# Patient Record
Sex: FEMALE | Race: WHITE | Employment: OTHER | URBAN - METROPOLITAN AREA
[De-identification: names, ages, dates, MRNs, and addresses within clinical notes are randomized per-mention and may not be internally consistent; named-entity substitution may affect disease eponyms.]

---

## 2017-01-11 DIAGNOSIS — M35.3 POLYMYALGIA RHEUMATICA (H): Primary | ICD-10-CM

## 2017-01-12 NOTE — TELEPHONE ENCOUNTER
Norco 5-325mg      Last Written Prescription Date: 12/21/2016  Last Fill Quantity: 60,  # refills: 0   Last Office Visit with [facility], P or [facility] prescribing provider: 10/31/2016    Michelle Espitia, Pharmacy Technician  [facility]  [phone]

## 2017-01-13 RX ORDER — HYDROCODONE BITARTRATE AND ACETAMINOPHEN 5; 325 MG/1; MG/1
TABLET ORAL
Qty: 60 TABLET | Refills: 0 | Status: SHIPPED | OUTPATIENT
Start: 2017-01-13 | End: 2017-02-08

## 2017-01-18 DIAGNOSIS — I10 ESSENTIAL HYPERTENSION WITH GOAL BLOOD PRESSURE LESS THAN 140/90: ICD-10-CM

## 2017-01-18 DIAGNOSIS — N18.30 CKD (CHRONIC KIDNEY DISEASE) STAGE 3, GFR 30-59 ML/MIN (H): ICD-10-CM

## 2017-01-18 LAB
ALT SERPL W P-5'-P-CCNC: 21 U/L (ref 0–50)
AST SERPL W P-5'-P-CCNC: 19 U/L (ref 0–45)

## 2017-01-18 PROCEDURE — 84460 ALANINE AMINO (ALT) (SGPT): CPT | Performed by: FAMILY MEDICINE

## 2017-01-18 PROCEDURE — 36415 COLL VENOUS BLD VENIPUNCTURE: CPT | Performed by: FAMILY MEDICINE

## 2017-01-18 PROCEDURE — 84450 TRANSFERASE (AST) (SGOT): CPT | Performed by: FAMILY MEDICINE

## 2017-02-08 DIAGNOSIS — M35.3 POLYMYALGIA RHEUMATICA (H): Primary | ICD-10-CM

## 2017-02-08 NOTE — TELEPHONE ENCOUNTER
Hydrocodone/APAP 5/325mg      Last Written Prescription Date: 1/13/2017  Last Fill Quantity: 60,  # refills: 0   Last Office Visit with [facility], P or [facility] prescribing provider: 10/31/2016                                         Next 5 appointments (look out 90 days)     Mar 01, 2017  2:15 PM   Office Visit with Tai Kim MD   [facility] ([facility])    [address] [place] [id number] [phone]                      Please bring signed prescription to [facility] if approved.    Thank you,  Jessica Lowery, [facility]

## 2017-02-09 RX ORDER — HYDROCODONE BITARTRATE AND ACETAMINOPHEN 5; 325 MG/1; MG/1
TABLET ORAL
Qty: 60 TABLET | Refills: 0 | Status: SHIPPED | OUTPATIENT
Start: 2017-02-09 | End: 2017-03-08

## 2017-02-11 ENCOUNTER — APPOINTMENT (OUTPATIENT)
Dept: GENERAL RADIOLOGY | Facility: CLINIC | Age: 82
DRG: 391 | End: 2017-02-11
Attending: FAMILY MEDICINE
Payer: MEDICARE

## 2017-02-11 ENCOUNTER — APPOINTMENT (OUTPATIENT)
Dept: CT IMAGING | Facility: CLINIC | Age: 82
DRG: 391 | End: 2017-02-11
Attending: FAMILY MEDICINE
Payer: MEDICARE

## 2017-02-11 ENCOUNTER — HOSPITAL ENCOUNTER (INPATIENT)
Facility: CLINIC | Age: 82
LOS: 2 days | Discharge: HOME | DRG: 391 | End: 2017-02-13
Attending: FAMILY MEDICINE | Admitting: INTERNAL MEDICINE
Payer: MEDICARE

## 2017-02-11 DIAGNOSIS — I95.9 HYPOTENSION DETERMINED BY EXAMINATION: ICD-10-CM

## 2017-02-11 DIAGNOSIS — A41.9 SEVERE SEPSIS WITH SEPTIC SHOCK (H): ICD-10-CM

## 2017-02-11 DIAGNOSIS — E83.39 HYPOPHOSPHATEMIA: Primary | ICD-10-CM

## 2017-02-11 DIAGNOSIS — R68.83 CHILLS: ICD-10-CM

## 2017-02-11 DIAGNOSIS — F03.90 DEMENTIA WITHOUT BEHAVIORAL DISTURBANCE, UNSPECIFIED DEMENTIA TYPE: ICD-10-CM

## 2017-02-11 DIAGNOSIS — R65.21 SEVERE SEPSIS WITH SEPTIC SHOCK (H): ICD-10-CM

## 2017-02-11 DIAGNOSIS — I10 HYPERTENSION GOAL BP (BLOOD PRESSURE) < 140/90: ICD-10-CM

## 2017-02-11 DIAGNOSIS — Z91.81 RISK FOR FALLS: ICD-10-CM

## 2017-02-11 DIAGNOSIS — E86.0 DEHYDRATION: ICD-10-CM

## 2017-02-11 DIAGNOSIS — N18.30 CKD (CHRONIC KIDNEY DISEASE) STAGE 3, GFR 30-59 ML/MIN (H): ICD-10-CM

## 2017-02-11 DIAGNOSIS — Z71.89 ADVANCED DIRECTIVES, COUNSELING/DISCUSSION: Chronic | ICD-10-CM

## 2017-02-11 DIAGNOSIS — R19.7 DIARRHEA OF PRESUMED INFECTIOUS ORIGIN: ICD-10-CM

## 2017-02-11 DIAGNOSIS — D72.823 LEUKEMOID REACTION: ICD-10-CM

## 2017-02-11 DIAGNOSIS — R82.81 PYURIA: ICD-10-CM

## 2017-02-11 DIAGNOSIS — R19.7 DIARRHEA: ICD-10-CM

## 2017-02-11 DIAGNOSIS — E87.20 LACTIC ACID ACIDOSIS: ICD-10-CM

## 2017-02-11 PROBLEM — G93.40 ACUTE ENCEPHALOPATHY: Status: ACTIVE | Noted: 2017-02-11

## 2017-02-11 PROBLEM — A08.11 ENTERITIS DUE TO NOROVIRUS: Status: ACTIVE | Noted: 2017-02-11

## 2017-02-11 PROBLEM — M62.81 GENERALIZED MUSCLE WEAKNESS: Status: ACTIVE | Noted: 2017-02-11

## 2017-02-11 LAB
ALBUMIN SERPL-MCNC: 3.5 G/DL (ref 3.4–5)
ALBUMIN UR-MCNC: 100 MG/DL
ALP SERPL-CCNC: 56 U/L (ref 40–150)
ALT SERPL W P-5'-P-CCNC: 20 U/L (ref 0–50)
ANION GAP SERPL CALCULATED.3IONS-SCNC: 8 MMOL/L (ref 3–14)
APPEARANCE UR: CLEAR
AST SERPL W P-5'-P-CCNC: 20 U/L (ref 0–45)
BASE DEFICIT BLDV-SCNC: 1.9 MMOL/L
BASOPHILS # BLD AUTO: 0 10E9/L (ref 0–0.2)
BASOPHILS NFR BLD AUTO: 0.1 %
BILIRUB SERPL-MCNC: 0.2 MG/DL (ref 0.2–1.3)
BILIRUB UR QL STRIP: NEGATIVE
BUN SERPL-MCNC: 22 MG/DL (ref 7–30)
C DIFF TOX B STL QL: NORMAL
CALCIUM SERPL-MCNC: 8.7 MG/DL (ref 8.5–10.1)
CAMPYLOBACTER GROUP BY NAT: NOT DETECTED
CHLORIDE SERPL-SCNC: 108 MMOL/L (ref 94–109)
CO2 SERPL-SCNC: 27 MMOL/L (ref 20–32)
COLOR UR AUTO: YELLOW
CREAT SERPL-MCNC: 0.96 MG/DL (ref 0.52–1.04)
DIFFERENTIAL METHOD BLD: ABNORMAL
ENTERIC PATHOGEN COMMENT: ABNORMAL
EOSINOPHIL # BLD AUTO: 0.5 10E9/L (ref 0–0.7)
EOSINOPHIL NFR BLD AUTO: 2.4 %
ERYTHROCYTE [DISTWIDTH] IN BLOOD BY AUTOMATED COUNT: 13 % (ref 10–15)
GFR SERPL CREATININE-BSD FRML MDRD: 55 ML/MIN/1.7M2
GLUCOSE SERPL-MCNC: 125 MG/DL (ref 70–99)
GLUCOSE UR STRIP-MCNC: NEGATIVE MG/DL
GRAN CASTS #/AREA URNS LPF: ABNORMAL /LPF
HCO3 BLDV-SCNC: 25 MMOL/L (ref 21–28)
HCT VFR BLD AUTO: 39 % (ref 35–47)
HGB BLD-MCNC: 12.4 G/DL (ref 11.7–15.7)
HGB UR QL STRIP: ABNORMAL
IMM GRANULOCYTES # BLD: 0.1 10E9/L (ref 0–0.4)
IMM GRANULOCYTES NFR BLD: 0.3 %
KETONES UR STRIP-MCNC: NEGATIVE MG/DL
LACTATE BLD-SCNC: 2 MMOL/L (ref 0.7–2.1)
LACTATE BLD-SCNC: 2.3 MMOL/L (ref 0.7–2.1)
LACTATE BLD-SCNC: 2.3 MMOL/L (ref 0.7–2.1)
LACTATE BLD-SCNC: 2.6 MMOL/L (ref 0.7–2.1)
LEUKOCYTE ESTERASE UR QL STRIP: NEGATIVE
LYMPHOCYTES # BLD AUTO: 3 10E9/L (ref 0.8–5.3)
LYMPHOCYTES NFR BLD AUTO: 15.5 %
MAGNESIUM SERPL-MCNC: 1.9 MG/DL (ref 1.6–2.3)
MCH RBC QN AUTO: 29.5 PG (ref 26.5–33)
MCHC RBC AUTO-ENTMCNC: 31.8 G/DL (ref 31.5–36.5)
MCV RBC AUTO: 93 FL (ref 78–100)
MONOCYTES # BLD AUTO: 1.3 10E9/L (ref 0–1.3)
MONOCYTES NFR BLD AUTO: 6.7 %
MUCOUS THREADS #/AREA URNS LPF: PRESENT /LPF
NEUTROPHILS # BLD AUTO: 14.4 10E9/L (ref 1.6–8.3)
NEUTROPHILS NFR BLD AUTO: 75 %
NITRATE UR QL: NEGATIVE
NOROVIRUS I AND II BY NAT: ABNORMAL
O2/TOTAL GAS SETTING VFR VENT: ABNORMAL %
PCO2 BLDV: 53 MM HG (ref 40–50)
PH BLDV: 7.28 PH (ref 7.32–7.43)
PH UR STRIP: 7 PH (ref 5–7)
PHOSPHATE SERPL-MCNC: 2.8 MG/DL (ref 2.5–4.5)
PLATELET # BLD AUTO: 284 10E9/L (ref 150–450)
PO2 BLDV: 19 MM HG (ref 25–47)
POTASSIUM SERPL-SCNC: 4 MMOL/L (ref 3.4–5.3)
PROT SERPL-MCNC: 7 G/DL (ref 6.8–8.8)
RBC # BLD AUTO: 4.21 10E12/L (ref 3.8–5.2)
RBC #/AREA URNS AUTO: ABNORMAL /HPF (ref 0–2)
RENAL EPI CELLS #/AREA URNS HPF: ABNORMAL /HPF
ROTAVIRUS A BY NAT: NOT DETECTED
SALMONELLA SPECIES BY NAT: NOT DETECTED
SHIGA TOXIN 1 GENE BY NAT: NOT DETECTED
SHIGA TOXIN 2 GENE BY NAT: NOT DETECTED
SHIGELLA SP+EIEC IPAH STL QL NAA+PROBE: NOT DETECTED
SODIUM SERPL-SCNC: 143 MMOL/L (ref 133–144)
SP GR UR STRIP: 1.02 (ref 1–1.03)
SPECIMEN SOURCE: NORMAL
URN SPEC COLLECT METH UR: ABNORMAL
UROBILINOGEN UR STRIP-ACNC: 0.2 EU/DL (ref 0.2–1)
VIBRIO GROUP BY NAT: NOT DETECTED
WBC # BLD AUTO: 19.3 10E9/L (ref 4–11)
WBC #/AREA URNS AUTO: ABNORMAL /HPF (ref 0–2)
YERSINIA ENTEROCOLITICA BY NAT: NOT DETECTED

## 2017-02-11 PROCEDURE — A9270 NON-COVERED ITEM OR SERVICE: HCPCS | Mod: GY | Performed by: INTERNAL MEDICINE

## 2017-02-11 PROCEDURE — 85025 COMPLETE CBC W/AUTO DIFF WBC: CPT | Performed by: FAMILY MEDICINE

## 2017-02-11 PROCEDURE — 99285 EMERGENCY DEPT VISIT HI MDM: CPT | Mod: 25

## 2017-02-11 PROCEDURE — 81001 URINALYSIS AUTO W/SCOPE: CPT | Performed by: FAMILY MEDICINE

## 2017-02-11 PROCEDURE — 36415 COLL VENOUS BLD VENIPUNCTURE: CPT | Performed by: INTERNAL MEDICINE

## 2017-02-11 PROCEDURE — 82803 BLOOD GASES ANY COMBINATION: CPT | Performed by: FAMILY MEDICINE

## 2017-02-11 PROCEDURE — 93005 ELECTROCARDIOGRAM TRACING: CPT

## 2017-02-11 PROCEDURE — 87493 C DIFF AMPLIFIED PROBE: CPT | Performed by: FAMILY MEDICINE

## 2017-02-11 PROCEDURE — 83605 ASSAY OF LACTIC ACID: CPT | Performed by: FAMILY MEDICINE

## 2017-02-11 PROCEDURE — 99285 EMERGENCY DEPT VISIT HI MDM: CPT | Mod: 25 | Performed by: FAMILY MEDICINE

## 2017-02-11 PROCEDURE — 96361 HYDRATE IV INFUSION ADD-ON: CPT

## 2017-02-11 PROCEDURE — 25000125 ZZHC RX 250: Performed by: FAMILY MEDICINE

## 2017-02-11 PROCEDURE — 99223 1ST HOSP IP/OBS HIGH 75: CPT | Mod: AI | Performed by: INTERNAL MEDICINE

## 2017-02-11 PROCEDURE — 83605 ASSAY OF LACTIC ACID: CPT | Performed by: INTERNAL MEDICINE

## 2017-02-11 PROCEDURE — 87506 IADNA-DNA/RNA PROBE TQ 6-11: CPT | Performed by: FAMILY MEDICINE

## 2017-02-11 PROCEDURE — 25800025 ZZH RX 258: Performed by: PEDIATRICS

## 2017-02-11 PROCEDURE — 51702 INSERT TEMP BLADDER CATH: CPT

## 2017-02-11 PROCEDURE — 74176 CT ABD & PELVIS W/O CONTRAST: CPT

## 2017-02-11 PROCEDURE — 25000128 H RX IP 250 OP 636: Performed by: INTERNAL MEDICINE

## 2017-02-11 PROCEDURE — 25000128 H RX IP 250 OP 636: Performed by: FAMILY MEDICINE

## 2017-02-11 PROCEDURE — 36415 COLL VENOUS BLD VENIPUNCTURE: CPT | Performed by: PEDIATRICS

## 2017-02-11 PROCEDURE — 25000132 ZZH RX MED GY IP 250 OP 250 PS 637: Mod: GY | Performed by: INTERNAL MEDICINE

## 2017-02-11 PROCEDURE — 84100 ASSAY OF PHOSPHORUS: CPT | Performed by: INTERNAL MEDICINE

## 2017-02-11 PROCEDURE — A9270 NON-COVERED ITEM OR SERVICE: HCPCS | Mod: GY | Performed by: PEDIATRICS

## 2017-02-11 PROCEDURE — 93010 ELECTROCARDIOGRAM REPORT: CPT | Performed by: FAMILY MEDICINE

## 2017-02-11 PROCEDURE — 96365 THER/PROPH/DIAG IV INF INIT: CPT

## 2017-02-11 PROCEDURE — 96367 TX/PROPH/DG ADDL SEQ IV INF: CPT

## 2017-02-11 PROCEDURE — 80053 COMPREHEN METABOLIC PANEL: CPT | Performed by: FAMILY MEDICINE

## 2017-02-11 PROCEDURE — 87081 CULTURE SCREEN ONLY: CPT | Performed by: PEDIATRICS

## 2017-02-11 PROCEDURE — 25000132 ZZH RX MED GY IP 250 OP 250 PS 637: Mod: GY | Performed by: PEDIATRICS

## 2017-02-11 PROCEDURE — 87040 BLOOD CULTURE FOR BACTERIA: CPT | Performed by: FAMILY MEDICINE

## 2017-02-11 PROCEDURE — 12000007 ZZH R&B INTERMEDIATE

## 2017-02-11 PROCEDURE — 83605 ASSAY OF LACTIC ACID: CPT | Performed by: PEDIATRICS

## 2017-02-11 PROCEDURE — 71010 XR CHEST PORT 1 VW: CPT | Mod: TC

## 2017-02-11 PROCEDURE — 87086 URINE CULTURE/COLONY COUNT: CPT | Performed by: FAMILY MEDICINE

## 2017-02-11 PROCEDURE — 83735 ASSAY OF MAGNESIUM: CPT | Performed by: INTERNAL MEDICINE

## 2017-02-11 RX ORDER — ONDANSETRON 4 MG/1
4 TABLET, ORALLY DISINTEGRATING ORAL EVERY 6 HOURS PRN
Status: DISCONTINUED | OUTPATIENT
Start: 2017-02-11 | End: 2017-02-13 | Stop reason: HOSPADM

## 2017-02-11 RX ORDER — ACETAMINOPHEN 325 MG/1
650 TABLET ORAL EVERY 4 HOURS PRN
Status: DISCONTINUED | OUTPATIENT
Start: 2017-02-11 | End: 2017-02-11

## 2017-02-11 RX ORDER — QUETIAPINE FUMARATE 25 MG/1
25 TABLET, FILM COATED ORAL AT BEDTIME
Status: DISCONTINUED | OUTPATIENT
Start: 2017-02-11 | End: 2017-02-13 | Stop reason: HOSPADM

## 2017-02-11 RX ORDER — ACETAMINOPHEN 325 MG/1
975 TABLET ORAL EVERY 6 HOURS PRN
Status: DISCONTINUED | OUTPATIENT
Start: 2017-02-11 | End: 2017-02-13 | Stop reason: HOSPADM

## 2017-02-11 RX ORDER — LEVOCETIRIZINE DIHYDROCHLORIDE 5 MG/1
5 TABLET, FILM COATED ORAL EVERY EVENING
Status: DISCONTINUED | OUTPATIENT
Start: 2017-02-11 | End: 2017-02-11 | Stop reason: CLARIF

## 2017-02-11 RX ORDER — ONDANSETRON 2 MG/ML
4 INJECTION INTRAMUSCULAR; INTRAVENOUS EVERY 6 HOURS PRN
Status: DISCONTINUED | OUTPATIENT
Start: 2017-02-11 | End: 2017-02-13 | Stop reason: HOSPADM

## 2017-02-11 RX ORDER — TRAMADOL HYDROCHLORIDE 50 MG/1
50 TABLET ORAL EVERY 6 HOURS PRN
Status: DISCONTINUED | OUTPATIENT
Start: 2017-02-11 | End: 2017-02-13 | Stop reason: HOSPADM

## 2017-02-11 RX ORDER — METRONIDAZOLE 250 MG/1
250 TABLET ORAL EVERY 8 HOURS SCHEDULED
Status: DISCONTINUED | OUTPATIENT
Start: 2017-02-11 | End: 2017-02-13

## 2017-02-11 RX ORDER — LORATADINE 10 MG/1
10 TABLET ORAL EVERY EVENING
Status: DISCONTINUED | OUTPATIENT
Start: 2017-02-11 | End: 2017-02-13 | Stop reason: HOSPADM

## 2017-02-11 RX ORDER — ONDANSETRON 4 MG/1
4 TABLET, ORALLY DISINTEGRATING ORAL
Status: COMPLETED | OUTPATIENT
Start: 2017-02-11 | End: 2017-02-11

## 2017-02-11 RX ORDER — MAGNESIUM SULFATE HEPTAHYDRATE 40 MG/ML
4 INJECTION, SOLUTION INTRAVENOUS EVERY 4 HOURS PRN
Status: DISCONTINUED | OUTPATIENT
Start: 2017-02-11 | End: 2017-02-13 | Stop reason: HOSPADM

## 2017-02-11 RX ORDER — PROCHLORPERAZINE 25 MG
12.5 SUPPOSITORY, RECTAL RECTAL EVERY 12 HOURS PRN
Status: DISCONTINUED | OUTPATIENT
Start: 2017-02-11 | End: 2017-02-13 | Stop reason: HOSPADM

## 2017-02-11 RX ORDER — LIDOCAINE 40 MG/G
CREAM TOPICAL
Status: DISCONTINUED | OUTPATIENT
Start: 2017-02-11 | End: 2017-02-11

## 2017-02-11 RX ORDER — PROCHLORPERAZINE MALEATE 5 MG
5 TABLET ORAL EVERY 6 HOURS PRN
Status: DISCONTINUED | OUTPATIENT
Start: 2017-02-11 | End: 2017-02-13 | Stop reason: HOSPADM

## 2017-02-11 RX ORDER — SODIUM CHLORIDE 9 MG/ML
INJECTION, SOLUTION INTRAVENOUS CONTINUOUS
Status: DISCONTINUED | OUTPATIENT
Start: 2017-02-11 | End: 2017-02-11

## 2017-02-11 RX ORDER — DONEPEZIL HYDROCHLORIDE 5 MG/1
10 TABLET, FILM COATED ORAL AT BEDTIME
Status: DISCONTINUED | OUTPATIENT
Start: 2017-02-11 | End: 2017-02-13 | Stop reason: HOSPADM

## 2017-02-11 RX ORDER — SODIUM CHLORIDE 9 MG/ML
INJECTION, SOLUTION INTRAVENOUS CONTINUOUS
Status: DISCONTINUED | OUTPATIENT
Start: 2017-02-11 | End: 2017-02-12

## 2017-02-11 RX ORDER — ASPIRIN 81 MG/1
81 TABLET ORAL DAILY
Status: DISCONTINUED | OUTPATIENT
Start: 2017-02-12 | End: 2017-02-13 | Stop reason: HOSPADM

## 2017-02-11 RX ORDER — NALOXONE HYDROCHLORIDE 0.4 MG/ML
.1-.4 INJECTION, SOLUTION INTRAMUSCULAR; INTRAVENOUS; SUBCUTANEOUS
Status: DISCONTINUED | OUTPATIENT
Start: 2017-02-11 | End: 2017-02-12

## 2017-02-11 RX ORDER — HYDROCODONE BITARTRATE AND ACETAMINOPHEN 5; 325 MG/1; MG/1
1 TABLET ORAL EVERY 6 HOURS PRN
Status: DISCONTINUED | OUTPATIENT
Start: 2017-02-11 | End: 2017-02-11

## 2017-02-11 RX ADMIN — VANCOMYCIN HYDROCHLORIDE 1500 MG: 10 INJECTION, POWDER, LYOPHILIZED, FOR SOLUTION INTRAVENOUS at 04:45

## 2017-02-11 RX ADMIN — DICLOFENAC SODIUM 50 MG: 50 TABLET, DELAYED RELEASE ORAL at 20:44

## 2017-02-11 RX ADMIN — TAZOBACTAM SODIUM AND PIPERACILLIN SODIUM 4.5 G: 500; 4 INJECTION, SOLUTION INTRAVENOUS at 04:13

## 2017-02-11 RX ADMIN — LORATADINE 10 MG: 10 TABLET ORAL at 20:44

## 2017-02-11 RX ADMIN — METRONIDAZOLE 250 MG: 250 TABLET ORAL at 21:07

## 2017-02-11 RX ADMIN — ONDANSETRON 4 MG: 4 TABLET, ORALLY DISINTEGRATING ORAL at 03:21

## 2017-02-11 RX ADMIN — RANITIDINE HYDROCHLORIDE 300 MG: 150 TABLET, FILM COATED ORAL at 10:27

## 2017-02-11 RX ADMIN — TAZOBACTAM SODIUM AND PIPERACILLIN SODIUM 3.38 G: 375; 3 INJECTION, SOLUTION INTRAVENOUS at 21:07

## 2017-02-11 RX ADMIN — QUETIAPINE FUMARATE 25 MG: 25 TABLET, FILM COATED ORAL at 20:44

## 2017-02-11 RX ADMIN — METRONIDAZOLE 250 MG: 250 TABLET ORAL at 15:40

## 2017-02-11 RX ADMIN — SODIUM CHLORIDE, POTASSIUM CHLORIDE, SODIUM LACTATE AND CALCIUM CHLORIDE 1000 ML: 600; 310; 30; 20 INJECTION, SOLUTION INTRAVENOUS at 09:28

## 2017-02-11 RX ADMIN — SODIUM CHLORIDE: 9 INJECTION, SOLUTION INTRAVENOUS at 05:32

## 2017-02-11 RX ADMIN — SODIUM CHLORIDE: 9 INJECTION, SOLUTION INTRAVENOUS at 18:31

## 2017-02-11 RX ADMIN — SODIUM CHLORIDE 1000 ML: 9 INJECTION, SOLUTION INTRAVENOUS at 02:38

## 2017-02-11 RX ADMIN — SODIUM CHLORIDE: 9 INJECTION, SOLUTION INTRAVENOUS at 10:59

## 2017-02-11 RX ADMIN — DONEPEZIL HYDROCHLORIDE 10 MG: 5 TABLET, FILM COATED ORAL at 20:44

## 2017-02-11 RX ADMIN — LEVOTHYROXINE SODIUM 125 MCG: 25 TABLET ORAL at 10:26

## 2017-02-11 RX ADMIN — HYDROCODONE BITARTRATE AND ACETAMINOPHEN 1 TABLET: 5; 325 TABLET ORAL at 10:59

## 2017-02-11 RX ADMIN — TAZOBACTAM SODIUM AND PIPERACILLIN SODIUM 3.38 G: 375; 3 INJECTION, SOLUTION INTRAVENOUS at 15:39

## 2017-02-11 RX ADMIN — SODIUM CHLORIDE: 9 INJECTION, SOLUTION INTRAVENOUS at 03:48

## 2017-02-11 RX ADMIN — TRAMADOL HYDROCHLORIDE 50 MG: 50 TABLET, COATED ORAL at 23:29

## 2017-02-11 ASSESSMENT — ENCOUNTER SYMPTOMS
CHILLS: 1
WEAKNESS: 1
CHEST TIGHTNESS: 0
CONFUSION: 1
ABDOMINAL PAIN: 1
NAUSEA: 1
DIARRHEA: 1
FREQUENCY: 1
MUSCULOSKELETAL NEGATIVE: 1
SHORTNESS OF BREATH: 0

## 2017-02-11 NOTE — ED NOTES
Pt lives at an assisted living center, EMS reports they were told pt has been having increased urination and diarrhea for the past couple hours with c/o chills and increased weakness and decreased LOC.

## 2017-02-11 NOTE — PROGRESS NOTES
[facility]    Sepsis Evaluation Progress Note    Date of Service: 02/11/2017    I was called to see Anaid Dockery due to persistent elevated lactic acid level. She is not known to have an infection.     Physical Exam    Vital Signs:  Temp: 97  F (36.1  C) Temp src: Oral BP: (!) 135/97 mmHg   Heart Rate: 76 Resp: 20 SpO2: 96 % O2 Device: None (Room air)      Lab:  LACTIC ACID   Date Value Ref Range Status   02/11/2017 2.6* 0.7 - 2.1 mmol/L Final     Comment:     Significant value called to and read back by  TEE MANN RN AT 0900 TA         The patient is at baseline mental status.    The rest of their physical exam is significant for pallor with capillary refill 2-3 seconds but normal blood pressure and peripheral pulses.    Assessment and Plan    The SIRS and exam findings are likely due to severe hypovolemia from diarrhea and inadequate oral intake although sepsis cannot be compelted excluded at this time, there is no sign of sepsis at this time.     Give 1L LR bolus now, continue IVF at 150 ml/hr, and recheck lactic acid in 3 hours.  Continue present empiric antibiotics pending culture results.    Disposition: The patient will remain on the current unit. We will continue to monitor this patient closely.    Vinicius Naqvi MD

## 2017-02-11 NOTE — PROGRESS NOTES
S-(situation): Patient arrives to room 249 via cart from ED    B-(background): Sepsis/Diarrhea    A-(assessment): Pt has some confusion.  Having multiple episodes of diarrhea while in ED.  Change in her mental status.  B/P slightly elevated upon admit. Afebrile.  Lethargy noted.  LS-diminished but clear.  No skin issues, will start to put ointment on after each stool to prevent skin breakdown.      R-(recommendations): Orders reviewed with pt and family. Will monitor patient per MD orders.     Inpatient nursing criteria listed below were met:    Health care directives status obtained and documented: Yes  Core Measures assessed (SSI): Yes  SCD's Documented: Yes  Vaccine assessment done and vaccines ordered if appropriate: Yes  Skin issues/needs documented:NA  Isolation needs addressed, if appropriate: Yes  Fall Prevention: Care plan updated, Education given and documented Yes  MRSA swab completed for patient 55 years and older (exclude JUAN CARLOS and TKA): Yes  My Chart patient sign up addressed and documented: No  Care Plan initiated: Yes  Education Assessment documented:Yes  Education Documented (Pre-existing chronic infection such as, MRSA/VRE need education on admission): Yes  New medication patient education completed and documented (Possible Side Effects of Common Medications handout): No  Home medications if not able to send immediately home with family stored here: NA   Reminder note placed in discharge instructions: Yes  Discharge planning review completed (admission navigator) Yes

## 2017-02-11 NOTE — ED NOTES
Pt has been having loose stools, continue to wash jose area. Noted areas on the back of both legs that measure 0.5cm scratches.

## 2017-02-11 NOTE — IP AVS SNAPSHOT
MRN:[id number]                      After Visit Summary   2/11/2017    Anaid Dockery    MRN: [id number]           Thank you!     Thank you for choosing [facility] for your care. Our goal is always to provide you with excellent care. Hearing back from our patients is one way we can continue to improve our services. Please take a few minutes to complete the written survey that you may receive in the mail after you visit with us. Thank you!        Patient Information     Date Of Birth          2/1/1930        About your hospital stay     You were admitted on:  February 11, 2017 You last received care in the:  [address] [facility]    You were discharged on:  February 13, 2017       Who to Call     For medical emergencies, please call 911.  For non-urgent questions about your medical care, please call your primary care provider or clinic, [phone]          Attending Provider     Provider Specialty    Jb Locke DO [facility]    Vinicius Naqvi MD Internal Medicine    Escobar Reece MD [facility]       Primary Care Provider Office Phone # Fax #    Tai Kim [phone]       [address] [address]        After Care Instructions     Activity       Your activity upon discharge: activity as tolerated            Diet       Follow this diet upon discharge: Advance to a regular diet as tolerated                  Follow-up Appointments     Follow-up and recommended labs and tests        Follow up with primary care provider, Tai Kim, within 7 days for hospital follow- up.  The following labs/tests are recommended: bmp.                  Your next 10 appointments already scheduled     Mar 01, 2017  2:15 PM CST   [facility]    [address] [place] [id number] [phone]            Bring a current list of meds and any records pertaining to this visit.  For Physicals, please bring immunization records and any forms needing to be filled out.  Please arrive 10 minutes early to complete paperwork.              Pending Results     Date and Time Order Name Status Description    2/11/2017 0218 Blood culture Preliminary     2/11/2017 0218 Blood culture Preliminary             Statement of Approval     Ordered          02/13/17 1525  I have reviewed and agree with all the recommendations and orders detailed in this document.  EFFECTIVE NOW     Approved and electronically signed by:  Escobar Reece MD             Admission Information     Date & Time Provider Department Dept. Phone    2/11/2017 Escobar Reece MD [address] [facility] [phone]      Your Vitals Were     Blood Pressure Pulse Temperature Respirations Weight Pulse Oximetry    168/82 64 98.5  F (36.9  C) (Oral) 18 83.5 kg (184 lb 1.4 oz) 93%    BMI (Body Mass Index)                   30.63 kg/m2           [employer] [facility]     [employer] gives you secure access to your electronic health record. If you see a primary care provider, you can also send messages to your care team and make appointments. If you have questions, please call your primary care clinic.  If you do not have a primary care provider, please call [phone] and they will assist you.        Care EveryWhere ID     This is your Care EveryWhere ID. This could be used by other organizations to access your [facility] medical records  [id number]           Review of your medicines      START taking        Dose / Directions    phosphorus tablet 250 mg 250 MG per tablet   Commonly known as:  K PHOS NEUTRAL   Used for:  Hypophosphatemia        Dose:  250 mg   Take 1 tablet (250 mg) by mouth 2 times daily for 2 days   Quantity:  4 tablet   Refills:  0         CONTINUE these medicines which have NOT CHANGED        Dose / Directions    ACTAMIN  MAXIMUM STRENGTH OR        Dose:  500 mg   Take 500 mg by mouth every 6 hours. Every 4-6 hours for pain   Refills:  0       aspirin 81 MG tablet   Used for:  Essential hypertension with goal blood pressure less than 140/90        ONE DAILY   Quantity:  100 tablet   Refills:  3       CALCIUM 500 + D 500-125 MG-UNIT Tabs   Used for:  Osteoporosis, unspecified   Generic drug:  Calcium Carbonate-Vitamin D        1 A DAY   Refills:  0       diclofenac 50 MG EC tablet   Commonly known as:  VOLTAREN   Used for:  Essential hypertension with goal blood pressure less than 140/90        TAKE ONE TABLET BY MOUTH TWICE A DAY   Quantity:  180 tablet   Refills:  0       donepezil 10 MG tablet   Commonly known as:  ARICEPT   Used for:  Dementia        TAKE ONE TABLET BY MOUTH EVERY NIGHT AT BEDTIME   Quantity:  90 tablet   Refills:  2       Fish Oil 1200 MG Caps        every day   Refills:  0       FLUoxetine 20 MG capsule   Commonly known as:  PROzac   Used for:  Major depressive disorder, recurrent episode, mild (H)        TAKE ONE CAPSULE BY MOUTH EVERY DAY   Quantity:  90 capsule   Refills:  3       HYDROcodone-acetaminophen 5-325 MG per tablet   Commonly known as:  NORCO   Used for:  Polymyalgia rheumatica (H)        TAKE 1 TABLET BY ORAL ROUTE EVERY 6 HOURS AS NEEDED FOR PAIN   Quantity:  60 tablet   Refills:  0       hydrocortisone-pramoxine rectal foam   Commonly known as:  PROCTOFOAM HC   Used for:  External hemorrhoids        Dose:  1 applicator   Place 1 applicator rectally 2 times daily   Quantity:  10 g   Refills:  2       levocetirizine 5 MG tablet   Commonly known as:  XYZAL   Used for:  Seasonal allergic rhinitis        TAKE ONE TABLET BY MOUTH EVERY EVENING   Quantity:  30 tablet   Refills:  11       levothyroxine 125 MCG tablet   Commonly known as:  SYNTHROID/LEVOTHROID   Used for:  Hypothyroidism, unspecified type        TAKE ONE TABLET BY MOUTH EVERY DAY   Quantity:  90 tablet   Refills:  3       lidocaine 5 %  ointment   Commonly known as:  XYLOCAINE   Used for:  Pain of left upper extremity        Apply topically 2 times daily To affected areas.   Quantity:  50 g   Refills:  1       loperamide 2 MG tablet   Commonly known as:  IMODIUM A-D   Used for:  Diarrhea        Start with 2 tabs (4 mg), then take one tab (2 mg) after each diarrheal stool.  Do not use more than  8 tabs (16 mg) per day.   Quantity:  30 tablet   Refills:  0       metoprolol 25 MG tablet   Commonly known as:  LOPRESSOR   Used for:  Hypertension goal BP (blood pressure) < 140/90        TAKE ONE TABLET BY MOUTH EVERY MORNING AND TAKE TWO TABLETS BY MOUTH EVERY EVENING   Quantity:  270 tablet   Refills:  2       Multi-vitamin Tabs tablet   Generic drug:  multivitamin, therapeutic with minerals        1 tablet daily   Quantity:  120   Refills:  0       order for DME   Used for:  Other osteoarthritis of spine, lumbar region        Equipment being ordered: Wheelchair basic with foot rests.   Quantity:  1 Device   Refills:  0       QUEtiapine 25 MG tablet   Commonly known as:  SEROQUEL        take 1 and 1/2 tab daily if needed for restlessness/agitation and 1 at bedtime routinely   Quantity:  225 tablet   Refills:  1       ranitidine 300 MG tablet   Commonly known as:  ZANTAC   Used for:  Gastroesophageal reflux disease with esophagitis        TAKE ONE TABLET BY MOUTH EVERY DAY   Quantity:  90 tablet   Refills:  3         STOP taking     MIRALAX powder   Generic drug:  polyethylene glycol           sennosides 8.6 MG tablet   Commonly known as:  SENOKOT                Where to get your medicines      These medications were sent to [facility] [place], [place] - [address]  [address], [facility] [address]     Phone:  [phone]     phosphorus tablet 250 mg 250 MG per tablet                Protect others around you: Learn how to safely use, store and throw away your medicines at www.disposemymeds.org.             Medication List: This  is a list of all your medications and when to take them. Check marks below indicate your daily home schedule. Keep this list as a reference.      Medications           Morning Afternoon Evening Bedtime As Needed    ACTAMIN MAXIMUM STRENGTH OR   Take 500 mg by mouth every 6 hours. Every 4-6 hours for pain                                   aspirin 81 MG tablet   ONE DAILY                                   CALCIUM 500 + D 500-125 MG-UNIT Tabs   1 A DAY   Generic drug:  Calcium Carbonate-Vitamin D                                   diclofenac 50 MG EC tablet   Commonly known as:  VOLTAREN   TAKE ONE TABLET BY MOUTH TWICE A DAY   Last time this was given:  50 mg on 2/13/2017  8:14 AM                                      donepezil 10 MG tablet   Commonly known as:  ARICEPT   TAKE ONE TABLET BY MOUTH EVERY NIGHT AT BEDTIME   Last time this was given:  10 mg on 2/12/2017  9:23 PM                                   Fish Oil 1200 MG Caps   every day                                FLUoxetine 20 MG capsule   Commonly known as:  PROzac   TAKE ONE CAPSULE BY MOUTH EVERY DAY   Last time this was given:  20 mg on 2/13/2017  8:12 AM                                   HYDROcodone-acetaminophen 5-325 MG per tablet   Commonly known as:  NORCO   TAKE 1 TABLET BY ORAL ROUTE EVERY 6 HOURS AS NEEDED FOR PAIN   Last time this was given:  1 tablet on 2/11/2017 10:59 AM                                   hydrocortisone-pramoxine rectal foam   Commonly known as:  PROCTOFOAM HC   Place 1 applicator rectally 2 times daily                                      levocetirizine 5 MG tablet   Commonly known as:  XYZAL   TAKE ONE TABLET BY MOUTH EVERY EVENING                                   levothyroxine 125 MCG tablet   Commonly known as:  SYNTHROID/LEVOTHROID   TAKE ONE TABLET BY MOUTH EVERY DAY   Last time this was given:  125 mcg on 2/13/2017  8:12 AM                                   lidocaine 5 % ointment   Commonly known as:  XYLOCAINE   Apply  topically 2 times daily To affected areas.                                loperamide 2 MG tablet   Commonly known as:  IMODIUM A-D   Start with 2 tabs (4 mg), then take one tab (2 mg) after each diarrheal stool.  Do not use more than  8 tabs (16 mg) per day.                                metoprolol 25 MG tablet   Commonly known as:  LOPRESSOR   TAKE ONE TABLET BY MOUTH EVERY MORNING AND TAKE TWO TABLETS BY MOUTH EVERY EVENING   Last time this was given:  50 mg on 2/13/2017  4:14 AM                                      Multi-vitamin Tabs tablet   1 tablet daily   Generic drug:  multivitamin, therapeutic with minerals                                   order for DME   Equipment being ordered: Wheelchair basic with foot rests.                                phosphorus tablet 250 mg 250 MG per tablet   Commonly known as:  K PHOS NEUTRAL   Take 1 tablet (250 mg) by mouth 2 times daily for 2 days                                      QUEtiapine 25 MG tablet   Commonly known as:  SEROQUEL   take 1 and 1/2 tab daily if needed for restlessness/agitation and 1 at bedtime routinely   Last time this was given:  25 mg on 2/12/2017  9:23 PM                                   ranitidine 300 MG tablet   Commonly known as:  ZANTAC   TAKE ONE TABLET BY MOUTH EVERY DAY   Last time this was given:  300 mg on 2/13/2017  8:12 AM
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
## 2017-02-11 NOTE — H&P
[facility]    History and Physical  Hospitalist       Date of Admission:  2/11/2017  Date of Service (when I saw the patient): 02/11/2017    Assessment and Plan      Active Problems:    Sepsis (H)    Assessment: manifested by acute encephalopathy, leucocytosis, hypotension and elevated lactic acid.  She has a few WBC in her urine but her diarrhea is felt to be the most likely source.  Her CXR looks clear.  There has been some improvement in the ED with stabilization of her blood pressure and she is now able to answer a few questions which she was not able to do initially.      Plan: admit to inpatient, send stool for ALYSSA and c diff toxin, start empiric antibiotics with zosyn, vanco and will add oral flagyl, aggressive IV fluids, repeat lactic acid, monitor temps and WBC, BC and UC already sent      Diarrhea    Assessment: etiology unclear but suspected to be infectious.  She is having large volume of watery diarrhea.  She has not tenderness on exam so doubt ischemia.  No recent antibiotics.  No ill contacts with similar symptoms. No recent travel.     Plan: will send stool for ALYSSA as well as c diff toxin      Pyuria    Assessment: asymptomatic    Plan: UC and cover with antibiotics as outlined above for now      Generalized muscle weakness    Assessment: likely due to her acute illness.  No focal weakness and now following commands appropriately but remains somnolent.    Plan: monitor during her hospital stay and PT eval prior to discharge if she remains weak closer to her discharge      Acute encephalopathy    Assessment: chronic dementia but usually awake.  Was initially minimally responsive but now opening her eyes and talking briefly before she closes her eyes again. She is answering questions appropriately    Plan: monitor      CKD (chronic kidney disease) stage 3, GFR 30-59 ml/min    Assessment: chronic and stable thus far    Plan: follow      Dementia without behavioral  disturbance, unspecified dementia type    Assessment: chronic    Plan: no acute intervention but will continue seroquel at bedtime      Hypothyroidism, unspecified type    Assessment: recent free T4 normal    Plan: continue replacement      Essential hypertension with goal blood pressure less than 140/90    Assessment: BP was below 80 when first checked by paramedics but now has improved with fluids    Plan: hold BP meds for now      Chronic pain syndrome    Assessment: appears stable    Plan: continue vicodin prn      DVT Prophylaxis: Pneumatic Compression Devices  Code Status: DNR/DNI    Disposition: Expected discharge in 2-3 days once diarrhea has slowed down, tolerating advancing diet, signs of sepsis have resolved and cultures negative.    Escobar Mckeon MD    Primary Care Physician  Tai Kim    Chief Complaint  Somnolent    History is obtained from the patient, emergency department physician and patient's family    History of Present Illness  Anaid Dockery is a 87 year old female who presents with somnolence.  She has been having diarrhea for the last couple of days.  She has not had abdominal pain. She denies any urinary symptoms.  She had one episode of vomiting on her way to the ED in the ambulance but otherwise has not had any vomiting.  There has been no blood noted in the stool.  She admits to both chills and sweats.  Her diarrhea has slowly worsened over the last couple of days.  Tonight she was not responding to caregivers and was incontinent of stool so EMS was called.      Past Medical History   I have reviewed this patient's medical history and updated it with pertinent information if needed.   Past Medical History   Diagnosis Date     Thyrotoxicosis without mention of goiter or other cause, without mention of thyrotoxic crisis or storm      hyperthyroidism     Myalgia and myositis, unspecified      fibromyalgia; Myalgias/arthralgias.     Osteoporosis, unspecified      Other  kyphoscoliosis and scoliosis      NONSPECIFIC MEDICAL HISTORY      Iliotibial band syndrome.     Enthesopathy of hip region      Bilateral trochanteric bursitis     NONSPECIFIC MEDICAL HISTORY      Chronic pain syndrome     Lump or mass in breast 4/12/2006     Sicca syndrome (H)        Past Surgical History  I have reviewed this patient's surgical history and updated it with pertinent information if needed.  Past Surgical History   Procedure Laterality Date     Hc correct bunion,simple       Hc removal of leg veins/ulcer       vein stripping     C repair of rectocele  8/20/99     Hc anter colporrhaphy,blad/vagina  8/20/99     Cystocele Repair     Hc colonoscopy thru stoma, diagnostic  4/04     Hc remv cataract extracap,insert lens  7/17/2003     right     C nonspecific procedure  04/09/2004     Lumbar epidural steroid injection via the posterior interlaminar approach at the follwoing spina level: L4-5.  The procedure was performed with fluoroscopic guidance.  ([facility])     C total abdom hysterectomy  1970's     Hysterectomy, Total Abdominal     C anesth,repair lo hernia ventr/incis  1970's     C total hip arthroplasty  7/04     Hip Replacement, Total, right     Hc ugi endoscopy diag w or w/o brush/wash  03/08/2006     Hc place needle wire preop, breast, initial  04/07/06     Wire locallization of right  breast     Biopsy salivary gland,incisional  03/21/2007     Inferior lip/     Hc inj epidural lumbar/sacral w/wo contrast  2008     lumbar epidural steroid injection via posterior interlaminar approach at following spinal level-L4-5     Hc drain/inj major joint/bursa w/o us  2009     Left shoulder     Hc destruct facet jt nerve, lumb/sacr single level  2009     Colonoscopy  7/16/2013     Procedure: COLONOSCOPY;  colonoscopy;  Surgeon: Tiago Lowery MD;  Location: [facility]       Prior to Admission Medications  Prior to Admission Medications   Prescriptions Last Dose Informant Patient Reported?  Taking?   Acetaminophen (ACTAMIN MAXIMUM STRENGTH OR)   Yes Yes   Sig: Take 500 mg by mouth every 6 hours. Every 4-6 hours for pain   CALCIUM 500 + D 500-125 MG-UNIT OR TABS   Yes Yes   Si A DAY   FISH OIL 1200 MG OR CAPS   Yes Yes   Sig: every day   FLUoxetine (PROZAC) 20 MG capsule   No No   Sig: TAKE ONE CAPSULE BY MOUTH EVERY DAY   HYDROcodone-acetaminophen (NORCO) 5-325 MG per tablet   No No   Sig: TAKE 1 TABLET BY ORAL ROUTE EVERY 6 HOURS AS NEEDED FOR PAIN   MULTI-VITAMIN TABS   OR   No Yes   Si tablet daily   QUEtiapine (SEROQUEL) 25 MG tablet   No Yes   Sig: take 1 and 1/2 tab daily if needed for restlessness/agitation and 1 at bedtime routinely   aspirin 81 MG tablet   Yes Yes   Sig: ONE DAILY   diclofenac (VOLTAREN) 50 MG EC tablet   No Yes   Sig: TAKE ONE TABLET BY MOUTH TWICE A DAY   diclofenac (VOLTAREN) 50 MG EC tablet   No Yes   Sig: TAKE ONE TABLET BY MOUTH TWICE A DAY   donepezil (ARICEPT) 10 MG tablet   No Yes   Sig: TAKE ONE TABLET BY MOUTH EVERY NIGHT AT BEDTIME   hydrocortisone-pramoxine (PROCTOFOAM HC) rectal foam   No No   Sig: Place 1 applicator rectally 2 times daily   levocetirizine (XYZAL) 5 MG tablet   No Yes   Sig: TAKE ONE TABLET BY MOUTH EVERY EVENING   levothyroxine (SYNTHROID, LEVOTHROID) 125 MCG tablet   No Yes   Sig: TAKE ONE TABLET BY MOUTH EVERY DAY   lidocaine (XYLOCAINE) 5 % ointment   No Yes   Sig: Apply topically 2 times daily To affected areas.   loperamide (IMODIUM A-D) 2 MG tablet   No Yes   Sig: Start with 2 tabs (4 mg), then take one tab (2 mg) after each diarrheal stool.  Do not use more than  8 tabs (16 mg) per day.   metoprolol (LOPRESSOR) 25 MG tablet   No Yes   Sig: TAKE ONE TABLET BY MOUTH EVERY MORNING AND TAKE TWO TABLETS BY MOUTH EVERY EVENING   order for DME   No Yes   Sig: Equipment being ordered: Wheelchair basic with foot rests.   polyethylene glycol (MIRALAX) powder   Yes Yes   Si capful every other day   ranitidine (ZANTAC) 300 MG tablet   No  Yes   Sig: TAKE ONE TABLET BY MOUTH EVERY DAY   sennosides (SENOKOT) 8.6 MG tablet   Yes Yes   Sig: Take 2 tablets by mouth 2 times daily      Facility-Administered Medications: None     Allergies  Allergies   Allergen Reactions     Bactrim [Sulfamethoxazole W/Trimethoprim]      Codeine Itching     endocet/percocet caused     Cortisone Acetate      20 years ago doesn't remember exact side effect. Has tolerated steroid injection into joints. 6/2008 noted     Oxycodone Itching       Social History  I have reviewed this patient's social history and updated it with pertinent information if needed. Anaid Dockery  reports that she has never smoked. She has never used smokeless tobacco. She reports that she does not drink alcohol or use illicit drugs.    Family History  I have reviewed this patient's family history and updated it with pertinent information if needed.   Family History   Problem Relation Age of Onset     CANCER Sister      Unknown       Review of Systems  The 10 point Review of Systems is negative other than noted in the HPI or here.     Physical Exam  Temp: 97.8  F (36.6  C) Temp src: Oral BP: 165/87 mmHg   Heart Rate: 65 Resp: 16 SpO2: 97 % O2 Device: None (Room air)    Vital Signs with Ranges  Temp:  [97.8  F (36.6  C)] 97.8  F (36.6  C)  Heart Rate:  [65-67] 65  Resp:  [16] 16  BP: ()/(57-99) 165/87 mmHg  SpO2:  [95 %-100 %] 97 %  0 lbs 0 oz    Constitutional:   Somnolent but will open her eyes briefly and answer questions appropriately, cooperative, no apparent distress, and appears stated age     Eyes:   Lids and lashes normal, pupils equal, round and reactive to light, extra ocular muscles intact, sclera clear, conjunctiva normal     ENT:   Normocephalic, without obvious abnormality, atraumatic, sinuses nontender on palpation, external ears without lesions, oral pharynx with moist mucous membranes, tonsils without erythema or exudates, gums normal and good dentition.     Neck:   Supple,  symmetrical, trachea midline, no adenopathy, thyroid symmetric, not enlarged and no tenderness, skin normal     Hematologic / Lymphatic:   no cervical lymphadenopathy and no supraclavicular lymphadenopathy     Back:   Symmetric, no curvature, spinous processes are non-tender on palpation, paraspinous muscles are non-tender on palpation, no costal vertebral tenderness     Lungs:   No increased work of breathing, good air exchange, clear to auscultation bilaterally, no crackles or wheezing     Cardiovascular:   Normal apical impulse, regular rate and rhythm, normal S1 and S2, no S3 or S4, and no murmur noted     Abdomen:   normal bowel sounds, soft, non-distended, non-tender, no masses palpated, no hepatosplenomegally     Neurologic:   Somnolent but arouses and answers questions appropriately  Cranial nerves II-XII are grossly intact.  Motor is 5 out of 5 bilaterally.    Sensory is intact.         Data  Data reviewed today:  I personally reviewed the EKG tracing showing NSR without ischemic changes.    Recent Labs  Lab 02/11/17  0233   WBC 19.3*   HGB 12.4   MCV 93         POTASSIUM 4.0   CHLORIDE 108   CO2 27   BUN 22   CR 0.96   ANIONGAP 8   DIOGO 8.7   *   ALBUMIN 3.5   PROTTOTAL 7.0   BILITOTAL 0.2   ALKPHOS 56   ALT 20   AST 20       Recent Results (from the past 24 hour(s))   XR Chest Port 1 View    Narrative    XR CHEST PORT 1 VW  2/11/2017 3:37 AM      HISTORY: Fever.    COMPARISON: 2/26/2016.    FINDINGS: Upright portable chest. The heart size is normal. Thoracic  aorta is calcified and tortuous. The lungs are clear. No pneumothorax.      Impression    IMPRESSION: No acute abnormality.   CT Abdomen Pelvis w/o Contrast    Narrative    CT ABDOMEN PELVIS W/O CONTRAST  2/11/2017 4:39 AM      HISTORY: Abdominal pain. Sepsis. Diarrhea. Acidosis.    TECHNIQUE: CT abdomen and pelvis without oral or intravenous contrast.  Radiation dose for this scan was reduced using automated exposure  control,  adjustment of the mA and/or kV according to patient size, or  iterative reconstruction technique.     COMPARISON: None.    FINDINGS:    Abdomen: There is atelectasis and scarring at the lung bases. Small  right pleural effusion or pleural thickening. There are coronary  artery atherosclerotic calcifications. The heart size is normal.  Evaluation of the solid abdominal organs is limited by the lack of  intravenous contrast. The liver, spleen, gallbladder, adrenal glands,  and kidneys are normal in appearance. There is a 1.4 cm cyst in the  pancreas head. The pancreas otherwise appears grossly normal. There is  no abdominal or pelvic lymph node enlargement. There is  atherosclerotic calcification of the aorta and its branches. No  aneurysm.    Pelvis: A right hip arthroplasty causes streak artifact through the  pelvis. There is fluid throughout nondilated small and large bowel to  the rectum. No bowel obstruction. No evidence of bowel wall thickening  or inflammation. There is a catheter in the urinary bladder. Multiple  pelvic phleboliths. No free intraperitoneal gas or fluid. Degenerative  disease and scoliosis in the spine.      Impression    IMPRESSION:  1. Fluid-filled small and large bowel without evidence of obstruction  or inflammation.  2. Coronary artery atherosclerotic calcifications.  3. 1.4 cm pancreas head cyst. Comparison to any old exam or one year  follow-up is recommended.

## 2017-02-11 NOTE — ED NOTES
Pt incontinent of stool but it is decreasing in volume and not as watery. Pt denies any abd pain or nausea at this time.

## 2017-02-11 NOTE — IP AVS SNAPSHOT
[address] [facility]    [address] [place] [address]    Phone:  [phone]                                       After Visit Summary   2/11/2017    Anaid Dockery    MRN: [id number]           After Visit Summary Signature Page     I have received my discharge instructions, and my questions have been answered. I have discussed any challenges I see with this plan with the nurse or doctor.    ..........................................................................................................................................  Patient/Patient Representative Signature      ..........................................................................................................................................  Patient Representative Print Name and Relationship to Patient    ..................................................               ................................................  Date                                            Time    ..........................................................................................................................................  Reviewed by Signature/Title    ...................................................              ..............................................  Date                                                            Time

## 2017-02-11 NOTE — ED NOTES
Writer called Cony that is in charge at the [facility] and left a message to call hospital. The number is [phone]

## 2017-02-11 NOTE — ED NOTES
Pt lives at a group home that is in her daughters home. Daughter is currently in [place] and called to report that pt has had no medication changes from her last visit. Pt has another daughter here but she is unaware of what times pt takes her medication or her last doses.

## 2017-02-11 NOTE — ED NOTES
Pt alert and talkative, reports she was having abd pain this evening but denies any at this time. C/O nausea. When pt arrived she was incontinent of loose stool. Pt washed up, no redness or open areas noted to buttocks or back.

## 2017-02-12 PROBLEM — D64.9 ANEMIA: Status: ACTIVE | Noted: 2017-02-12

## 2017-02-12 PROBLEM — E87.6 HYPOPOTASSEMIA: Status: ACTIVE | Noted: 2017-02-12

## 2017-02-12 PROBLEM — E87.29 HYPERCHLOREMIC METABOLIC ACIDOSIS: Status: ACTIVE | Noted: 2017-02-12

## 2017-02-12 PROBLEM — G93.40 ACUTE ENCEPHALOPATHY: Status: RESOLVED | Noted: 2017-02-11 | Resolved: 2017-02-12

## 2017-02-12 PROBLEM — R57.1 HYPOVOLEMIC SHOCK (H): Status: ACTIVE | Noted: 2017-02-11

## 2017-02-12 LAB
ANION GAP SERPL CALCULATED.3IONS-SCNC: 9 MMOL/L (ref 3–14)
BACTERIA SPEC CULT: NORMAL
BASE DEFICIT BLDV-SCNC: 5.3 MMOL/L
BUN SERPL-MCNC: 12 MG/DL (ref 7–30)
CALCIUM SERPL-MCNC: 7.1 MG/DL (ref 8.5–10.1)
CHLORIDE SERPL-SCNC: 117 MMOL/L (ref 94–109)
CO2 SERPL-SCNC: 18 MMOL/L (ref 20–32)
CREAT SERPL-MCNC: 0.78 MG/DL (ref 0.52–1.04)
ERYTHROCYTE [DISTWIDTH] IN BLOOD BY AUTOMATED COUNT: 13.2 % (ref 10–15)
GFR SERPL CREATININE-BSD FRML MDRD: 70 ML/MIN/1.7M2
GLUCOSE SERPL-MCNC: 87 MG/DL (ref 70–99)
HCO3 BLDV-SCNC: 20 MMOL/L (ref 21–28)
HCT VFR BLD AUTO: 30.9 % (ref 35–47)
HGB BLD-MCNC: 9.8 G/DL (ref 11.7–15.7)
LACTATE BLD-SCNC: 0.6 MMOL/L (ref 0.7–2.1)
MCH RBC QN AUTO: 29.8 PG (ref 26.5–33)
MCHC RBC AUTO-ENTMCNC: 31.7 G/DL (ref 31.5–36.5)
MCV RBC AUTO: 94 FL (ref 78–100)
MICRO REPORT STATUS: NORMAL
O2/TOTAL GAS SETTING VFR VENT: 21 %
PCO2 BLDV: 36 MM HG (ref 40–50)
PH BLDV: 7.35 PH (ref 7.32–7.43)
PLATELET # BLD AUTO: 199 10E9/L (ref 150–450)
PO2 BLDV: 58 MM HG (ref 25–47)
POTASSIUM SERPL-SCNC: 3.1 MMOL/L (ref 3.4–5.3)
POTASSIUM SERPL-SCNC: 3.6 MMOL/L (ref 3.4–5.3)
RBC # BLD AUTO: 3.29 10E12/L (ref 3.8–5.2)
SODIUM SERPL-SCNC: 144 MMOL/L (ref 133–144)
SPECIMEN SOURCE: NORMAL
WBC # BLD AUTO: 6.6 10E9/L (ref 4–11)

## 2017-02-12 PROCEDURE — 25000128 H RX IP 250 OP 636: Performed by: INTERNAL MEDICINE

## 2017-02-12 PROCEDURE — A9270 NON-COVERED ITEM OR SERVICE: HCPCS | Mod: GY | Performed by: INTERNAL MEDICINE

## 2017-02-12 PROCEDURE — 36415 COLL VENOUS BLD VENIPUNCTURE: CPT | Performed by: PEDIATRICS

## 2017-02-12 PROCEDURE — 80048 BASIC METABOLIC PNL TOTAL CA: CPT | Performed by: PEDIATRICS

## 2017-02-12 PROCEDURE — 25000132 ZZH RX MED GY IP 250 OP 250 PS 637: Mod: GY | Performed by: PEDIATRICS

## 2017-02-12 PROCEDURE — 85027 COMPLETE CBC AUTOMATED: CPT | Performed by: PEDIATRICS

## 2017-02-12 PROCEDURE — 25800025 ZZH RX 258: Performed by: PEDIATRICS

## 2017-02-12 PROCEDURE — 25000132 ZZH RX MED GY IP 250 OP 250 PS 637: Mod: GY | Performed by: INTERNAL MEDICINE

## 2017-02-12 PROCEDURE — 25000125 ZZHC RX 250: Performed by: PEDIATRICS

## 2017-02-12 PROCEDURE — 99232 SBSQ HOSP IP/OBS MODERATE 35: CPT | Performed by: PEDIATRICS

## 2017-02-12 PROCEDURE — 25000128 H RX IP 250 OP 636: Performed by: PEDIATRICS

## 2017-02-12 PROCEDURE — 84132 ASSAY OF SERUM POTASSIUM: CPT | Performed by: PEDIATRICS

## 2017-02-12 PROCEDURE — 12000000 ZZH R&B MED SURG/OB

## 2017-02-12 PROCEDURE — 82803 BLOOD GASES ANY COMBINATION: CPT | Performed by: PEDIATRICS

## 2017-02-12 PROCEDURE — A9270 NON-COVERED ITEM OR SERVICE: HCPCS | Mod: GY | Performed by: PEDIATRICS

## 2017-02-12 PROCEDURE — 83605 ASSAY OF LACTIC ACID: CPT | Performed by: PEDIATRICS

## 2017-02-12 RX ORDER — POTASSIUM CHLORIDE 29.8 MG/ML
20 INJECTION INTRAVENOUS
Status: DISCONTINUED | OUTPATIENT
Start: 2017-02-12 | End: 2017-02-13 | Stop reason: HOSPADM

## 2017-02-12 RX ORDER — DEXTROSE MONOHYDRATE, SODIUM CHLORIDE, AND POTASSIUM CHLORIDE 50; 1.49; 4.5 G/1000ML; G/1000ML; G/1000ML
INJECTION, SOLUTION INTRAVENOUS CONTINUOUS
Status: DISCONTINUED | OUTPATIENT
Start: 2017-02-12 | End: 2017-02-13 | Stop reason: HOSPADM

## 2017-02-12 RX ORDER — POTASSIUM CHLORIDE 1.5 G/1.58G
20-40 POWDER, FOR SOLUTION ORAL
Status: DISCONTINUED | OUTPATIENT
Start: 2017-02-12 | End: 2017-02-13 | Stop reason: HOSPADM

## 2017-02-12 RX ORDER — POTASSIUM CHLORIDE 1500 MG/1
20-40 TABLET, EXTENDED RELEASE ORAL
Status: DISCONTINUED | OUTPATIENT
Start: 2017-02-12 | End: 2017-02-13 | Stop reason: HOSPADM

## 2017-02-12 RX ORDER — LOPERAMIDE HCL 2 MG
2 CAPSULE ORAL 4 TIMES DAILY
Status: DISCONTINUED | OUTPATIENT
Start: 2017-02-12 | End: 2017-02-13 | Stop reason: HOSPADM

## 2017-02-12 RX ORDER — POTASSIUM CHLORIDE 7.45 MG/ML
10 INJECTION INTRAVENOUS
Status: DISCONTINUED | OUTPATIENT
Start: 2017-02-12 | End: 2017-02-13 | Stop reason: HOSPADM

## 2017-02-12 RX ADMIN — LEVOTHYROXINE SODIUM 125 MCG: 25 TABLET ORAL at 08:18

## 2017-02-12 RX ADMIN — SODIUM CHLORIDE: 9 INJECTION, SOLUTION INTRAVENOUS at 01:45

## 2017-02-12 RX ADMIN — TAZOBACTAM SODIUM AND PIPERACILLIN SODIUM 3.38 G: 375; 3 INJECTION, SOLUTION INTRAVENOUS at 05:51

## 2017-02-12 RX ADMIN — LOPERAMIDE HYDROCHLORIDE 2 MG: 2 CAPSULE ORAL at 13:09

## 2017-02-12 RX ADMIN — METRONIDAZOLE 250 MG: 250 TABLET ORAL at 21:48

## 2017-02-12 RX ADMIN — ASPIRIN 81 MG: 81 TABLET, COATED ORAL at 08:18

## 2017-02-12 RX ADMIN — POTASSIUM CHLORIDE, DEXTROSE MONOHYDRATE AND SODIUM CHLORIDE: 150; 5; 450 INJECTION, SOLUTION INTRAVENOUS at 13:08

## 2017-02-12 RX ADMIN — LOPERAMIDE HYDROCHLORIDE 2 MG: 2 CAPSULE ORAL at 16:15

## 2017-02-12 RX ADMIN — DICLOFENAC SODIUM 50 MG: 50 TABLET, DELAYED RELEASE ORAL at 21:24

## 2017-02-12 RX ADMIN — VANCOMYCIN HYDROCHLORIDE 1500 MG: 1 INJECTION, POWDER, LYOPHILIZED, FOR SOLUTION INTRAVENOUS at 06:20

## 2017-02-12 RX ADMIN — LOPERAMIDE HYDROCHLORIDE 2 MG: 2 CAPSULE ORAL at 21:23

## 2017-02-12 RX ADMIN — RANITIDINE HYDROCHLORIDE 300 MG: 150 TABLET, FILM COATED ORAL at 08:18

## 2017-02-12 RX ADMIN — POTASSIUM CHLORIDE 40 MEQ: 1500 TABLET, EXTENDED RELEASE ORAL at 13:07

## 2017-02-12 RX ADMIN — METRONIDAZOLE 250 MG: 250 TABLET ORAL at 06:19

## 2017-02-12 RX ADMIN — METRONIDAZOLE 250 MG: 250 TABLET ORAL at 13:09

## 2017-02-12 RX ADMIN — POTASSIUM CHLORIDE, DEXTROSE MONOHYDRATE AND SODIUM CHLORIDE: 150; 5; 450 INJECTION, SOLUTION INTRAVENOUS at 21:49

## 2017-02-12 RX ADMIN — POTASSIUM CHLORIDE 20 MEQ: 1500 TABLET, EXTENDED RELEASE ORAL at 14:56

## 2017-02-12 RX ADMIN — QUETIAPINE FUMARATE 25 MG: 25 TABLET, FILM COATED ORAL at 21:23

## 2017-02-12 RX ADMIN — SODIUM CHLORIDE: 9 INJECTION, SOLUTION INTRAVENOUS at 11:05

## 2017-02-12 RX ADMIN — FLUOXETINE 20 MG: 20 CAPSULE ORAL at 08:18

## 2017-02-12 RX ADMIN — DONEPEZIL HYDROCHLORIDE 10 MG: 5 TABLET, FILM COATED ORAL at 21:23

## 2017-02-12 RX ADMIN — DICLOFENAC SODIUM 50 MG: 50 TABLET, DELAYED RELEASE ORAL at 08:55

## 2017-02-12 RX ADMIN — LORATADINE 10 MG: 10 TABLET ORAL at 21:24

## 2017-02-12 NOTE — PROGRESS NOTES
DATE:  2/11/2017   TIME OF RECEIPT FROM LAB:  1820, called from [facility]  LAB TEST:  norovirus  LAB VALUE:  positive  RESULTS GIVEN WITH READ-BACK TO (PROVIDER):  Driss GOULD LAB VALUE REPORTED TO PROVIDER:   1821

## 2017-02-12 NOTE — PLAN OF CARE
Problem: Goal Outcome Summary  Goal: Goal Outcome Summary  Outcome: Improving  S-(situation): shift note     B-(background): Norovirus     A-(assessment): Pt is more alert today.  States is tired and has taken a nap.  VSS.  Afebrile.  Stools have lessened.  Still watery and yellowish-brown in color.  Started on imodium to help slow the diarrhea.  Replaced K+ today,(3.1).  Will recheck her labs.       R-(recommendations): Will cont to monitor stools, labs, and VS.

## 2017-02-12 NOTE — PLAN OF CARE
Problem: Goal Outcome Summary  Goal: Goal Outcome Summary  Outcome: Improving  Assumed care of pt at 1900. VSS, afebrile. Pt has some confusion, not oriented to place or situation. Bed alarm activated. Attempted to get pt up to bedside commode, pt appeared weak and was unable to follow guidance directions. Bedpan used at this time for safety, no bm during this shift. Pt resting comfortably at this time.

## 2017-02-13 VITALS
WEIGHT: 184.08 LBS | OXYGEN SATURATION: 93 % | RESPIRATION RATE: 18 BRPM | SYSTOLIC BLOOD PRESSURE: 168 MMHG | TEMPERATURE: 98.5 F | DIASTOLIC BLOOD PRESSURE: 82 MMHG | HEART RATE: 64 BPM | BODY MASS INDEX: 30.63 KG/M2

## 2017-02-13 LAB
ANION GAP SERPL CALCULATED.3IONS-SCNC: 8 MMOL/L (ref 3–14)
BACTERIA SPEC CULT: NORMAL
BASE DEFICIT BLDV-SCNC: 5 MMOL/L
CHLORIDE SERPL-SCNC: 116 MMOL/L (ref 94–109)
CO2 SERPL-SCNC: 22 MMOL/L (ref 20–32)
HCO3 BLDV-SCNC: 20 MMOL/L (ref 21–28)
HGB BLD-MCNC: 10.2 G/DL (ref 11.7–15.7)
MAGNESIUM SERPL-MCNC: 1.7 MG/DL (ref 1.6–2.3)
MICRO REPORT STATUS: NORMAL
O2/TOTAL GAS SETTING VFR VENT: 21 %
PCO2 BLDV: 34 MM HG (ref 40–50)
PH BLDV: 7.37 PH (ref 7.32–7.43)
PHOSPHATE SERPL-MCNC: 1.4 MG/DL (ref 2.5–4.5)
PO2 BLDV: 73 MM HG (ref 25–47)
POTASSIUM SERPL-SCNC: 3.8 MMOL/L (ref 3.4–5.3)
SODIUM SERPL-SCNC: 146 MMOL/L (ref 133–144)
SPECIMEN SOURCE: NORMAL

## 2017-02-13 PROCEDURE — 25000132 ZZH RX MED GY IP 250 OP 250 PS 637: Mod: GY | Performed by: INTERNAL MEDICINE

## 2017-02-13 PROCEDURE — 84100 ASSAY OF PHOSPHORUS: CPT | Performed by: PEDIATRICS

## 2017-02-13 PROCEDURE — 99239 HOSP IP/OBS DSCHRG MGMT >30: CPT | Performed by: FAMILY MEDICINE

## 2017-02-13 PROCEDURE — 82803 BLOOD GASES ANY COMBINATION: CPT | Performed by: PEDIATRICS

## 2017-02-13 PROCEDURE — A9270 NON-COVERED ITEM OR SERVICE: HCPCS | Mod: GY | Performed by: PEDIATRICS

## 2017-02-13 PROCEDURE — 83735 ASSAY OF MAGNESIUM: CPT | Performed by: PEDIATRICS

## 2017-02-13 PROCEDURE — 36415 COLL VENOUS BLD VENIPUNCTURE: CPT | Performed by: PEDIATRICS

## 2017-02-13 PROCEDURE — 99207 ZZC MOONLIGHTING INDICATOR: CPT | Performed by: FAMILY MEDICINE

## 2017-02-13 PROCEDURE — A9270 NON-COVERED ITEM OR SERVICE: HCPCS | Mod: GY | Performed by: INTERNAL MEDICINE

## 2017-02-13 PROCEDURE — 25000132 ZZH RX MED GY IP 250 OP 250 PS 637: Mod: GY | Performed by: PEDIATRICS

## 2017-02-13 PROCEDURE — 85018 HEMOGLOBIN: CPT | Performed by: PEDIATRICS

## 2017-02-13 PROCEDURE — 80051 ELECTROLYTE PANEL: CPT | Performed by: PEDIATRICS

## 2017-02-13 PROCEDURE — 25800025 ZZH RX 258: Performed by: PEDIATRICS

## 2017-02-13 RX ORDER — METOPROLOL TARTRATE 50 MG
50 TABLET ORAL 2 TIMES DAILY
Status: DISCONTINUED | OUTPATIENT
Start: 2017-02-13 | End: 2017-02-13 | Stop reason: HOSPADM

## 2017-02-13 RX ADMIN — FLUOXETINE 20 MG: 20 CAPSULE ORAL at 08:12

## 2017-02-13 RX ADMIN — DICLOFENAC SODIUM 50 MG: 50 TABLET, DELAYED RELEASE ORAL at 08:14

## 2017-02-13 RX ADMIN — POTASSIUM CHLORIDE, DEXTROSE MONOHYDRATE AND SODIUM CHLORIDE: 150; 5; 450 INJECTION, SOLUTION INTRAVENOUS at 08:11

## 2017-02-13 RX ADMIN — LOPERAMIDE HYDROCHLORIDE 2 MG: 2 CAPSULE ORAL at 16:42

## 2017-02-13 RX ADMIN — METOPROLOL TARTRATE 50 MG: 50 TABLET, FILM COATED ORAL at 04:14

## 2017-02-13 RX ADMIN — METRONIDAZOLE 250 MG: 250 TABLET ORAL at 08:12

## 2017-02-13 RX ADMIN — ASPIRIN 81 MG: 81 TABLET, COATED ORAL at 08:12

## 2017-02-13 RX ADMIN — LEVOTHYROXINE SODIUM 125 MCG: 25 TABLET ORAL at 08:12

## 2017-02-13 RX ADMIN — LOPERAMIDE HYDROCHLORIDE 2 MG: 2 CAPSULE ORAL at 08:12

## 2017-02-13 RX ADMIN — RANITIDINE HYDROCHLORIDE 300 MG: 150 TABLET, FILM COATED ORAL at 08:12

## 2017-02-13 NOTE — PROGRESS NOTES
Pt was unable to stand to go on commode.  More lethargic tonight than last night.  Was slightly shaking - warm blanket applied, temp 97.2, orally.

## 2017-02-13 NOTE — DISCHARGE SUMMARY
[employer] Discharge Summary    Anaid Dockery MRN# [id number]   Age: 87 year old YOB: 1930     Date of Admission:  2/11/2017  Date of Discharge::  2/13/2017  Admitting Physician:  Escobar Mckeon MD  Discharge Physician:  Escobar Reece MD, MD  Primary Physician: Tai Kim  Transferring Facility: N/A     Home clinic: [facility]          Admission Diagnoses:   Diarrhea of presumed infectious origin [A09]  Dehydration [E86.0]  Leukemoid reaction [D72.823]  Chills [R68.83]  Lactic acid acidosis [E87.2]  Advanced directives, counseling/discussion [Z71.89]  Pyuria [N39.0]  CKD (chronic kidney disease) stage 3, GFR 30-59 ml/min [N18.3]  Severe sepsis with septic shock (H) [A41.9, R65.21]  Risk for falls [Z91.81]  Hypotension determined by examination [I95.9]  Dementia without behavioral disturbance, unspecified dementia type [F03.90]          Discharge Diagnosis:   Principle diagnosis: Norovirus gastroenteritis  Secondary diagnoses:  Principal Problem:    Enteritis due to Norovirus (2/11/2017)  Active Problems:    Dehydration    CKD (chronic kidney disease) stage 3, GFR 30-59 ml/min (12/27/2011)    Dementia without behavioral disturbance, unspecified dementia type (11/25/2015)    Hypothyroidism, unspecified type (6/8/2016)    Essential hypertension with goal blood pressure less than 140/90 (6/27/2011)    Chronic pain syndrome (9/7/2016)    Possible Hypovolemic shock (H) - hypotension, elevated lactate (2/11/2017)    Generalized muscle weakness (2/11/2017)    Hypopotassemia (2/12/2017)    Hyperchloremic metabolic acidosis (2/12/2017)    Anemia (2/12/2017)             Procedures:   Imaging performed:   Chest x-ray  Abdomen CT  Pelvis CT            Allergies:      Allergies   Allergen Reactions     Bactrim [Sulfamethoxazole W/Trimethoprim]      Codeine Itching     endocet/percocet caused     Cortisone Acetate      20 years ago doesn't remember exact side effect.  Has tolerated steroid injection into joints. 6/2008 noted     Oxycodone Itching             Medications Prior to Admission:     Prescriptions Prior to Admission   Medication Sig Dispense Refill Last Dose     ranitidine (ZANTAC) 300 MG tablet TAKE ONE TABLET BY MOUTH EVERY DAY 90 tablet 3 2/10/2017 at Unknown time     diclofenac (VOLTAREN) 50 MG EC tablet TAKE ONE TABLET BY MOUTH TWICE A  tablet 0 2/10/2017 at Unknown time     aspirin 81 MG tablet ONE DAILY 100 tablet 3 2/10/2017 at Unknown time     hydrocortisone-pramoxine (PROCTOFOAM HC) rectal foam Place 1 applicator rectally 2 times daily 10 g 2 2/10/2017 at Unknown time     QUEtiapine (SEROQUEL) 25 MG tablet take 1 and 1/2 tab daily if needed for restlessness/agitation and 1 at bedtime routinely 225 tablet 1 2/10/2017 at Unknown time     FLUoxetine (PROZAC) 20 MG capsule TAKE ONE CAPSULE BY MOUTH EVERY DAY 90 capsule 3 2/10/2017 at Unknown time     levocetirizine (XYZAL) 5 MG tablet TAKE ONE TABLET BY MOUTH EVERY EVENING 30 tablet 11 2/10/2017 at Unknown time     order for DME Equipment being ordered: Wheelchair basic with foot rests. 1 Device 0      donepezil (ARICEPT) 10 MG tablet TAKE ONE TABLET BY MOUTH EVERY NIGHT AT BEDTIME 90 tablet 2 2/10/2017 at Unknown time     metoprolol (LOPRESSOR) 25 MG tablet TAKE ONE TABLET BY MOUTH EVERY MORNING AND TAKE TWO TABLETS BY MOUTH EVERY EVENING 270 tablet 2 2/10/2017 at Unknown time     levothyroxine (SYNTHROID, LEVOTHROID) 125 MCG tablet TAKE ONE TABLET BY MOUTH EVERY DAY 90 tablet 3 2/10/2017 at Unknown time     FISH OIL 1200 MG OR CAPS every day  0 2/10/2017 at Unknown time     CALCIUM 500 + D 500-125 MG-UNIT OR TABS 1 A DAY   2/10/2017 at Unknown time     MULTI-VITAMIN TABS   OR 1 tablet daily 120 0 2/10/2017 at Unknown time     HYDROcodone-acetaminophen (NORCO) 5-325 MG per tablet TAKE 1 TABLET BY ORAL ROUTE EVERY 6 HOURS AS NEEDED FOR PAIN 60 tablet 0 Unknown at Unknown time     lidocaine (XYLOCAINE) 5 %  ointment Apply topically 2 times daily To affected areas. 50 g 1 Unknown at Unknown time     [DISCONTINUED] polyethylene glycol (MIRALAX) powder 1 capful every other day 510 g 1 2/10/2017 at Unknown time     [DISCONTINUED] sennosides (SENOKOT) 8.6 MG tablet Take 2 tablets by mouth 2 times daily 120 tablet 1 2/10/2017 at Unknown time     loperamide (IMODIUM A-D) 2 MG tablet Start with 2 tabs (4 mg), then take one tab (2 mg) after each diarrheal stool.  Do not use more than  8 tabs (16 mg) per day. 30 tablet 0 Unknown at Unknown time     Acetaminophen (ACTAMIN MAXIMUM STRENGTH OR) Take 500 mg by mouth every 6 hours. Every 4-6 hours for pain   Unknown at Unknown time             Discharge Medications:     Current Discharge Medication List      START taking these medications    Details   phosphorus tablet 250 mg (K PHOS NEUTRAL) 250 MG per tablet Take 1 tablet (250 mg) by mouth 2 times daily for 2 days  Qty: 4 tablet, Refills: 0    Associated Diagnoses: Hypophosphatemia         CONTINUE these medications which have NOT CHANGED    Details   ranitidine (ZANTAC) 300 MG tablet TAKE ONE TABLET BY MOUTH EVERY DAY  Qty: 90 tablet, Refills: 3    Associated Diagnoses: Gastroesophageal reflux disease with esophagitis      diclofenac (VOLTAREN) 50 MG EC tablet TAKE ONE TABLET BY MOUTH TWICE A DAY  Qty: 180 tablet, Refills: 0    Comments: Due for labs for liver function before next refill.  Associated Diagnoses: CKD (chronic kidney disease) stage 3, GFR 30-59 ml/min; Essential hypertension with goal blood pressure less than 140/90      aspirin 81 MG tablet ONE DAILY  Qty: 100 tablet, Refills: 3    Associated Diagnoses: Essential hypertension with goal blood pressure less than 140/90      hydrocortisone-pramoxine (PROCTOFOAM HC) rectal foam Place 1 applicator rectally 2 times daily  Qty: 10 g, Refills: 2    Comments: Profile Rx: patient will contact pharmacy when needed  Associated Diagnoses: External hemorrhoids      QUEtiapine  (SEROQUEL) 25 MG tablet take 1 and 1/2 tab daily if needed for restlessness/agitation and 1 at bedtime routinely  Qty: 225 tablet, Refills: 1    Comments: Profile Rx: patient will contact pharmacy when needed  Associated Diagnoses: Dementia without behavioral disturbance, unspecified dementia type      FLUoxetine (PROZAC) 20 MG capsule TAKE ONE CAPSULE BY MOUTH EVERY DAY  Qty: 90 capsule, Refills: 3    Associated Diagnoses: Major depressive disorder, recurrent episode, mild (H)      levocetirizine (XYZAL) 5 MG tablet TAKE ONE TABLET BY MOUTH EVERY EVENING  Qty: 30 tablet, Refills: 11    Associated Diagnoses: Seasonal allergic rhinitis      order for DME Equipment being ordered: Wheelchair basic with foot rests.  Qty: 1 Device, Refills: 0    Associated Diagnoses: Other osteoarthritis of spine, lumbar region      donepezil (ARICEPT) 10 MG tablet TAKE ONE TABLET BY MOUTH EVERY NIGHT AT BEDTIME  Qty: 90 tablet, Refills: 2    Associated Diagnoses: Dementia      metoprolol (LOPRESSOR) 25 MG tablet TAKE ONE TABLET BY MOUTH EVERY MORNING AND TAKE TWO TABLETS BY MOUTH EVERY EVENING  Qty: 270 tablet, Refills: 2    Associated Diagnoses: Hypertension goal BP (blood pressure) < 140/90      levothyroxine (SYNTHROID, LEVOTHROID) 125 MCG tablet TAKE ONE TABLET BY MOUTH EVERY DAY  Qty: 90 tablet, Refills: 3    Associated Diagnoses: Hypothyroidism, unspecified type      FISH OIL 1200 MG OR CAPS every day  Refills: 0      CALCIUM 500 + D 500-125 MG-UNIT OR TABS 1 A DAY    Associated Diagnoses: Osteoporosis, unspecified      MULTI-VITAMIN TABS   OR 1 tablet daily  Qty: 120, Refills: 0      HYDROcodone-acetaminophen (NORCO) 5-325 MG per tablet TAKE 1 TABLET BY ORAL ROUTE EVERY 6 HOURS AS NEEDED FOR PAIN  Qty: 60 tablet, Refills: 0    Associated Diagnoses: Polymyalgia rheumatica (H)      lidocaine (XYLOCAINE) 5 % ointment Apply topically 2 times daily To affected areas.  Qty: 50 g, Refills: 1    Comments: Profile Rx: patient will contact  pharmacy when needed  Associated Diagnoses: Pain of left upper extremity      loperamide (IMODIUM A-D) 2 MG tablet Start with 2 tabs (4 mg), then take one tab (2 mg) after each diarrheal stool.  Do not use more than  8 tabs (16 mg) per day.  Qty: 30 tablet, Refills: 0    Associated Diagnoses: Diarrhea      Acetaminophen (ACTAMIN MAXIMUM STRENGTH OR) Take 500 mg by mouth every 6 hours. Every 4-6 hours for pain         STOP taking these medications       polyethylene glycol (MIRALAX) powder Comments:   Reason for Stopping:         sennosides (SENOKOT) 8.6 MG tablet Comments:   Reason for Stopping:                     Consultations:   No consultations were requested during this admission          Brief History of Presenting Illness:   Anaid Dockery is a 87 year old female who presents with somnolence. She has been having diarrhea for the last couple of days. She has not had abdominal pain. She denies any urinary symptoms. She had one episode of vomiting on her way to the ED in the ambulance but otherwise has not had any vomiting. There has been no blood noted in the stool. She admits to both chills and sweats. Her diarrhea has slowly worsened over the last couple of days. Tonight she was not responding to caregivers and was incontinent of stool so EMS was called.           Hospital Course:   Pt had slow, but steady improvement in her symptoms during her stay.  Her stool studies were negative for C. Difficile and positive for Norovirus.  Her Flagyl that had been started was stopped.  Her imaging was reassuring.  By the day of discharge, she was eating and drinking normally.  Her frequency of stooling had decreased and volume was also somewhat less.  After some discussion with her caregivers, decision was made that she would likely be able to keep up with gut losses via PO intake.  She also desired discharge to home.  Will finish some phosphate replacement orally upon discharge.  Follow up with PCP next week or later  this week.         Physical Exam:   Vitals were reviewed  Temp: 98.5  F (36.9  C) Temp src: Oral BP: 168/82 Pulse: 64   Resp: 18 SpO2: 93 % O2 Device: None (Room air)    I/O last 3 completed shifts:  In: 1650 [P.O.:900; I.V.:750]  Out: -   Constitutional:   awake, alert, cooperative, no apparent distress, and appears stated age     Lungs:   No increased work of breathing, good air exchange, clear to auscultation bilaterally, no crackles or wheezing     Cardiovascular:   Normal apical impulse, regular rate and rhythm, normal S1 and S2, no S3 or S4, and no murmur noted     Abdomen:   No scars, normal bowel sounds, soft, non-distended, non-tender, no masses palpated, no hepatosplenomegally     Musculoskeletal:   1+ pitting edema bilaterally  full range of motion noted  motor strength is 5 out of 5 all extremities bilaterally              Pending Tests at Discharge:   None         Discharge Instructions and Follow-Up:   Discharge diet: Advance to a regular diet as tolerated   Discharge activity: Activity as tolerated   Discharge follow-up: Follow up with primary care provider in 3-7 days           Discharge Disposition:   Discharged to group home      Attestation:  I have reviewed today's vital signs, notes, medications, labs and imaging.  Amount of time performed on this discharge summary: 35 minutes.    Escobar Reece MD, MD

## 2017-02-13 NOTE — PROGRESS NOTES
S-(situation): Patient discharged to home via wheelchair with nursing staff and family.     B-(background): Enteritis/Norovirus.     A-(assessment): Patient denies pain. Vitals stable. Still stooling. Labs better.     R-(recommendations): Discharge instructions reviewed with patient and patients family. Family verbalized understanding of discharge instructions and follow up appointments.  Will  Phosphorus medication on way out in Pharmacy. Listed belongings gathered and returned to patient.          Discharge Nursing Criteria:     Care Plan and Patient education resolved: Yes    New Medications- pt has been educated about purpose and side effects: Yes    Vaccines  Pneumonia Vaccine verified at discharge: Yes  Influenza status verified at discharge:  Yes

## 2017-02-13 NOTE — PLAN OF CARE
Problem: Goal Outcome Summary  Goal: Goal Outcome Summary  Outcome: Improving  /93 mmh207/82, up with 2 assist and walker several times to BR to urinate, 2 small and 1 large watery stools, ate 50% of both breakfast and lunch, slight confusion noted, alert and cooperative, up in chair for approx. 2-3 hrs., RA-93% sat level.

## 2017-02-13 NOTE — PLAN OF CARE
Problem: Goal Outcome Summary  Goal: Goal Outcome Summary  Outcome: Therapy, progress toward functional goals as expected  S-(situation): shift note     B-(background): Norovirus     A-(assessment): Pt is less alert this shift.  Hard to wake up to get on commode. Had to put her back to be and just change her lying down.  Weak.  Not opening eyes when talking to her.  B/P was elevated - doctor aware of this and order to give her morning lopressor early.

## 2017-02-13 NOTE — PLAN OF CARE
Problem: Goal Outcome Summary  Goal: Goal Outcome Summary  Outcome: No Change  Patients vitals stable on room air. Patient is disoriented x4. Patient K+ was 3.6 at 1900. Patient was up to the chair for dinner. Patient has had 3 watery stools tonight. Patient is up to the commode 2 assist with walker and gait belt. Patient needs persistent redirection, reorientation and instructions. Patient denies pain. Resting comfortably in bed now.

## 2017-02-14 ENCOUNTER — TELEPHONE (OUTPATIENT)
Dept: FAMILY MEDICINE | Facility: CLINIC | Age: 82
End: 2017-02-14

## 2017-02-14 NOTE — PROGRESS NOTES
SPIRITUAL HEALTH SERVICES  SPIRITUAL ASSESSMENT Progress Note  [facility]      Keisha Molina, Eucharistic  from [facility] [employer], provided communion to Anaid Dockery.    Kaiden Blankenship M.Div., [facility]  Staff   Office tel: [phone]

## 2017-02-14 NOTE — TELEPHONE ENCOUNTER
Reason for follow up call: Anaid Dockery appeared on our list for being seen in and recenlty discharge from the Hospital.    Chief Complaint   Patient presents with     Hospital F/U     Hypophosphatemia, Severe Sepsis With Septic Shock       Encounter routed for Clinic RN to call for follow up

## 2017-02-14 NOTE — TELEPHONE ENCOUNTER
ED / Discharge Outreach Protocol    Patient Contact    Attempt # 1    Was call answered?  No.  Unable to leave message...............................POLO Franklin

## 2017-02-15 DIAGNOSIS — I10 ESSENTIAL HYPERTENSION WITH GOAL BLOOD PRESSURE LESS THAN 140/90: ICD-10-CM

## 2017-02-15 DIAGNOSIS — N18.30 CKD (CHRONIC KIDNEY DISEASE) STAGE 3, GFR 30-59 ML/MIN (H): ICD-10-CM

## 2017-02-15 NOTE — TELEPHONE ENCOUNTER
"Hospital/TCU/ED for chronic condition Discharge Protocol    \"Hi, my name is Julia Maya, a registered nurse, and I am calling from [facility].  I am calling to follow up and see how things are going for you after your recent emergency visit/hospital/TCU stay.\"    Tell me how you are doing now that you are home?\" She is doing pretty well. I am out of the country now ( in [place]), but I have someone watching her.  I am coming back Saturday, 2/18/17.\"       Discharge Instructions    \"Let's review your discharge instructions.  What is/are the follow-up recommendations?  Pt. Response: I am not with mom so I don't know. This all happened after we left.     \"Has an appointment with your primary care provider been scheduled?\"   Yes. (confirm)  3/1/17 at 2:15 PM    \"When you see the provider, I would recommend that you bring your medications with you.\"    Medications    \"Tell me what changed about your medicines when you discharged?\"    Changes to chronic meds?    Unknown. Daughter is in [place].     \"What questions do you have about your medications?\"         New diagnoses of heart failure, COPD, diabetes, or MI?    No              Medication reconciliation completed? No, due to daughter is in [place]. Said her mom is NOT a good historian, so she won't know what she is taking.   Was MTM referral placed (*Make sure to put transitions as reason for referral)?   No    Call Summary    \"What questions or concerns do you have about your recent visit and your follow-up care?\"     none    \"If you have questions or things don't continue to improve, we encourage you contact us through the main clinic number (give number).  Even if the clinic is not open, triage nurses are available 24/7 to help you.     We would like you to know that our clinic has extended hours (provide information).  We also have urgent care (provide details on closest location and hours/contact info)\"      \"Thank you for your time and take " "care!\"  POLO Franklin               "

## 2017-02-16 NOTE — TELEPHONE ENCOUNTER
diclofenac (VOLTAREN) 50 MG EC tablet      Last Written Prescription Date: 11/17/16  Last Quantity: 180, # refills: 0  Last Office Visit with [facility] or [facility] prescribing provider: 10/31/16  Next 5 appointments (look out 90 days)     Mar 01, 2017  2:15 PM CST   [facility]    [address] [place] [id number] [phone]                   Creatinine   Date Value Ref Range Status   02/12/2017 0.78 0.52 - 1.04 mg/dL Final     Lab Results   Component Value Date    AST 20 02/11/2017     Lab Results   Component Value Date    ALT 20 02/11/2017     BP Readings from Last 3 Encounters:   02/13/17 168/82   10/31/16 132/62   09/16/16 122/72

## 2017-02-17 LAB
BACTERIA SPEC CULT: NORMAL
BACTERIA SPEC CULT: NORMAL
MICRO REPORT STATUS: NORMAL
MICRO REPORT STATUS: NORMAL
SPECIMEN SOURCE: NORMAL
SPECIMEN SOURCE: NORMAL

## 2017-02-20 NOTE — PROGRESS NOTES
"Anaid Dockery  Gender: female  : 1930  [address]  [facility] [address]  [phone] (home)     Medical Record: [id number]  Pharmacy:    [facility] [place], [place] - [address] [place] [address] [facility]  [place], [place]  Primary Care Provider: Tai Kim    Parent's names are: Data Unavailable (mother) and Data Unavailable (father).      [facility]      Discharge Phone Call:  Key Words/Key Times      Introduction - AIDET (Acknowledge, Introduce, Duration, Explanation)      Empathy-   We are calling to see how you are since your recent stay in the hospital?     Call back COMMENTS:       Clinical Questions -  (f/u appts, medication side effects/purpose, ability to care for self at home) \"For your safety, it is important to us that you understand the purpose and side effects of your medications, can you tell me what your new medications are?\"     Call back COMMENTS:       Staff Recognition -  We like to recognize staff and physicians who have done an excellent job.  Do you remember any people from your care team that you would like recognize?     Call back COMMENTS:       Very Good Care -  We want to provide very good care to all patients.  How was your care?     Call back COMMENTS:       Opportunities for Improvement -  Our goal is to be the best.  Do you have any suggestions for things that we could improve upon?     Call back COMMENTS:       Thank You     1st attempt, no answer. Hanane Burt, RN 2017.         "

## 2017-02-21 NOTE — TELEPHONE ENCOUNTER
Voltaren  Routing refill request to provider for review/approval because:  Drug not on the [facility] refill protocol for associated diagnosis  Last blood pressure elevated, 168/82 on 2/13/17    Fito Tee RN, BSN

## 2017-02-21 NOTE — PROGRESS NOTES
"Anaid Dockery  Gender: female  : 1930  [address]  [facility] [address]  [phone] (home)     Medical Record: [id number]  Pharmacy:    [facility] [place], [place] - [address] [place] [address] [facility]  [place], [place]  Primary Care Provider: Tai Kim    Parent's names are: Data Unavailable (mother) and Data Unavailable (father).      [facility]      Discharge Phone Call:  Key Words/Key Times      Introduction - AIDET (Acknowledge, Introduce, Duration, Explanation)      Empathy-   We are calling to see how you are since your recent stay in the hospital?     Call back COMMENTS: Spoke with daughter Chiquita. Patient is still weak, diarrhea has slowed considerably. Eating and drinking ok.      Clinical Questions -  (f/u appts, medication side effects/purpose, ability to care for self at home) \"For your safety, it is important to us that you understand the purpose and side effects of your medications, can you tell me what your new medications are?\"     Call back COMMENTS: na      Staff Recognition -  We like to recognize staff and physicians who have done an excellent job.  Do you remember any people from your care team that you would like recognize?     Call back COMMENTS: \"Joanne Sousa is an excellent nurse and does such a good job\"      Very Good Care -  We want to provide very good care to all patients.  How was your care?     Call back COMMENTS: good        Opportunities for Improvement -  Our goal is to be the best.  Do you have any suggestions for things that we could improve upon?     Call back COMMENTS: nothing      Thank You   Wil Barbour RN                  "

## 2017-02-24 ENCOUNTER — TELEPHONE (OUTPATIENT)
Dept: FAMILY MEDICINE | Facility: CLINIC | Age: 82
End: 2017-02-24

## 2017-02-24 NOTE — TELEPHONE ENCOUNTER
Reason for call:  Patient reporting a symptom    Symptom or request: fell and injured her lip, skin tear on arm    Duration (how long have symptoms been present): last night    Have you been treated for this before? No    Additional comments: Daphne would like speak to a nurse regarding Anaid's injuries    Phone Number patient can be reached at:  Other phone number:  [phone] *    Best Time:      Can we leave a detailed message on this number:  YES    Call taken on 2/24/2017 at 9:28 AM by Nohemi Lopez

## 2017-02-24 NOTE — TELEPHONE ENCOUNTER
Anaid Dockery is a 87 year old female who fell last night and split her lip and tore her skin on her arm.    NURSING ASSESSMENT:  Description:  Anaid tripped over her walker last night and spit her lip open and tore her skin on her arm.  Onset/duration:  Last night  Precip. factors:  Elderly, recent hospital discharge.  Associated symptoms:  n/a  Improves/worsens symptoms:  n/a  Pain scale (0-10)   0/10  LMP/preg/breast feeding:  n/a  Last exam/Treatment:  n/a  Allergies:   Allergies   Allergen Reactions     Bactrim [Sulfamethoxazole W/Trimethoprim]      Codeine Itching     endocet/percocet caused     Cortisone Acetate      20 years ago doesn't remember exact side effect. Has tolerated steroid injection into joints. 6/2008 noted     Oxycodone Itching         NURSING PLAN: Huddle with provider, plan includes go to ed. Other clinic's have been checked for openings today ([facility], [facility], [facility], [facility])/    RECOMMENDED DISPOSITION:  To ED, another person to drive  Will comply with recommendation: Yes  If further questions/concerns or if symptoms do not improve, worsen or new symptoms develop, call your PCP or [facility] as soon as possible.      Guideline used:  Telephone Triage Protocols for Nurses, Fifth Edition, Harika Angeles RN

## 2017-03-01 ENCOUNTER — TELEPHONE (OUTPATIENT)
Dept: FAMILY MEDICINE | Facility: CLINIC | Age: 82
End: 2017-03-01

## 2017-03-01 ENCOUNTER — OFFICE VISIT (OUTPATIENT)
Dept: FAMILY MEDICINE | Facility: CLINIC | Age: 82
End: 2017-03-01
Payer: MEDICARE

## 2017-03-01 VITALS
OXYGEN SATURATION: 97 % | TEMPERATURE: 97.3 F | SYSTOLIC BLOOD PRESSURE: 138 MMHG | RESPIRATION RATE: 14 BRPM | HEART RATE: 65 BPM | DIASTOLIC BLOOD PRESSURE: 78 MMHG

## 2017-03-01 DIAGNOSIS — F33.0 MAJOR DEPRESSIVE DISORDER, RECURRENT EPISODE, MILD (H): ICD-10-CM

## 2017-03-01 DIAGNOSIS — F03.90 DEMENTIA WITHOUT BEHAVIORAL DISTURBANCE, UNSPECIFIED DEMENTIA TYPE: ICD-10-CM

## 2017-03-01 DIAGNOSIS — L03.211 CELLULITIS OF FACE: Primary | ICD-10-CM

## 2017-03-01 DIAGNOSIS — G89.29 CHRONIC RIGHT SHOULDER PAIN: ICD-10-CM

## 2017-03-01 DIAGNOSIS — I10 ESSENTIAL HYPERTENSION WITH GOAL BLOOD PRESSURE LESS THAN 140/90: ICD-10-CM

## 2017-03-01 DIAGNOSIS — M25.511 CHRONIC RIGHT SHOULDER PAIN: ICD-10-CM

## 2017-03-01 DIAGNOSIS — Z91.81 RISK FOR FALLS: ICD-10-CM

## 2017-03-01 PROCEDURE — 99214 OFFICE O/P EST MOD 30 MIN: CPT | Performed by: FAMILY MEDICINE

## 2017-03-01 RX ORDER — MUPIROCIN 20 MG/G
OINTMENT TOPICAL 3 TIMES DAILY
Qty: 22 G | Refills: 1 | Status: SHIPPED | OUTPATIENT
Start: 2017-03-01 | End: 2018-12-20

## 2017-03-01 ASSESSMENT — ANXIETY QUESTIONNAIRES
7. FEELING AFRAID AS IF SOMETHING AWFUL MIGHT HAPPEN: NOT AT ALL
5. BEING SO RESTLESS THAT IT IS HARD TO SIT STILL: NOT AT ALL
6. BECOMING EASILY ANNOYED OR IRRITABLE: NOT AT ALL
GAD7 TOTAL SCORE: 0
1. FEELING NERVOUS, ANXIOUS, OR ON EDGE: NOT AT ALL
2. NOT BEING ABLE TO STOP OR CONTROL WORRYING: NOT AT ALL
3. WORRYING TOO MUCH ABOUT DIFFERENT THINGS: NOT AT ALL
IF YOU CHECKED OFF ANY PROBLEMS ON THIS QUESTIONNAIRE, HOW DIFFICULT HAVE THESE PROBLEMS MADE IT FOR YOU TO DO YOUR WORK, TAKE CARE OF THINGS AT HOME, OR GET ALONG WITH OTHER PEOPLE: NOT DIFFICULT AT ALL

## 2017-03-01 ASSESSMENT — PATIENT HEALTH QUESTIONNAIRE - PHQ9: 5. POOR APPETITE OR OVEREATING: NOT AT ALL

## 2017-03-01 ASSESSMENT — PAIN SCALES - GENERAL: PAINLEVEL: WORST PAIN (10)

## 2017-03-01 NOTE — PROGRESS NOTES
SUBJECTIVE:                                                    Anaid Dockery is a 87 year old female who presents to clinic today for the following health issues:        Hospital Follow-up Visit:    Hospital/Nursing Home/IP Rehab Facility: [facility]  Date of Admission: 2/11/17  Date of Discharge: 2/13/17  Reason(s) for Admission: diarrhea and dihydrated            Problems taking medications regularly:  None       Medication changes since discharge: just an antibiotic but completed       Problems adhering to non-medication therapy:  None    Summary of hospitalization:  [facility] discharge summary reviewed  Diagnostic Tests/Treatments reviewed.  Follow up needed: Patient does have an aide at home and her daughter is an RN.  Other Healthcare Providers Involved in Patient s Care:         None  Update since discharge: improved. Diet and appetite are improving. No further GI symptoms    Post Discharge Medication Reconciliation: discharge medications reconciled and changed, per note/orders (see AVS).  Plan of care communicated with patient, family and caregiver     Coding guidelines for this visit:  Type of Medical   Decision Making Face-to-Face Visit       within 7 Days of discharge Face-to-Face Visit        within 14 days of discharge   Moderate Complexity 22588 [id number]   High Complexity [id number]            Fall last Friday injury to lip right toe and right forearm    Problem list and histories reviewed & adjusted, as indicated.  Additional history: as documented    Patient fell proximally 6 days ago and split her lower right lip. We were unable to accommodate a visit in the clinic and they have just been treating this injury at home. It is now more red, slightly sore and there is some drainage. They think there may be some injury within the mouth. Fall was just normal fall when patient was not supported by her medical aide or family. Apparently she got up at night and fell going to the  bathroom. She is supposed to tell people but did not do so. Mentation has been okay since the fall. She did scrape her right arm and right skin open on the forearm. She does have bruising on her right foot big toe. She's been able to bear weight on this since then.    Since hospitalization she is doing well with resumption of normal diet with no further nausea, vomiting or diarrhea. She is is active as usual. She has her multiple chronic pain issues and takes pain medication as ordered.    Reviewed and updated as needed this visit by clinical staff  Tobacco  Allergies  Meds  Med Hx  Surg Hx  Fam Hx  Soc Hx      Reviewed and updated as needed this visit by Provider         ROS:  Constitutional, HEENT, cardiovascular, pulmonary, gi and gu systems are negative, except as otherwise noted.    OBJECTIVE:                                                    /78 (BP Location: Right arm, Patient Position: Chair, Cuff Size: Adult Regular)  Pulse 65  Temp 97.3  F (36.3  C) (Temporal)  Resp 14  SpO2 97%  There is no height or weight on file to calculate BMI.  GENERAL: healthy, alert and no distress  EYES: Eyes grossly normal to inspection, PERRL and conjunctivae and sclerae normal  HENT: Patient does have loose teeth bottom center 3. These are not exquisitely loose and only slightly tender. Otherwise it appears to be no injury with in the mouth.  NECK: no adenopathy, no asymmetry, masses, or scars and thyroid normal to palpation  RESP: lungs clear to auscultation - no rales, rhonchi or wheezes  CV: Regular with murmur  ABDOMEN: soft, nontender, no hepatosplenomegaly, no masses and bowel sounds normal  MS: Very ecchymotic great toe right foot but minimally tender to touch or move actively or passively. Usual limited range of motion of shoulders.  SKIN: Patient has a split at the vermilion border mid right lower lip with an opening about 8 mm and 2 about 8 mm depth. It is slightly red and inflamed around this. There is  "some bruising inside the lip. There is some drainage. On her right forearm she has a 4 cm mild flap tear that is healing with no further loose skin. Some bruising in that area then as well  NEURO: Very grossly normal for her.  PSYCH: mentation appears normal and certainly seems to have orientation ×3 today. She will exhibited her long-standing sensory humor at times today. She asks daughter and caregiver to help her remember things. However she does provide accurate information I believe for some things based on confirmation from people with her.    Diagnostic Test Results:  none      ASSESSMENT/PLAN:                                                        BMI:   Estimated body mass index is 30.63 kg/(m^2) as calculated from the following:    Height as of 9/7/16: 5' 5\" (1.651 m).    Weight as of 2/13/17: 184 lb 1.4 oz (83.5 kg).   Weight management plan: Discussed healthy diet and exercise guidelines and patient will follow up in 12 months in clinic to re-evaluate.      1. Cellulitis of face  Will try topical treatment and add oral antibiotic if things do not heal. Unfortunately stitches may have helped this although open wounds here may become infected with stitches. It certainly would heal more quickly perhaps if we had been able to stitch this. Family understands why this did not occur. They chose not to take her to the emergency room.  - mupirocin (BACTROBAN) 2 % ointment; Apply topically 3 times daily  Dispense: 22 g; Refill: 1    2. Major depressive disorder, recurrent episode, mild (H)  Continues but seems to be relatively well-controlled    3. Chronic right shoulder pain  This is a chronic pain syndrome for which she takes narcotics but at 87 years old, urinary drug screening, and other follow-up really does not seem necessary. She is with reliable caregivers. She takes the medication as ordered. She acknowledges today that it does help so she sleeps better and can function some during the day    4. Essential " hypertension with goal blood pressure less than 140/90  Well-controlled as well as all her other chronic illnesses.      MEDICATIONS:  Continue current medications without change  Patient Instructions   Use this ointment for as long as needed.  It should look better in a few days  By Friday I may be willing to use  Oral antibiotics if it does not look better, call.   All else the same.      Time spent with greater than 50% spent in discussion, making the plan, or filling out paperwork with patient for illness/treatments was 25 minutes or more.    Tai Kim MD  [facility]

## 2017-03-01 NOTE — TELEPHONE ENCOUNTER
Daughter, Chiquita called back and PHQ 9 was completed in office visit for today//Marcia Shay/[place]([facility])

## 2017-03-01 NOTE — NURSING NOTE
"Chief Complaint   Patient presents with     Trauma     c/o lip injury DOI: 2/23/17       Initial /78 (BP Location: Right arm, Patient Position: Chair, Cuff Size: Adult Regular)  Pulse 65  Temp 97.3  F (36.3  C) (Temporal)  Resp 14  SpO2 97% Estimated body mass index is 30.63 kg/(m^2) as calculated from the following:    Height as of 9/7/16: 5' 5\" (1.651 m).    Weight as of 2/13/17: 184 lb 1.4 oz (83.5 kg).  Medication Reconciliation: complete    Stacy Ramirez MA 3/1/2017      "

## 2017-03-01 NOTE — MR AVS SNAPSHOT
After Visit Summary   3/1/2017    Anaid Dockery    MRN: [id number]           Patient Information     Date Of Birth          2/1/1930        Visit Information        Provider Department      3/1/2017 2:15 PM Tai Kim MD [facility]        Today's Diagnoses     Cellulitis of face    -  1      Care Instructions    Use this ointment for as long as needed.  It should look better in a few days  By Friday I may be willing to use  Oral antibiotics if it does not look better, call.   All else the same.          Follow-ups after your visit        Who to contact     If you have questions or need follow up information about today's clinic visit or your schedule please contact [facility] directly at [phone].  Normal or non-critical lab and imaging results will be communicated to you by [email or web address], letter or phone within 4 business days after the clinic has received the results. If you do not hear from us within 7 days, please contact the clinic through [email or web address] or phone. If you have a critical or abnormal lab result, we will notify you by phone as soon as possible.  Submit refill requests through [employer] or call your pharmacy and they will forward the refill request to us. Please allow 3 business days for your refill to be completed.          Additional Information About Your Visit        MyCharScint-X Information     [employer] gives you secure access to your electronic health record. If you see a primary care provider, you can also send messages to your care team and make appointments. If you have questions, please call your primary care clinic.  If you do not have a primary care provider, please call [phone] and they will assist you.        Care EveryWhere ID     This is your Care EveryWhere ID. This could be used by other organizations to access your [facility] medical records  [id number]        Your Vitals Were     Pulse Temperature Respirations Pulse Oximetry           65 97.3  F (36.3  C) (Temporal) 14 97%         Blood Pressure from Last 3 Encounters:   03/01/17 138/78   02/13/17 168/82   10/31/16 132/62    Weight from Last 3 Encounters:   02/13/17 184 lb 1.4 oz (83.5 kg)   10/31/16 168 lb (76.2 kg)   09/16/16 164 lb (74.4 kg)              Today, you had the following     No orders found for display         Today's Medication Changes          These changes are accurate as of: 3/1/17  2:57 PM.  If you have any questions, ask your nurse or doctor.               Start taking these medicines.        Dose/Directions    mupirocin 2 % ointment   Commonly known as:  BACTROBAN   Used for:  Cellulitis of face   Started by:  Tai Kim MD        Apply topically 3 times daily   Quantity:  22 g   Refills:  1            Where to get your medicines      These medications were sent to [facility] [address]   [address] [facility] [address]     Phone:  [phone]     mupirocin 2 % ointment                Primary Care Provider Office Phone # Fax #    Tai Kim [phone]       [address]   [facility] [address]        Thank you!     Thank you for choosing [facility]  for your care. Our goal is always to provide you with excellent care. Hearing back from our patients is one way we can continue to improve our services. Please take a few minutes to complete the written survey that you may receive in the mail after your visit with us. Thank you!             Your Updated Medication List - Protect others around you: Learn how to safely use, store and throw away your medicines at www.disposemymeds.org.          This list is accurate as of: 3/1/17  2:57 PM.  Always use your most recent med list.                   Brand Name Dispense Instructions for use    ACTAMIN MAXIMUM STRENGTH OR      Take 500 mg by mouth every 6 hours. Every 4-6 hours for pain       aspirin 81 MG  tablet     100 tablet    ONE DAILY       CALCIUM 500 + D 500-125 MG-UNIT Tabs   Generic drug:  Calcium Carbonate-Vitamin D      1 A DAY       diclofenac 50 MG EC tablet    VOLTAREN    180 tablet    TAKE ONE TABLET BY MOUTH TWO TIMES A DAY . DUE FOR LABS FOR LIVER FUNCTION BEFORE NEXT REFILL.       donepezil 10 MG tablet    ARICEPT    90 tablet    TAKE ONE TABLET BY MOUTH EVERY NIGHT AT BEDTIME       Fish Oil 1200 MG Caps      every day       FLUoxetine 20 MG capsule    PROzac    90 capsule    TAKE ONE CAPSULE BY MOUTH EVERY DAY       HYDROcodone-acetaminophen 5-325 MG per tablet    NORCO    60 tablet    TAKE 1 TABLET BY ORAL ROUTE EVERY 6 HOURS AS NEEDED FOR PAIN       hydrocortisone-pramoxine rectal foam    PROCTOFOAM HC    10 g    Place 1 applicator rectally 2 times daily       levocetirizine 5 MG tablet    XYZAL    30 tablet    TAKE ONE TABLET BY MOUTH EVERY EVENING       levothyroxine 125 MCG tablet    SYNTHROID/LEVOTHROID    90 tablet    TAKE ONE TABLET BY MOUTH EVERY DAY       lidocaine 5 % ointment    XYLOCAINE    50 g    Apply topically 2 times daily To affected areas.       loperamide 2 MG tablet    IMODIUM A-D    30 tablet    Start with 2 tabs (4 mg), then take one tab (2 mg) after each diarrheal stool.  Do not use more than  8 tabs (16 mg) per day.       metoprolol 25 MG tablet    LOPRESSOR    270 tablet    TAKE ONE TABLET BY MOUTH EVERY MORNING AND TAKE TWO TABLETS BY MOUTH EVERY EVENING       Multi-vitamin Tabs tablet   Generic drug:  multivitamin, therapeutic with minerals     120    1 tablet daily       mupirocin 2 % ointment    BACTROBAN    22 g    Apply topically 3 times daily       order for DME     1 Device    Equipment being ordered: Wheelchair basic with foot rests.       QUEtiapine 25 MG tablet    SEROQUEL    225 tablet    take 1 and 1/2 tab daily if needed for restlessness/agitation and 1 at bedtime routinely       ranitidine 300 MG tablet    ZANTAC    90 tablet    TAKE ONE TABLET BY MOUTH  EVERY DAY

## 2017-03-01 NOTE — PATIENT INSTRUCTIONS
Use this ointment for as long as needed.  It should look better in a few days  By Friday I may be willing to use  Oral antibiotics if it does not look better, call.   All else the same.

## 2017-03-01 NOTE — TELEPHONE ENCOUNTER
"Patient was seen today and question 6 on PHQ 9 was not answered - left message on daughter's cell to please call back and let us know score for question \" feeling bad about yourself - or that you are a failure or have let yourself or your family down\"  And the choices for answers would be : not at all, several days, more than half the days or nearly every day. //Marcia Shay/[place]([facility])   "

## 2017-03-02 ASSESSMENT — PATIENT HEALTH QUESTIONNAIRE - PHQ9: SUM OF ALL RESPONSES TO PHQ QUESTIONS 1-9: 6

## 2017-03-02 ASSESSMENT — ANXIETY QUESTIONNAIRES: GAD7 TOTAL SCORE: 0

## 2017-03-03 PROBLEM — E87.29 HYPERCHLOREMIC METABOLIC ACIDOSIS: Status: RESOLVED | Noted: 2017-02-12 | Resolved: 2017-03-03

## 2017-03-03 PROBLEM — A08.11 ENTERITIS DUE TO NOROVIRUS: Status: RESOLVED | Noted: 2017-02-11 | Resolved: 2017-03-03

## 2017-03-03 PROBLEM — R57.1 HYPOVOLEMIC SHOCK (H): Status: RESOLVED | Noted: 2017-02-11 | Resolved: 2017-03-03

## 2017-03-08 DIAGNOSIS — M35.3 POLYMYALGIA RHEUMATICA (H): ICD-10-CM

## 2017-03-09 RX ORDER — HYDROCODONE BITARTRATE AND ACETAMINOPHEN 5; 325 MG/1; MG/1
TABLET ORAL
Qty: 60 TABLET | Refills: 0 | Status: SHIPPED | OUTPATIENT
Start: 2017-03-09 | End: 2017-03-29

## 2017-03-09 NOTE — TELEPHONE ENCOUNTER
Norco       Last Written Prescription Date: 02/09/2017  Last Fill Quantity: 60,  # refills: 0   Last Office Visit with [facility], [facility] or [facility] prescribing provider: 03/01/2017    Thanks  Nicki Mallory [facility]   [phone]

## 2017-03-15 DIAGNOSIS — L03.039 INFECTION OF TOENAIL: Primary | ICD-10-CM

## 2017-03-15 NOTE — TELEPHONE ENCOUNTER
Reason for Call:  Medication or medication refill: New Rx    Do you use a [facility]?  Name of the pharmacy and phone number for the current request:  [facility] [phone]    Name of the medication requested: Antibiotic for pts Toe    Other request: Daughter states she has cellulitis and they would like to get an oral antibiotic for her    Can we leave a detailed message on this number? YES    Phone number patient can be reached at: [phone] (T)    Best Time: any    Call taken on 3/15/2017 at 2:40 PM by Hanane Smith

## 2017-03-17 RX ORDER — CEPHALEXIN 500 MG/1
500 CAPSULE ORAL 2 TIMES DAILY
Qty: 14 CAPSULE | Refills: 0 | Status: SHIPPED | OUTPATIENT
Start: 2017-03-17 | End: 2017-08-21

## 2017-03-17 NOTE — TELEPHONE ENCOUNTER
Msg left on designated answering machine of message below and to please call back and let us know if she would like the antibiotic called in and to what pharmacy [facility] Dr. Tai Kim/Marcia Shay CMA ([facility])

## 2017-03-17 NOTE — TELEPHONE ENCOUNTER
Aware this question was presented 2 days ago. Given appearance of toe injury on the date of her visit, it is not a surprise she may be infected. Cephalexin 500 mg twice daily for 7 days could be ordered if antibiotic is still considered necessary by caregivers. Tai Kim M.D.

## 2017-03-29 DIAGNOSIS — M35.3 POLYMYALGIA RHEUMATICA (H): ICD-10-CM

## 2017-03-29 RX ORDER — HYDROCODONE BITARTRATE AND ACETAMINOPHEN 5; 325 MG/1; MG/1
TABLET ORAL
Qty: 60 TABLET | Refills: 0 | Status: SHIPPED | OUTPATIENT
Start: 2017-03-29 | End: 2017-05-02

## 2017-03-29 NOTE — TELEPHONE ENCOUNTER
Norco       Last Written Prescription Date: 03/09/2017  Last Fill Quantity: 60,  # refills: 0   Last Office Visit with [facility], [facility] or [facility] prescribing provider: 03/01/2017    Thanks  Nicki Mallory [facility]   [phone]

## 2017-04-05 DIAGNOSIS — I10 HYPERTENSION GOAL BP (BLOOD PRESSURE) < 140/90: ICD-10-CM

## 2017-04-07 NOTE — TELEPHONE ENCOUNTER
Metoprolol tartrate      Last Written Prescription Date: 6/28/16  Last Fill Quantity: 270, # refills: 2    Last Office Visit with G, P or [facility] prescribing provider:  3/1/17   Future Office Visit:        BP Readings from Last 3 Encounters:   03/01/17 138/78   02/13/17 168/82   10/31/16 132/62

## 2017-04-10 RX ORDER — METOPROLOL TARTRATE 25 MG/1
TABLET, FILM COATED ORAL
Qty: 270 TABLET | Refills: 1 | Status: SHIPPED | OUTPATIENT
Start: 2017-04-10 | End: 2017-10-09

## 2017-04-10 NOTE — TELEPHONE ENCOUNTER
Prescription approved per [facility] Refill Protocol for 6 months total as NP was above goal. .............POLO Franklin  .

## 2017-04-25 ENCOUNTER — TELEPHONE (OUTPATIENT)
Dept: FAMILY MEDICINE | Facility: CLINIC | Age: 82
End: 2017-04-25

## 2017-04-25 DIAGNOSIS — H10.30 ACUTE CONJUNCTIVITIS, UNSPECIFIED ACUTE CONJUNCTIVITIS TYPE, UNSPECIFIED LATERALITY: Primary | ICD-10-CM

## 2017-04-25 RX ORDER — CIPROFLOXACIN HYDROCHLORIDE 3.5 MG/ML
SOLUTION/ DROPS TOPICAL
Qty: 1 BOTTLE | Refills: 0 | Status: SHIPPED | OUTPATIENT
Start: 2017-04-25 | End: 2017-08-21

## 2017-04-25 NOTE — TELEPHONE ENCOUNTER
Reason for Call:  Other pink eye    Detailed comments: crusted, red, puffy, draining right eye.  Can a prescription be sent to [facility]     Phone Number Patient can be reached at: Home number on file [phone] (home)    Best Time:     Can we leave a detailed message on this number? YES    Call taken on 4/25/2017 at 11:23 AM by Aleisha Georges

## 2017-04-25 NOTE — TELEPHONE ENCOUNTER
Patient's daughter was called and notified of the prescription that was sent to the pharmacy.  No further action is needed.    Yenifer Luu, CMA

## 2017-04-25 NOTE — TELEPHONE ENCOUNTER
"RN Conjunctivitis Protocol: Ages 2 and older  Anaid Dockery is a 87 year old female is having symptoms reviewed for possible conjunctivitis.      ASSESSMENT/PLAN:  Allergy to Sulfa? \"es, when she takes it orally her stomach gets upset, but she doesn't vomit. The eye drops might be fine. \"    RN will have to forward to an MD for approval or recommendation for eye drop for conjunctivitis.  Please let pt know if prescribed or not. Thank you. [phone] (home)   POLO Franklin    1.  Medication Indicated: Yes, medication is indictated , but daughter said her mom has BACTRIM listed as an allergy because when she takes it her stomach gets upset. .    2.  Education regarding contact precautions, hand washing, avoid wearing contacts until finished with drops or until symptoms resolve, contact clinic if there is no improvement of symptoms within 3 days and if develops eye pain or sensitivity to light.   3.  Follow-up: Schedule an appointment with a provider if symptoms do not improve after 3 days of antibiotics or if symptoms return after antibiotic therapy is complete.  4.  Patient verbalized understanding of this plan and is agreeable.    SUBJECTIVE:     Conjunctival symptoms: mattering (yellow/green), burning, itching and watery or pus discharge   Location: both eyes  Onset: 2 days ago  In addition notes: None  Contact Lens use?: No  Complicating factors    Reports:Vision changes; not cleared with blinking, Recent  history of eye trauma, Sensitivity to light, Severe eye pain, Fever: none, Eyelid symptoms just slightly irritated from her rubbing her eye at times.    Denies:Vision changes; not cleared with blinking, Recent  history of eye trauma, Sensitivity to light, Severe eye pain, Fever: none     OBJECTIVE:     Encounter handled by: Nurse Triage with [facility] - provider name: Dr. Mendez.     Julia Maya RN    "

## 2017-05-02 DIAGNOSIS — M35.3 POLYMYALGIA RHEUMATICA (H): ICD-10-CM

## 2017-05-02 DIAGNOSIS — F03.90 DEMENTIA (H): ICD-10-CM

## 2017-05-03 RX ORDER — HYDROCODONE BITARTRATE AND ACETAMINOPHEN 5; 325 MG/1; MG/1
TABLET ORAL
Qty: 60 TABLET | Refills: 0 | Status: SHIPPED | OUTPATIENT
Start: 2017-05-03 | End: 2017-05-24

## 2017-05-03 NOTE — TELEPHONE ENCOUNTER
donepezil       Last Written Prescription Date: 7/20/16  Last Fill Quantity: 90,  # refills: 2   Last Office Visit with G, P or [facility] prescribing provider: 3/1/17                                             Stacy Ramirez MA 5/3/2017

## 2017-05-03 NOTE — TELEPHONE ENCOUNTER
Norco      Last Written Prescription Date: 3/29/17  Last Fill Quantity: 60,  # refills: 0   Last Office Visit with G, P or [facility] prescribing provider: 3/1/17

## 2017-05-04 RX ORDER — DONEPEZIL HYDROCHLORIDE 10 MG/1
TABLET, FILM COATED ORAL
Qty: 90 TABLET | Refills: 2 | Status: SHIPPED | OUTPATIENT
Start: 2017-05-04 | End: 2018-01-20

## 2017-05-04 NOTE — TELEPHONE ENCOUNTER
Prescription approved per [facility] Refill Protocol......POLO Franklin

## 2017-05-18 DIAGNOSIS — I10 ESSENTIAL HYPERTENSION WITH GOAL BLOOD PRESSURE LESS THAN 140/90: ICD-10-CM

## 2017-05-18 DIAGNOSIS — N18.30 CKD (CHRONIC KIDNEY DISEASE) STAGE 3, GFR 30-59 ML/MIN (H): ICD-10-CM

## 2017-05-19 NOTE — TELEPHONE ENCOUNTER
diclofenac (VOLTAREN) 50 MG EC tablet    Last Written Prescription Date:  2/21/17  Last Fill Quantity: 180,   # refills: 0  Last Office Visit with [facility], [facility] or  Health prescribing provider: 3/1/17  Future Office visit:       Routing refill request to provider for review/approval because:  Drug not on the [facility], [facility] or [facility] refill protocol or controlled substance

## 2017-05-24 DIAGNOSIS — M35.3 POLYMYALGIA RHEUMATICA (H): ICD-10-CM

## 2017-05-24 RX ORDER — HYDROCODONE BITARTRATE AND ACETAMINOPHEN 5; 325 MG/1; MG/1
TABLET ORAL
Qty: 60 TABLET | Refills: 0 | Status: SHIPPED | OUTPATIENT
Start: 2017-05-24 | End: 2017-06-19

## 2017-05-24 NOTE — TELEPHONE ENCOUNTER
norco 5-325      Last Written Prescription Date: 05/30/2017  Last Fill Quantity: 60,  # refills: 0   Last Office Visit with [facility], [facility] or  Health prescribing provider: 03/01/2017    Thank You,  Godwin Butcher, Pharmacy [facility] [place]

## 2017-06-05 DIAGNOSIS — E03.9 HYPOTHYROIDISM, UNSPECIFIED TYPE: ICD-10-CM

## 2017-06-06 RX ORDER — LEVOTHYROXINE SODIUM 125 UG/1
TABLET ORAL
Qty: 90 TABLET | Refills: 0 | Status: SHIPPED | OUTPATIENT
Start: 2017-06-06 | End: 2017-08-24 | Stop reason: DRUGHIGH

## 2017-06-06 NOTE — TELEPHONE ENCOUNTER
levothyroxine (SYNTHROID, LEVOTHROID) 125 MCG tablet     Last Written Prescription Date: 6/8/16  Last Quantity: 90, # refills: 3  Last Office Visit with [facility], P or [facility] prescribing provider: 3/1/17        TSH   Date Value Ref Range Status   09/07/2016 0.19 (L) 0.40 - 4.00 mU/L Final

## 2017-06-06 NOTE — TELEPHONE ENCOUNTER
Prescription approved per [facility] Refill Protocol.    Component      Latest Ref Rng & Units 11/25/2015 9/7/2016   TSH      0.40 - 4.00 mU/L 0.12 (L) 0.19 (L)   T4 Free      0.76 - 1.46 ng/dL 1.40 1.39   POLO Franklin

## 2017-06-19 DIAGNOSIS — M35.3 POLYMYALGIA RHEUMATICA (H): ICD-10-CM

## 2017-06-19 RX ORDER — HYDROCODONE BITARTRATE AND ACETAMINOPHEN 5; 325 MG/1; MG/1
TABLET ORAL
Qty: 60 TABLET | Refills: 0 | Status: SHIPPED | OUTPATIENT
Start: 2017-06-19 | End: 2017-07-11

## 2017-06-19 NOTE — TELEPHONE ENCOUNTER
Norco 5-325mg      Last Written Prescription Date: 05/24/2017  Last Fill Quantity: 60,  # refills: 0   Last Office Visit with [facility] or [facility] prescribing provider: 02/24/2017    Michelle Espitia, Pharmacy Technician  [facility]  [phone]

## 2017-06-21 NOTE — TELEPHONE ENCOUNTER
Script brought to [facility] pharmacy. Maru Bradley LPN

## 2017-06-28 ENCOUNTER — TELEPHONE (OUTPATIENT)
Dept: FAMILY MEDICINE | Facility: CLINIC | Age: 82
End: 2017-06-28

## 2017-06-28 DIAGNOSIS — G89.4 CHRONIC PAIN SYNDROME: ICD-10-CM

## 2017-06-28 DIAGNOSIS — N18.30 CKD (CHRONIC KIDNEY DISEASE) STAGE 3, GFR 30-59 ML/MIN (H): ICD-10-CM

## 2017-06-28 DIAGNOSIS — M25.511 CHRONIC RIGHT SHOULDER PAIN: ICD-10-CM

## 2017-06-28 DIAGNOSIS — G89.29 CHRONIC RIGHT SHOULDER PAIN: ICD-10-CM

## 2017-06-28 DIAGNOSIS — G47.00 PERSISTENT INSOMNIA: ICD-10-CM

## 2017-06-28 DIAGNOSIS — F33.0 MAJOR DEPRESSIVE DISORDER, RECURRENT EPISODE, MILD (H): ICD-10-CM

## 2017-06-28 DIAGNOSIS — F03.90 DEMENTIA WITHOUT BEHAVIORAL DISTURBANCE, UNSPECIFIED DEMENTIA TYPE: Primary | ICD-10-CM

## 2017-06-28 NOTE — TELEPHONE ENCOUNTER
Daughter, Daphne notified of hospice referral and was given number to contact them to start services [facility]OR[facility] Dr. Tai Kim/Marcia Shay CMA ([facility])

## 2017-06-28 NOTE — TELEPHONE ENCOUNTER
Reason for Call: Request for an order or referral:    Order or referral being requested: Patient's daughter called. Said that Anaid has been extra confused. Her dementia has been getting much worse.  Would like to set up hocpice.     Date needed: as soon as possible    Has the patient been seen by the PCP for this problem? YES    Additional comments:     Phone number Patient can be reached at:  Other phone number:  [phone]    Best Time:  any    Can we leave a detailed message on this number?  YES    Call taken on 6/28/2017 at 10:46 AM by Carolyn Bennett

## 2017-07-11 DIAGNOSIS — M35.3 POLYMYALGIA RHEUMATICA (H): ICD-10-CM

## 2017-07-11 NOTE — TELEPHONE ENCOUNTER
norco    Last Written Prescription Date: 6/19/17   Last Fill Quantity: 60,  # refills: 0   Last Office Visit with G, [facility] or [facility] prescribing provider: 3/1/17    Lorraine Cowan  [facility].  [facility]  [phone]

## 2017-07-12 RX ORDER — HYDROCODONE BITARTRATE AND ACETAMINOPHEN 5; 325 MG/1; MG/1
TABLET ORAL
Qty: 60 TABLET | Refills: 0 | Status: SHIPPED | OUTPATIENT
Start: 2017-07-12 | End: 2017-07-31

## 2017-07-31 DIAGNOSIS — M35.3 POLYMYALGIA RHEUMATICA (H): ICD-10-CM

## 2017-07-31 RX ORDER — HYDROCODONE BITARTRATE AND ACETAMINOPHEN 5; 325 MG/1; MG/1
TABLET ORAL
Qty: 60 TABLET | Refills: 0 | Status: SHIPPED | OUTPATIENT
Start: 2017-07-31 | End: 2017-08-21

## 2017-07-31 NOTE — TELEPHONE ENCOUNTER
Norco      Last Written Prescription Date: 7/12/17  Last Fill Quantity: 60,  # refills: 0   Last Office Visit with G, P or [facility] prescribing provider: 3/1/17

## 2017-08-21 ENCOUNTER — OFFICE VISIT (OUTPATIENT)
Dept: FAMILY MEDICINE | Facility: OTHER | Age: 82
End: 2017-08-21
Payer: MEDICARE

## 2017-08-21 VITALS — RESPIRATION RATE: 12 BRPM

## 2017-08-21 DIAGNOSIS — E03.9 HYPOTHYROIDISM, UNSPECIFIED TYPE: ICD-10-CM

## 2017-08-21 DIAGNOSIS — N30.00 ACUTE CYSTITIS WITHOUT HEMATURIA: Primary | ICD-10-CM

## 2017-08-21 DIAGNOSIS — R35.0 URINARY FREQUENCY: ICD-10-CM

## 2017-08-21 DIAGNOSIS — R82.90 NONSPECIFIC FINDING ON EXAMINATION OF URINE: ICD-10-CM

## 2017-08-21 DIAGNOSIS — M35.3 POLYMYALGIA RHEUMATICA (H): ICD-10-CM

## 2017-08-21 DIAGNOSIS — I10 ESSENTIAL HYPERTENSION WITH GOAL BLOOD PRESSURE LESS THAN 140/90: ICD-10-CM

## 2017-08-21 LAB
ALBUMIN UR-MCNC: NEGATIVE MG/DL
APPEARANCE UR: CLEAR
BACTERIA #/AREA URNS HPF: ABNORMAL /HPF
BILIRUB UR QL STRIP: NEGATIVE
COLOR UR AUTO: YELLOW
GLUCOSE UR STRIP-MCNC: NEGATIVE MG/DL
HGB UR QL STRIP: ABNORMAL
KETONES UR STRIP-MCNC: NEGATIVE MG/DL
LEUKOCYTE ESTERASE UR QL STRIP: ABNORMAL
NITRATE UR QL: POSITIVE
NON-SQ EPI CELLS #/AREA URNS LPF: ABNORMAL /LPF
PH UR STRIP: 6 PH (ref 5–7)
RBC #/AREA URNS AUTO: ABNORMAL /HPF
SOURCE: ABNORMAL
SP GR UR STRIP: 1.02 (ref 1–1.03)
UROBILINOGEN UR STRIP-ACNC: 0.2 EU/DL (ref 0.2–1)
WBC #/AREA URNS AUTO: ABNORMAL /HPF

## 2017-08-21 PROCEDURE — 84443 ASSAY THYROID STIM HORMONE: CPT | Performed by: NURSE PRACTITIONER

## 2017-08-21 PROCEDURE — 81001 URINALYSIS AUTO W/SCOPE: CPT | Performed by: NURSE PRACTITIONER

## 2017-08-21 PROCEDURE — 87086 URINE CULTURE/COLONY COUNT: CPT | Performed by: NURSE PRACTITIONER

## 2017-08-21 PROCEDURE — 99214 OFFICE O/P EST MOD 30 MIN: CPT | Performed by: NURSE PRACTITIONER

## 2017-08-21 PROCEDURE — 87186 SC STD MICRODIL/AGAR DIL: CPT | Performed by: NURSE PRACTITIONER

## 2017-08-21 PROCEDURE — 36415 COLL VENOUS BLD VENIPUNCTURE: CPT | Performed by: NURSE PRACTITIONER

## 2017-08-21 PROCEDURE — 87088 URINE BACTERIA CULTURE: CPT | Performed by: NURSE PRACTITIONER

## 2017-08-21 PROCEDURE — 84439 ASSAY OF FREE THYROXINE: CPT | Performed by: NURSE PRACTITIONER

## 2017-08-21 PROCEDURE — 80053 COMPREHEN METABOLIC PANEL: CPT | Performed by: NURSE PRACTITIONER

## 2017-08-21 RX ORDER — CIPROFLOXACIN 250 MG/1
250 TABLET, FILM COATED ORAL 2 TIMES DAILY
Qty: 10 TABLET | Refills: 0 | Status: SHIPPED | OUTPATIENT
Start: 2017-08-21 | End: 2017-08-23 | Stop reason: ALTCHOICE

## 2017-08-21 RX ORDER — HYDROCODONE BITARTRATE AND ACETAMINOPHEN 5; 325 MG/1; MG/1
TABLET ORAL
Qty: 60 TABLET | Refills: 0 | Status: SHIPPED | OUTPATIENT
Start: 2017-08-21 | End: 2017-09-12

## 2017-08-21 NOTE — PATIENT INSTRUCTIONS
- Start ciprofloxicin medication twice daily for 7 days  - Please bring in a urine sample next Monday for a recheck  - If you notice any worsening symptoms- fever/chills, confusion ( increased from baseline), lower back pain, or any other worsening symptoms  - Increase fluid intake  - I will forward your labs to your primary care provider.     JOHN PAUL Wilson CNP

## 2017-08-21 NOTE — TELEPHONE ENCOUNTER
norco      Last Written Prescription Date: 07/31/2017  Last Fill Quantity: 60,  # refills: 0   Last Office Visit with [facility], [facility] or  Health prescribing provider: 03/01/2017                                         Next 5 appointments (look out 90 days)     Aug 21, 2017  1:00 PM CDT   SHORT with JOHN PAUL Quintero CNP   [facility]    [address] [address] [phone]                  Thank You,  Godwin Butcher, Pharmacy [facility] [place]

## 2017-08-21 NOTE — NURSING NOTE
"Chief Complaint   Patient presents with     UTI       Initial BP (P) 128/78  Pulse (P) 61  Temp (P) 98.1  F (36.7  C) (Oral)  Resp 12  Wt (P) 163 lb (73.9 kg)  SpO2 (P) 98%  BMI (P) 27.12 kg/m2 Estimated body mass index is 27.12 kg/(m^2) (pended) as calculated from the following:    Height as of 9/7/16: 5' 5\" (1.651 m).    Weight as of this encounter: (P) 163 lb (73.9 kg).  Medication Reconciliation: complete  "

## 2017-08-21 NOTE — PROGRESS NOTES
SUBJECTIVE:                                                    Anaid Dockery is a 87 year old female who presents to clinic today for the following health issues:      HPI    Patient presents to the clinic today along with her daughter and CMA. She has been experiencing urinary frequency, delay in urine, fever/chills, abdominal fullness. She has a history of UTI's. She has been wiping from front to back at times. She reports no hematuria. She does not report any pain with urination. Daughter reports her mental status is at baseline, no unusual confusion. She has been taking cranberry juice and increasing fluids.     Would also like labs drawn for primary provider medication management.       URINARY TRACT SYMPTOMS  Onset: 2 weeks    Description:   Painful urination (Dysuria): no   Blood in urine (Hematuria): no   Delay in urine (Hesitency): YES    Intensity: mild    Progression of Symptoms:  same    Accompanying Signs & Symptoms:  Fever/chills: Yes  Flank pain no   Nausea and vomiting: no   Any vaginal symptoms: none  Abdominal/Pelvic Pain: YES    History:   History of frequent UTI's: YES  History of kidney stones: no   Sexually Active: no   Possibility of pregnancy: No    Precipitating factors:   Pt has a hemorrhoid, sometimes wiping back to front    Therapies Tried and outcome: cranberry juice    Also having some constipation due to norco    Problem list and histories reviewed & adjusted, as indicated.  Additional history: as documented      ROS:  E/M: NEGATIVE for ear, mouth and throat problems  R: NEGATIVE for significant cough or SOB  CV: NEGATIVE for chest pain, palpitations or peripheral edema    OBJECTIVE:     BP (P) 128/78  Pulse (P) 61  Temp (P) 98.1  F (36.7  C) (Oral)  Resp 12  Wt (P) 163 lb (73.9 kg)  SpO2 (P) 98%  BMI (P) 27.12 kg/m2  Body mass index is 27.12 kg/(m^2) (pended).  GENERAL: healthy, alert and no distress  RESP: lungs clear to auscultation - no rales, rhonchi or wheezes  CV:  regular rate and rhythm, normal S1 S2, no S3 or S4, no murmur, click or rub, no peripheral edema and peripheral pulses strong  ABDOMEN: soft, nontender, no hepatosplenomegaly, no masses and bowel sounds normal  NEURO: Normal strength and tone, mentation intact and speech normal  PSYCH: mentation appears normal, affect normal/bright    Diagnostic Test Results:  Results for orders placed or performed in visit on 08/21/17   UA reflex to Microscopic   Result Value Ref Range    Color Urine Yellow     Appearance Urine Clear     Glucose Urine Negative NEG^Negative mg/dL    Bilirubin Urine Negative NEG^Negative    Ketones Urine Negative NEG^Negative mg/dL    Specific Gravity Urine 1.020 1.003 - 1.035    Blood Urine Trace (A) NEG^Negative    pH Urine 6.0 5.0 - 7.0 pH    Protein Albumin Urine Negative NEG^Negative mg/dL    Urobilinogen Urine 0.2 0.2 - 1.0 EU/dL    Nitrite Urine Positive (A) NEG^Negative    Leukocyte Esterase Urine Large (A) NEG^Negative    Source Unspecified Urine    Urine Microscopic   Result Value Ref Range    WBC Urine 5-10 (A) OTO2^O - 2 /HPF    RBC Urine O - 2 OTO2^O - 2 /HPF    Squamous Epithelial /LPF Urine Few FEW^Few /LPF    Bacteria Urine Few (A) NEG^Negative /HPF   TSH with free T4 reflex   Result Value Ref Range    TSH 0.03 (L) 0.40 - 4.00 mU/L   Comprehensive metabolic panel   Result Value Ref Range    Sodium 142 133 - 144 mmol/L    Potassium 4.2 3.4 - 5.3 mmol/L    Chloride 106 94 - 109 mmol/L    Carbon Dioxide 28 20 - 32 mmol/L    Anion Gap 8 3 - 14 mmol/L    Glucose 102 (H) 70 - 99 mg/dL    Urea Nitrogen 18 7 - 30 mg/dL    Creatinine 0.90 0.52 - 1.04 mg/dL    GFR Estimate 59 (L) >60 mL/min/1.7m2    GFR Estimate If Black 72 >60 mL/min/1.7m2    Calcium 8.8 8.5 - 10.1 mg/dL    Bilirubin Total 0.2 0.2 - 1.3 mg/dL    Albumin 3.5 3.4 - 5.0 g/dL    Protein Total 6.9 6.8 - 8.8 g/dL    Alkaline Phosphatase 53 40 - 150 U/L    ALT 15 0 - 50 U/L    AST 12 0 - 45 U/L   T4 free   Result Value Ref Range     T4 Free 1.69 (H) 0.76 - 1.46 ng/dL   Urine Culture Aerobic Bacterial   Result Value Ref Range    Specimen Description Unspecified Urine     Culture Micro >100,000 colonies/mL  Escherichia coli   (A)        Susceptibility    Escherichia coli - DINESH     AMPICILLIN 16 Intermediate ug/mL     CEFAZOLIN* 8 Sensitive ug/mL      * Cefazolin DINESH breakpoints are for the treatment of uncomplicated urinary tract infections.  For the treatment of systemic infections, please contact the laboratory for additional testing.     CEFOXITIN >=64 Resistant ug/mL     CEFTAZIDIME <=1 Sensitive ug/mL     CEFTRIAXONE <=1 Sensitive ug/mL     CIPROFLOXACIN >=4 Resistant ug/mL     GENTAMICIN >=16 Resistant ug/mL     LEVOFLOXACIN >=8 Resistant ug/mL     NITROFURANTOIN <=16 Sensitive ug/mL     TOBRAMYCIN 4 Sensitive ug/mL     Trimethoprim/Sulfa >=16/304 Resistant ug/mL     AMPICILLIN/SULBACTAM 8 Sensitive ug/mL     Piperacillin/Tazo 8 Sensitive ug/mL     CEFEPIME <=1 Sensitive ug/mL     *Note: Due to a large number of results and/or encounters for the requested time period, some results have not been displayed. A complete set of results can be found in Results Review.       ASSESSMENT/PLAN:     1. Acute cystitis with hematuria  - UA positive for infection  - Start Cipro, will check kidney function today.   - Discussed medication with daughter and CMA, agree to plan. Patient also on probiotic.   - Culture pending.   - Abdomen not tender on exam, this is reassuring. Patient had limited mobility exam was performed while sitting. I did press on all areas of her abdominal regions and I did not notice any grimacing and patient stated she did not have any pain. I noticed after patient left she had questions about constipation, we did not get to this today. When I reach out for culture sensitivities I will advise an RN to discuss this issue with her and see if an appointment is needed.   - Discussed proper hygiene.   - ciprofloxacin (CIPRO) 250 MG  tablet; Take 1 tablet (250 mg) by mouth 2 times daily  Dispense: 10 tablet; Refill: 0  - UA reflex to Microscopic; Future    2. Urinary frequency  - UA reflex to Microscopic; Future  - UA reflex to Microscopic  - Urine Microscopic  - T4 free    3. Nonspecific finding on examination of urine    - Urine Culture Aerobic Bacterial    4. Hypothyroidism, unspecified type  -   - TSH with free T4 reflex    5. Essential hypertension with goal blood pressure less than 140/90  - Blood pressure at goal today.   - Comprehensive metabolic panel      See Patient Instructions  The patient and daughter indicates understanding of these issues and agrees with the plan.      JOHN PAUL Wilson [facility]

## 2017-08-21 NOTE — MR AVS SNAPSHOT
After Visit Summary   8/21/2017    Anaid Dockery    MRN: [id number]           Patient Information     Date Of Birth          2/1/1930        Visit Information        Provider Department      8/21/2017 1:00 PM Lacy Gutierrez APRN CNP [facility]        Today's Diagnoses     Urinary frequency    -  1    Nonspecific finding on examination of urine        Acute cystitis with hematuria        Hypothyroidism, unspecified type        Essential hypertension with goal blood pressure less than 140/90          Care Instructions    - Start ciprofloxicin medication twice daily for 7 days  - Please bring in a urine sample next Monday for a recheck  - If you notice any worsening symptoms- fever/chills, confusion ( increased from baseline), lower back pain, or any other worsening symptoms  - Increase fluid intake  - I will forward your labs to your primary care provider.     JOHN PAUL Wilson CNP            Follow-ups after your visit        Follow-up notes from your care team     Return if symptoms worsen or fail to improve.      Future tests that were ordered for you today     Open Future Orders        Priority Expected Expires Ordered    UA reflex to Microscopic Routine 8/28/2017 8/21/2018 8/21/2017            Who to contact     If you have questions or need follow up information about today's clinic visit or your schedule please contact [facility] directly at [phone].  Normal or non-critical lab and imaging results will be communicated to you by MyChart, letter or phone within 4 business days after the clinic has received the results. If you do not hear from us within 7 days, please contact the clinic through MyChart or phone. If you have a critical or abnormal lab result, we will notify you by phone as soon as possible.  Submit refill requests through [employer] or call your pharmacy and they will forward the refill request to us. Please allow 3 business days for your  refill to be completed.          Additional Information About Your Visit        [employer] Information     [employer] gives you secure access to your electronic health record. If you see a primary care provider, you can also send messages to your care team and make appointments. If you have questions, please call your primary care clinic.  If you do not have a primary care provider, please call [phone] and they will assist you.        Care EveryWhere ID     This is your Care EveryWhere ID. This could be used by other organizations to access your [facility] medical records  [id number]        Your Vitals Were     Respirations                   12            Blood Pressure from Last 3 Encounters:   08/21/17 (P) 128/78   03/01/17 138/78   02/13/17 168/82    Weight from Last 3 Encounters:   08/21/17 (P) 163 lb (73.9 kg)   02/13/17 184 lb 1.4 oz (83.5 kg)   10/31/16 168 lb (76.2 kg)              We Performed the Following     Comprehensive metabolic panel     TSH with free T4 reflex     UA reflex to Microscopic     Urine Culture Aerobic Bacterial     Urine Microscopic          Today's Medication Changes          These changes are accurate as of: 8/21/17  2:14 PM.  If you have any questions, ask your nurse or doctor.               Start taking these medicines.        Dose/Directions    ciprofloxacin 250 MG tablet   Commonly known as:  CIPRO   Used for:  Acute cystitis with hematuria   Started by:  Lacy Gutierrez APRN CNP        Dose:  250 mg   Take 1 tablet (250 mg) by mouth 2 times daily   Quantity:  10 tablet   Refills:  0            Where to get your medicines      These medications were sent to [facility] [address] [address]     Phone:  [phone]     ciprofloxacin 250 MG tablet         Some of these will need a paper prescription and others can be bought over the counter.  Ask your nurse if you have questions.     Bring a paper prescription for  each of these medications     HYDROcodone-acetaminophen 5-325 MG per tablet                Primary Care Provider Office Phone # Fax #    Tai Nick Kim [phone]       [address] [place] [address]        Equal Access to Services     JAMEL MARTÍNEZ : Hadcurtis farzaneh bee jimo Soomaali, waaxda luqadaha, qaybta kaalmada adeegyada, duane rose haycecilion low cuellar laWaynebalta fulton. So [facility] [phone].    ATENCIÓN: Si habla español, tiene a bennett disposición servicios gratuitos de asistencia lingüística. Llame al [phone].    We comply with applicable federal civil rights laws and [place] laws. We do not discriminate on the basis of race, color, national origin, age, disability sex, sexual orientation or gender identity.            Thank you!     Thank you for choosing [facility]  for your care. Our goal is always to provide you with excellent care. Hearing back from our patients is one way we can continue to improve our services. Please take a few minutes to complete the written survey that you may receive in the mail after your visit with us. Thank you!             Your Updated Medication List - Protect others around you: Learn how to safely use, store and throw away your medicines at www.disposemymeds.org.          This list is accurate as of: 8/21/17  2:14 PM.  Always use your most recent med list.                   Brand Name Dispense Instructions for use Diagnosis    ACTAMIN MAXIMUM STRENGTH OR      Take 500 mg by mouth every 6 hours. Every 4-6 hours for pain        aspirin 81 MG tablet     100 tablet    ONE DAILY    Essential hypertension with goal blood pressure less than 140/90       CALCIUM 500 + D 500-125 MG-UNIT Tabs   Generic drug:  Calcium Carbonate-Vitamin D      1 A DAY    Osteoporosis, unspecified       ciprofloxacin 250 MG tablet    CIPRO    10 tablet    Take 1 tablet (250 mg) by mouth 2 times daily    Acute cystitis with hematuria       diclofenac 50 MG EC  tablet    VOLTAREN    180 tablet    TAKE ONE TABLET BY MOUTH TWO TIMES A DAY    CKD (chronic kidney disease) stage 3, GFR 30-59 ml/min, Essential hypertension with goal blood pressure less than 140/90       donepezil 10 MG tablet    ARICEPT    90 tablet    TAKE ONE TABLET BY MOUTH EVERY NIGHT AT BEDTIME    Dementia       Fish Oil 1200 MG Caps      every day        FLUoxetine 20 MG capsule    PROzac    90 capsule    TAKE ONE CAPSULE BY MOUTH EVERY DAY    Major depressive disorder, recurrent episode, mild (H)       HYDROcodone-acetaminophen 5-325 MG per tablet    NORCO    60 tablet    TAKE 1 TABLET BY ORAL ROUTE EVERY 6 HOURS AS NEEDED FOR PAIN    Polymyalgia rheumatica (H)       hydrocortisone-pramoxine rectal foam    PROCTOFOAM HC    10 g    Place 1 applicator rectally 2 times daily    External hemorrhoids       levocetirizine 5 MG tablet    XYZAL    30 tablet    TAKE ONE TABLET BY MOUTH EVERY EVENING    Seasonal allergic rhinitis       levothyroxine 125 MCG tablet    SYNTHROID/LEVOTHROID    90 tablet    TAKE ONE TABLET BY MOUTH EVERY DAY    Hypothyroidism, unspecified type       lidocaine 5 % ointment    XYLOCAINE    50 g    Apply topically 2 times daily To affected areas.    Pain of left upper extremity       loperamide 2 MG tablet    IMODIUM A-D    30 tablet    Start with 2 tabs (4 mg), then take one tab (2 mg) after each diarrheal stool.  Do not use more than  8 tabs (16 mg) per day.    Diarrhea       metoprolol 25 MG tablet    LOPRESSOR    270 tablet    TAKE ONE TABLET BY MOUTH EVERY MORNING AND TAKE TWO TABLETS EVERY EVENING    Hypertension goal BP (blood pressure) < 140/90       Multi-vitamin Tabs tablet   Generic drug:  multivitamin, therapeutic with minerals     120    1 tablet daily        mupirocin 2 % ointment    BACTROBAN    22 g    Apply topically 3 times daily    Cellulitis of face       order for DME     1 Device    Equipment being ordered: Wheelchair basic with foot rests.    Other osteoarthritis of  spine, lumbar region       QUEtiapine 25 MG tablet    SEROQUEL    225 tablet    take 1 and 1/2 tab daily if needed for restlessness/agitation and 1 at bedtime routinely    Dementia without behavioral disturbance, unspecified dementia type       ranitidine 300 MG tablet    ZANTAC    90 tablet    TAKE ONE TABLET BY MOUTH EVERY DAY    Gastroesophageal reflux disease with esophagitis

## 2017-08-22 LAB
ALBUMIN SERPL-MCNC: 3.5 G/DL (ref 3.4–5)
ALP SERPL-CCNC: 53 U/L (ref 40–150)
ALT SERPL W P-5'-P-CCNC: 15 U/L (ref 0–50)
ANION GAP SERPL CALCULATED.3IONS-SCNC: 8 MMOL/L (ref 3–14)
AST SERPL W P-5'-P-CCNC: 12 U/L (ref 0–45)
BILIRUB SERPL-MCNC: 0.2 MG/DL (ref 0.2–1.3)
BUN SERPL-MCNC: 18 MG/DL (ref 7–30)
CALCIUM SERPL-MCNC: 8.8 MG/DL (ref 8.5–10.1)
CHLORIDE SERPL-SCNC: 106 MMOL/L (ref 94–109)
CO2 SERPL-SCNC: 28 MMOL/L (ref 20–32)
CREAT SERPL-MCNC: 0.9 MG/DL (ref 0.52–1.04)
GFR SERPL CREATININE-BSD FRML MDRD: 59 ML/MIN/1.7M2
GLUCOSE SERPL-MCNC: 102 MG/DL (ref 70–99)
POTASSIUM SERPL-SCNC: 4.2 MMOL/L (ref 3.4–5.3)
PROT SERPL-MCNC: 6.9 G/DL (ref 6.8–8.8)
SODIUM SERPL-SCNC: 142 MMOL/L (ref 133–144)
T4 FREE SERPL-MCNC: 1.69 NG/DL (ref 0.76–1.46)
TSH SERPL DL<=0.005 MIU/L-ACNC: 0.03 MU/L (ref 0.4–4)

## 2017-08-23 ENCOUNTER — TELEPHONE (OUTPATIENT)
Dept: FAMILY MEDICINE | Facility: OTHER | Age: 82
End: 2017-08-23

## 2017-08-23 DIAGNOSIS — N30.00 ACUTE CYSTITIS WITHOUT HEMATURIA: Primary | ICD-10-CM

## 2017-08-23 LAB
BACTERIA SPEC CULT: ABNORMAL
SPECIMEN SOURCE: ABNORMAL

## 2017-08-23 RX ORDER — AMOXICILLIN AND CLAVULANATE POTASSIUM 500; 125 MG/1; MG/1
1 TABLET, FILM COATED ORAL 2 TIMES DAILY
Qty: 14 TABLET | Refills: 0 | Status: SHIPPED | OUTPATIENT
Start: 2017-08-23 | End: 2017-11-20 | Stop reason: SINTOL

## 2017-08-23 RX ORDER — NITROFURANTOIN 25; 75 MG/1; MG/1
100 CAPSULE ORAL 2 TIMES DAILY
Qty: 14 CAPSULE | Refills: 0 | Status: CANCELLED | OUTPATIENT
Start: 2017-08-23

## 2017-08-23 NOTE — TELEPHONE ENCOUNTER
Spoke with daughter. Informed her of the information from Lacy Gutierrez CNP. Daughter understands and agreed to the plan. In regards to the constipation daughter stated that as long as they stay on top of it patient will have a bowel movement everyday. Daughter stated they are giving patient Miralax every other day and Senna every day as well as pushing fluids.  Latonya Walsh RN

## 2017-08-23 NOTE — TELEPHONE ENCOUNTER
Please have RN call patient:    Please let patients daughter or CMA  know that patients urine culture showed EColi which unfortunately is resistant to Ciprofloxacin   ( The antibiotic that I placed her on Monday). I would like her to stop the Cipro medication as it will not completely treat the infection. I will be restarting her on Augmentin X7 days. She will take this twice daily. She can hold off on rechecking her urine unless her symptoms do not resolve. RX sent to [facility].     I also noticed yesterday we did not get to it but she had questions on constipation, if an RN could assess this and the need for an additional appointment.     Lacy Gutierrez, JOHN PAUL CNP

## 2017-08-24 ENCOUNTER — TELEPHONE (OUTPATIENT)
Dept: FAMILY MEDICINE | Facility: OTHER | Age: 82
End: 2017-08-24

## 2017-08-24 DIAGNOSIS — E03.9 HYPOTHYROIDISM, UNSPECIFIED TYPE: ICD-10-CM

## 2017-08-24 RX ORDER — LEVOTHYROXINE SODIUM 100 UG/1
100 TABLET ORAL DAILY
Qty: 90 TABLET | Refills: 0 | Status: SHIPPED | OUTPATIENT
Start: 2017-08-24 | End: 2017-11-27

## 2017-08-28 DIAGNOSIS — I10 ESSENTIAL HYPERTENSION WITH GOAL BLOOD PRESSURE LESS THAN 140/90: ICD-10-CM

## 2017-08-28 DIAGNOSIS — N18.30 CKD (CHRONIC KIDNEY DISEASE) STAGE 3, GFR 30-59 ML/MIN (H): ICD-10-CM

## 2017-08-29 NOTE — TELEPHONE ENCOUNTER
Diclofenac      Last Written Prescription Date: 5/19/2017  Last Quantity: 180, # refills: 0  Last Office Visit with [facility], [facility] or [facility] prescribing provider: 8/21/2017       Creatinine   Date Value Ref Range Status   08/21/2017 0.90 0.52 - 1.04 mg/dL Final     Lab Results   Component Value Date    AST 12 08/21/2017     Lab Results   Component Value Date    ALT 15 08/21/2017     BP Readings from Last 3 Encounters:   08/21/17 (P) 128/78   03/01/17 138/78   02/13/17 168/82

## 2017-08-30 NOTE — TELEPHONE ENCOUNTER
Diclofenac  Routing refill request to provider for review/approval because:  Drug not on the [facility] refill protocol for associated diagnosis    Fito Tee RN, BSN

## 2017-09-12 DIAGNOSIS — M35.3 POLYMYALGIA RHEUMATICA (H): ICD-10-CM

## 2017-09-12 NOTE — TELEPHONE ENCOUNTER
Norco      Last Written Prescription Date: 8/21/17  Last Fill Quantity: 60,  # refills: 0   Last Office Visit with G, P or [facility] prescribing provider: 8/21/17

## 2017-09-13 DIAGNOSIS — R35.0 URINARY FREQUENCY: Primary | ICD-10-CM

## 2017-09-13 RX ORDER — HYDROCODONE BITARTRATE AND ACETAMINOPHEN 5; 325 MG/1; MG/1
TABLET ORAL
Qty: 60 TABLET | Refills: 0 | Status: SHIPPED | OUTPATIENT
Start: 2017-09-13 | End: 2017-09-25

## 2017-09-13 NOTE — TELEPHONE ENCOUNTER
Reason for Call: Request for an order or referral:    Order or referral being requested: labs for UTI     Date needed: as soon as possible    Has the patient been seen by the PCP for this problem? YES    Additional comments: pt had UTI and was treated 2 wks ago, symptoms are back like confusion are back, which happens when she normally has a bladder infection.  Daughter states it is hard to bring patient in to the clini.  Would like to know if someone can put in orders for her so that she can bring in a sample.  Please call her back.    Phone number Patient can be reached at:  Home #     Best Time:  Anytime     Can we leave a detailed message on this number?  YES    Call taken on 9/13/2017 at 3:59 PM by Erica Angeles

## 2017-09-14 NOTE — TELEPHONE ENCOUNTER
Per Dr. Mendez- he will send her in some Keflex. Daughter notified. Rx called in. Shalonda PROCTOR

## 2017-09-14 NOTE — TELEPHONE ENCOUNTER
Telephone call attempted to daughter, Daphne, no answer.  Left message for her to call back.    Fito Tee RN, BSN

## 2017-09-14 NOTE — TELEPHONE ENCOUNTER
Daphne is returning call and reporting patient had a URI on 8/21/17.  Patient started on Cipro, but then they were called and informed the Cipro was switched to Augmentin.  Daphne states the Augmentin made patient very sick.  Patient was disoriented and sleeping all day.  Daphne decided this was due to the Augmentin, so stopped this after 2 days and went back to the Cipro.  Daphne is now reporting patient has the same symptoms as she did before, which include confusion, irritability, and dark urine.      Daphne is informed that the reason the medication was switched was because the Cipro would not cover the bacteria she had, so yes, she probably still has the original UTI.  Daphne states she cannot give Augmentin to patient as she had such bad side effects from it.  She would like to add this to her allergy list.  Daphne states she collected urine today in a sterile cup and is wondering if she can just bring it in.  She is informed PCP is out of office and we will have to see if there is another provider that will address this.  She is informed this may need an Evisit via [employer], but that we would get back to her to let her know the plan.  Daughter states patient is very non-ambulatory and it is extremely difficult to get patient in for an OV.  Routing to covering provider for further advice.    Shelia Walton RN

## 2017-09-15 RX ORDER — CEPHALEXIN 500 MG/1
500 CAPSULE ORAL 2 TIMES DAILY
Qty: 20 CAPSULE | Refills: 0 | COMMUNITY
Start: 2017-09-15 | End: 2017-12-28

## 2017-09-25 DIAGNOSIS — M35.3 POLYMYALGIA RHEUMATICA (H): ICD-10-CM

## 2017-09-25 RX ORDER — HYDROCODONE BITARTRATE AND ACETAMINOPHEN 5; 325 MG/1; MG/1
TABLET ORAL
Qty: 60 TABLET | Refills: 0 | Status: SHIPPED | OUTPATIENT
Start: 2017-09-25 | End: 2017-10-16

## 2017-09-25 NOTE — TELEPHONE ENCOUNTER
norco      Last Written Prescription Date: 09/13/2017  Last Fill Quantity: 60,  # refills: 0   Last Office Visit with [facility], [facility] or  Health prescribing provider: 08/21/2017    Thank You,  Godwin Butcher, Pharmacy [facility] [place]

## 2017-10-02 DIAGNOSIS — J30.2 SEASONAL ALLERGIC RHINITIS: ICD-10-CM

## 2017-10-02 DIAGNOSIS — F33.0 MAJOR DEPRESSIVE DISORDER, RECURRENT EPISODE, MILD (H): ICD-10-CM

## 2017-10-02 NOTE — TELEPHONE ENCOUNTER
levocetirizine (XYZAL) 5 MG tablet      Last Written Prescription Date: 9/30/16  Last Fill Quantity: 30,  # refills: 11   Last Office Visit with [facility], [facility] or [facility] prescribing provider: 8/21/17

## 2017-10-02 NOTE — TELEPHONE ENCOUNTER
FLUoxetine (PROZAC) 20 MG capsule  Last Written Prescription Date: 10/04/2016  Last Fill Quantity: 90, # refills: 3  Last Office Visit with [facility] primary care provider:  08/21/2017        Last PHQ-9 score on record=   PHQ-9 SCORE 3/1/2017   Total Score -   Total Score 6

## 2017-10-03 NOTE — TELEPHONE ENCOUNTER
Routing refill request to provider for review/approval because:  PHQ 9 completed on 03/01/2017, score of 6.     Preeti Shields RN

## 2017-10-04 RX ORDER — LEVOCETIRIZINE DIHYDROCHLORIDE 5 MG/1
TABLET, FILM COATED ORAL
Qty: 30 TABLET | Refills: 9 | Status: SHIPPED | OUTPATIENT
Start: 2017-10-04 | End: 2019-04-19

## 2017-10-04 NOTE — TELEPHONE ENCOUNTER
Xyzal  Prescription approved per [facility] [place] Refill Protocol.    Fito Tee RN, BSN

## 2017-10-09 DIAGNOSIS — I10 HYPERTENSION GOAL BP (BLOOD PRESSURE) < 140/90: ICD-10-CM

## 2017-10-09 NOTE — TELEPHONE ENCOUNTER
metoprolol (LOPRESSOR) 25 MG tablet      Last Written Prescription Date: 4/10/17  Last Fill Quantity: 270, # refills: 1    Last Office Visit with G, P or [facility] prescribing provider:  8/21/17   Future Office Visit:        BP Readings from Last 3 Encounters:   08/21/17 (P) 128/78   03/01/17 138/78   02/13/17 168/82

## 2017-10-12 RX ORDER — METOPROLOL TARTRATE 25 MG/1
TABLET, FILM COATED ORAL
Qty: 270 TABLET | Refills: 2 | Status: SHIPPED | OUTPATIENT
Start: 2017-10-12 | End: 2018-07-03

## 2017-10-16 DIAGNOSIS — M35.3 POLYMYALGIA RHEUMATICA (H): ICD-10-CM

## 2017-10-16 RX ORDER — HYDROCODONE BITARTRATE AND ACETAMINOPHEN 5; 325 MG/1; MG/1
TABLET ORAL
Qty: 60 TABLET | Refills: 0 | Status: SHIPPED | OUTPATIENT
Start: 2017-10-16 | End: 2017-11-06

## 2017-10-16 NOTE — TELEPHONE ENCOUNTER
nikco      Last Written Prescription Date: 09/25/2017  Last Fill Quantity: 60,  # refills: 0   Last Office Visit with [facility], [facility] or  [facility] prescribing provider: 03/01/2017      Thank You,  Godwin Butcher, Pharmacy [facility] [place]

## 2017-11-06 DIAGNOSIS — M35.3 POLYMYALGIA RHEUMATICA (H): ICD-10-CM

## 2017-11-06 RX ORDER — HYDROCODONE BITARTRATE AND ACETAMINOPHEN 5; 325 MG/1; MG/1
TABLET ORAL
Qty: 60 TABLET | Refills: 0 | Status: SHIPPED | OUTPATIENT
Start: 2017-11-06 | End: 2017-11-27

## 2017-11-10 DIAGNOSIS — M35.3 POLYMYALGIA RHEUMATICA (H): ICD-10-CM

## 2017-11-20 ENCOUNTER — TELEPHONE (OUTPATIENT)
Dept: FAMILY MEDICINE | Facility: CLINIC | Age: 82
End: 2017-11-20

## 2017-11-20 DIAGNOSIS — R35.0 URINARY FREQUENCY: ICD-10-CM

## 2017-11-20 DIAGNOSIS — R35.0 URINARY FREQUENCY: Primary | ICD-10-CM

## 2017-11-20 DIAGNOSIS — N30.00 ACUTE CYSTITIS WITHOUT HEMATURIA: Primary | ICD-10-CM

## 2017-11-20 LAB
ALBUMIN UR-MCNC: NEGATIVE MG/DL
APPEARANCE UR: ABNORMAL
BACTERIA #/AREA URNS HPF: ABNORMAL /HPF
BILIRUB UR QL STRIP: NEGATIVE
COLOR UR AUTO: YELLOW
GLUCOSE UR STRIP-MCNC: NEGATIVE MG/DL
HGB UR QL STRIP: NEGATIVE
KETONES UR STRIP-MCNC: NEGATIVE MG/DL
LEUKOCYTE ESTERASE UR QL STRIP: ABNORMAL
MUCOUS THREADS #/AREA URNS LPF: PRESENT /LPF
NITRATE UR QL: NEGATIVE
PH UR STRIP: 5 PH (ref 5–7)
RBC #/AREA URNS AUTO: 41 /HPF (ref 0–2)
SOURCE: ABNORMAL
SP GR UR STRIP: 1.02 (ref 1–1.03)
SQUAMOUS #/AREA URNS AUTO: 1 /HPF (ref 0–1)
UROBILINOGEN UR STRIP-MCNC: 0 MG/DL (ref 0–2)
WBC #/AREA URNS AUTO: 34 /HPF (ref 0–2)

## 2017-11-20 PROCEDURE — 87186 SC STD MICRODIL/AGAR DIL: CPT | Performed by: FAMILY MEDICINE

## 2017-11-20 PROCEDURE — 87088 URINE BACTERIA CULTURE: CPT | Performed by: FAMILY MEDICINE

## 2017-11-20 PROCEDURE — 81001 URINALYSIS AUTO W/SCOPE: CPT | Performed by: FAMILY MEDICINE

## 2017-11-20 PROCEDURE — 87086 URINE CULTURE/COLONY COUNT: CPT | Performed by: FAMILY MEDICINE

## 2017-11-20 RX ORDER — AMOXICILLIN 500 MG/1
500 CAPSULE ORAL 2 TIMES DAILY
Qty: 14 CAPSULE | Refills: 0 | Status: SHIPPED | OUTPATIENT
Start: 2017-11-20 | End: 2017-12-28

## 2017-11-20 NOTE — TELEPHONE ENCOUNTER
Reason for Call:  Same Day Appointment, Requested Provider:  Tai Kim M.D.    PCP: Tai Kim    Reason for visit: patients daughter asking for a same day appt or an order for a UA.  Chiquita states patient has been having bladder infections more recently.  Symptoms of confusion, pink urine color, & strong concentration, please advise    Duration of symptoms:     Have you been treated for this in the past? Yes     Additional comments:     Can we leave a detailed message on this number? YES    Phone number patient can be reached at: Other phone number:  [phone]    Best Time:     Call taken on 11/20/2017 at 11:51 AM by Alejandra Glez

## 2017-11-20 NOTE — TELEPHONE ENCOUNTER
Daphne informed order for urine placed and to try to obtain specimen early this pm//Marcia Shay/[place]([facility])

## 2017-11-20 NOTE — TELEPHONE ENCOUNTER
Antibiotics were ordered and although she had an adverse reaction to Augmentin, I'm trying amoxicillin. If this is a problem for patient or her family and they do not want this, Cipro 250 mg twice daily for 5 days should be ordered as an alternative. In OLAF Kim M.D.

## 2017-11-20 NOTE — TELEPHONE ENCOUNTER
Msg left on designated answering machine for Daphne informing her that her mother's urine does look suspicious and Amoxicillin was sent to the [facility] [address]. Tai Kim/Marcia Shay CMA ([facility])

## 2017-11-22 NOTE — TELEPHONE ENCOUNTER
Patient's sister informed of message below and if symptoms persist to let us know [facility] Dr. Tai Kim/Marcia Shay CMA ([facility])

## 2017-11-23 LAB
BACTERIA SPEC CULT: ABNORMAL
BACTERIA SPEC CULT: ABNORMAL
Lab: ABNORMAL
SPECIMEN SOURCE: ABNORMAL

## 2017-11-27 DIAGNOSIS — M35.3 POLYMYALGIA RHEUMATICA (H): ICD-10-CM

## 2017-11-27 DIAGNOSIS — E03.9 HYPOTHYROIDISM, UNSPECIFIED TYPE: ICD-10-CM

## 2017-11-28 NOTE — TELEPHONE ENCOUNTER
Synthroid:  Routing refill request to provider for review/approval because:  Labs out of range:  TSH    Tamera Denton, RN, BSN

## 2017-11-29 RX ORDER — LEVOTHYROXINE SODIUM 100 UG/1
TABLET ORAL
Qty: 90 TABLET | Refills: 0 | Status: SHIPPED | OUTPATIENT
Start: 2017-11-29 | End: 2018-02-05

## 2017-11-29 RX ORDER — HYDROCODONE BITARTRATE AND ACETAMINOPHEN 5; 325 MG/1; MG/1
TABLET ORAL
Qty: 60 TABLET | Refills: 0 | Status: SHIPPED | OUTPATIENT
Start: 2017-11-29 | End: 2017-12-11

## 2017-12-04 DIAGNOSIS — K21.00 GASTROESOPHAGEAL REFLUX DISEASE WITH ESOPHAGITIS: ICD-10-CM

## 2017-12-06 NOTE — TELEPHONE ENCOUNTER
Prescription approved per [facility] Refill Protocol.  Katie Angeles RN

## 2017-12-09 DIAGNOSIS — F03.90 DEMENTIA WITHOUT BEHAVIORAL DISTURBANCE, UNSPECIFIED DEMENTIA TYPE: ICD-10-CM

## 2017-12-11 DIAGNOSIS — M35.3 POLYMYALGIA RHEUMATICA (H): ICD-10-CM

## 2017-12-11 NOTE — TELEPHONE ENCOUNTER
Requested Prescriptions   Pending Prescriptions Disp Refills     QUEtiapine (SEROQUEL) 25 MG tablet [Pharmacy Med Name: QUETIAPINE FUMARATE 25MG TABS] 225 tablet 1     Sig: TAKE ONE TABLET BY MOUTH AT BEDTIME AND TAKE 1 AND 1/2 TABLETS DAILY AS NEEDED FOR RESTLESSNESS/AGITATION    Antipsychotic Medications Failed    12/9/2017 11:32 AM       Failed - Lipid panel on file within the past 12 months    Recent Labs   Lab Test  10/27/15   0953   CHOL  238*   TRIG  293*   HDL  55   LDL  124   VLDL  59*   CHOLHDLRATIO  4.3              Failed - Recent or future visit with authorizing provider's specialty    Patient had office visit in the last 6 months or has a visit in the next 30 days with authorizing provider.  See chart review.            Passed - BP is less then 140/90    BP Readings from Last 3 Encounters:   08/21/17 (P) 128/78   03/01/17 138/78   02/13/17 168/82                Passed - Patient is 12 years of age or older       Passed - CBC on file in past 12 months    Recent Labs   Lab Test  02/13/17   0540  02/12/17   0540   WBC   --   6.6   RBC   --   3.29*   HGB  10.2*  9.8*   HCT   --   30.9*   PLT   --   199            Passed - Heart Rate on file within past 12 months    Data Unavailable           Passed - A1c or Glucose on file in past 12 months    Recent Labs   Lab Test  08/21/17   1415   GLC  102*       Please review patients last 3 weights. If a weight gain of >10 lbs exists, you may refill the prescription once after instructing the patient to schedule an appointment within the next 30 days.    Wt Readings from Last 3 Encounters:   08/21/17 (P) 163 lb (73.9 kg)   02/13/17 184 lb 1.4 oz (83.5 kg)   10/31/16 168 lb (76.2 kg)            Passed - Patient is not pregnant       Passed - No positve pregnancy test on file in past 12 months

## 2017-12-12 NOTE — TELEPHONE ENCOUNTER
Routing refill request to provider for review/approval because:  Labs out of range:  See below.  Katie Angeles RN    Requested Prescriptions   Pending Prescriptions Disp Refills     QUEtiapine (SEROQUEL) 25 MG tablet [Pharmacy Med Name: QUETIAPINE FUMARATE 25MG TABS] 225 tablet 1     Sig: TAKE ONE TABLET BY MOUTH AT BEDTIME AND TAKE 1 AND 1/2 TABLETS DAILY AS NEEDED FOR RESTLESSNESS/AGITATION    Antipsychotic Medications Failed    12/11/2017  7:59 AM       Failed - Lipid panel on file within the past 12 months    Recent Labs   Lab Test  10/27/15   0953   CHOL  238*   TRIG  293*   HDL  55   LDL  124   VLDL  59*   CHOLHDLRATIO  4.3              Failed - Recent or future visit with authorizing provider's specialty    Patient had office visit in the last 6 months or has a visit in the next 30 days with authorizing provider.  See chart review.            Passed - BP is less then 140/90    BP Readings from Last 3 Encounters:   08/21/17 (P) 128/78   03/01/17 138/78   02/13/17 168/82                Passed - Patient is 12 years of age or older       Passed - CBC on file in past 12 months    Recent Labs   Lab Test  02/13/17   0540  02/12/17   0540   WBC   --   6.6   RBC   --   3.29*   HGB  10.2*  9.8*   HCT   --   30.9*   PLT   --   199            Passed - Heart Rate on file within past 12 months    Data Unavailable           Passed - A1c or Glucose on file in past 12 months    Recent Labs   Lab Test  08/21/17   1415   GLC  102*       Please review patients last 3 weights. If a weight gain of >10 lbs exists, you may refill the prescription once after instructing the patient to schedule an appointment within the next 30 days.    Wt Readings from Last 3 Encounters:   08/21/17 (P) 163 lb (73.9 kg)   02/13/17 184 lb 1.4 oz (83.5 kg)   10/31/16 168 lb (76.2 kg)            Passed - Patient is not pregnant       Passed - No positve pregnancy test on file in past 12 months

## 2017-12-13 RX ORDER — QUETIAPINE FUMARATE 25 MG/1
TABLET, FILM COATED ORAL
Qty: 225 TABLET | Refills: 1 | Status: SHIPPED | OUTPATIENT
Start: 2017-12-13 | End: 2018-11-19

## 2017-12-13 RX ORDER — HYDROCODONE BITARTRATE AND ACETAMINOPHEN 5; 325 MG/1; MG/1
TABLET ORAL
Qty: 60 TABLET | Refills: 0 | Status: SHIPPED | OUTPATIENT
Start: 2017-12-13 | End: 2018-01-02

## 2017-12-27 ENCOUNTER — MYC MEDICAL ADVICE (OUTPATIENT)
Dept: FAMILY MEDICINE | Facility: CLINIC | Age: 82
End: 2017-12-27

## 2017-12-27 DIAGNOSIS — R30.0 DYSURIA: Primary | ICD-10-CM

## 2017-12-28 ENCOUNTER — E-VISIT (OUTPATIENT)
Dept: FAMILY MEDICINE | Facility: CLINIC | Age: 82
End: 2017-12-28

## 2017-12-28 DIAGNOSIS — N30.00 ACUTE CYSTITIS WITHOUT HEMATURIA: Primary | ICD-10-CM

## 2017-12-28 DIAGNOSIS — N39.0 URINARY TRACT INFECTION WITHOUT HEMATURIA, SITE UNSPECIFIED: Primary | ICD-10-CM

## 2017-12-28 DIAGNOSIS — R30.0 DYSURIA: ICD-10-CM

## 2017-12-28 LAB
ALBUMIN UR-MCNC: NEGATIVE MG/DL
APPEARANCE UR: CLEAR
BACTERIA #/AREA URNS HPF: ABNORMAL /HPF
BILIRUB UR QL STRIP: NEGATIVE
COLOR UR AUTO: ABNORMAL
GLUCOSE UR STRIP-MCNC: NEGATIVE MG/DL
HGB UR QL STRIP: ABNORMAL
KETONES UR STRIP-MCNC: NEGATIVE MG/DL
LEUKOCYTE ESTERASE UR QL STRIP: ABNORMAL
MUCOUS THREADS #/AREA URNS LPF: PRESENT /LPF
NITRATE UR QL: NEGATIVE
PH UR STRIP: 6 PH (ref 5–7)
RBC #/AREA URNS AUTO: 2 /HPF (ref 0–2)
SOURCE: ABNORMAL
SP GR UR STRIP: 1.01 (ref 1–1.03)
SQUAMOUS #/AREA URNS AUTO: 1 /HPF (ref 0–1)
UROBILINOGEN UR STRIP-MCNC: 0 MG/DL (ref 0–2)
WBC #/AREA URNS AUTO: 9 /HPF (ref 0–2)

## 2017-12-28 PROCEDURE — 99207 ZZC NO BILLABLE SERVICE THIS VISIT: CPT | Performed by: FAMILY MEDICINE

## 2017-12-28 PROCEDURE — 81001 URINALYSIS AUTO W/SCOPE: CPT | Performed by: NURSE PRACTITIONER

## 2017-12-28 RX ORDER — CIPROFLOXACIN 250 MG/1
250 TABLET, FILM COATED ORAL 2 TIMES DAILY
Qty: 6 TABLET | Refills: 0 | Status: CANCELLED | OUTPATIENT
Start: 2017-12-28

## 2017-12-28 RX ORDER — CEPHALEXIN 500 MG/1
500 CAPSULE ORAL 2 TIMES DAILY
Qty: 14 CAPSULE | Refills: 0 | Status: SHIPPED | OUTPATIENT
Start: 2017-12-28 | End: 2018-01-04

## 2017-12-28 NOTE — TELEPHONE ENCOUNTER
Patient will need to be seen for a possible UTI as Dr. Kim is not in on Thursdays.  Please call daughter Julissa.

## 2017-12-28 NOTE — TELEPHONE ENCOUNTER
Yes they can bring in a urine sample, and I will address the results.  As far as ordering any medical equipment for home, I will defer that conversation to her primary care provider.  They can discuss with Dr. Kim when he is in clinic

## 2018-01-02 DIAGNOSIS — M35.3 POLYMYALGIA RHEUMATICA (H): ICD-10-CM

## 2018-01-03 RX ORDER — HYDROCODONE BITARTRATE AND ACETAMINOPHEN 5; 325 MG/1; MG/1
TABLET ORAL
Qty: 60 TABLET | Refills: 0 | Status: SHIPPED | OUTPATIENT
Start: 2018-01-03 | End: 2018-01-20

## 2018-01-03 NOTE — TELEPHONE ENCOUNTER
hydrocodone      Last Written Prescription Date:  12/13/17  Last Fill Quantity: 60,   # refills: 0  Last Office Visit: 08/21/17  Future Office visit:

## 2018-01-15 ENCOUNTER — APPOINTMENT (OUTPATIENT)
Dept: GENERAL RADIOLOGY | Facility: CLINIC | Age: 83
End: 2018-01-15
Attending: FAMILY MEDICINE
Payer: MEDICARE

## 2018-01-15 ENCOUNTER — TELEPHONE (OUTPATIENT)
Dept: FAMILY MEDICINE | Facility: CLINIC | Age: 83
End: 2018-01-15

## 2018-01-15 ENCOUNTER — HOSPITAL ENCOUNTER (EMERGENCY)
Facility: CLINIC | Age: 83
Discharge: HOME | End: 2018-01-16
Attending: FAMILY MEDICINE | Admitting: FAMILY MEDICINE
Payer: MEDICARE

## 2018-01-15 DIAGNOSIS — N30.00 ACUTE CYSTITIS WITHOUT HEMATURIA: ICD-10-CM

## 2018-01-15 LAB
ALBUMIN SERPL-MCNC: 3.5 G/DL (ref 3.4–5)
ALBUMIN UR-MCNC: NEGATIVE MG/DL
ALP SERPL-CCNC: 55 U/L (ref 40–150)
ALT SERPL W P-5'-P-CCNC: 16 U/L (ref 0–50)
ANION GAP SERPL CALCULATED.3IONS-SCNC: 8 MMOL/L (ref 3–14)
APPEARANCE UR: CLEAR
AST SERPL W P-5'-P-CCNC: 17 U/L (ref 0–45)
BACTERIA #/AREA URNS HPF: ABNORMAL /HPF
BASE EXCESS BLDV CALC-SCNC: 1.6 MMOL/L
BASOPHILS # BLD AUTO: 0 10E9/L (ref 0–0.2)
BASOPHILS NFR BLD AUTO: 0.2 %
BILIRUB SERPL-MCNC: 0.2 MG/DL (ref 0.2–1.3)
BILIRUB UR QL STRIP: NEGATIVE
BUN SERPL-MCNC: 19 MG/DL (ref 7–30)
CALCIUM SERPL-MCNC: 8.8 MG/DL (ref 8.5–10.1)
CHLORIDE SERPL-SCNC: 106 MMOL/L (ref 94–109)
CO2 SERPL-SCNC: 27 MMOL/L (ref 20–32)
COLOR UR AUTO: YELLOW
CREAT SERPL-MCNC: 0.79 MG/DL (ref 0.52–1.04)
DIFFERENTIAL METHOD BLD: ABNORMAL
EOSINOPHIL # BLD AUTO: 0.4 10E9/L (ref 0–0.7)
EOSINOPHIL NFR BLD AUTO: 4.5 %
ERYTHROCYTE [DISTWIDTH] IN BLOOD BY AUTOMATED COUNT: 13.3 % (ref 10–15)
FLUAV+FLUBV AG SPEC QL: NEGATIVE
FLUAV+FLUBV AG SPEC QL: NEGATIVE
GFR SERPL CREATININE-BSD FRML MDRD: 69 ML/MIN/1.7M2
GLUCOSE SERPL-MCNC: 125 MG/DL (ref 70–99)
GLUCOSE UR STRIP-MCNC: NEGATIVE MG/DL
HCO3 BLDV-SCNC: 27 MMOL/L (ref 21–28)
HCT VFR BLD AUTO: 37.2 % (ref 35–47)
HGB BLD-MCNC: 11.6 G/DL (ref 11.7–15.7)
HGB UR QL STRIP: NEGATIVE
IMM GRANULOCYTES # BLD: 0 10E9/L (ref 0–0.4)
IMM GRANULOCYTES NFR BLD: 0.1 %
KETONES UR STRIP-MCNC: NEGATIVE MG/DL
LACTATE BLD-SCNC: 1.7 MMOL/L (ref 0.7–2)
LEUKOCYTE ESTERASE UR QL STRIP: ABNORMAL
LYMPHOCYTES # BLD AUTO: 2.5 10E9/L (ref 0.8–5.3)
LYMPHOCYTES NFR BLD AUTO: 27.7 %
MCH RBC QN AUTO: 28 PG (ref 26.5–33)
MCHC RBC AUTO-ENTMCNC: 31.2 G/DL (ref 31.5–36.5)
MCV RBC AUTO: 90 FL (ref 78–100)
MONOCYTES # BLD AUTO: 0.6 10E9/L (ref 0–1.3)
MONOCYTES NFR BLD AUTO: 6.9 %
MUCOUS THREADS #/AREA URNS LPF: PRESENT /LPF
NEUTROPHILS # BLD AUTO: 5.6 10E9/L (ref 1.6–8.3)
NEUTROPHILS NFR BLD AUTO: 60.6 %
NITRATE UR QL: POSITIVE
O2/TOTAL GAS SETTING VFR VENT: 21 %
PCO2 BLDV: 45 MM HG (ref 40–50)
PH BLDV: 7.39 PH (ref 7.32–7.43)
PH UR STRIP: 6 PH (ref 5–7)
PLATELET # BLD AUTO: 311 10E9/L (ref 150–450)
PO2 BLDV: 27 MM HG (ref 25–47)
POTASSIUM SERPL-SCNC: 3.9 MMOL/L (ref 3.4–5.3)
PROT SERPL-MCNC: 6.9 G/DL (ref 6.8–8.8)
RBC # BLD AUTO: 4.15 10E12/L (ref 3.8–5.2)
RBC #/AREA URNS AUTO: 1 /HPF (ref 0–2)
SODIUM SERPL-SCNC: 141 MMOL/L (ref 133–144)
SOURCE: ABNORMAL
SP GR UR STRIP: 1.01 (ref 1–1.03)
SPECIMEN SOURCE: NORMAL
TROPONIN I SERPL-MCNC: <0.015 UG/L (ref 0–0.04)
UROBILINOGEN UR STRIP-MCNC: 0 MG/DL (ref 0–2)
WBC # BLD AUTO: 9.2 10E9/L (ref 4–11)
WBC #/AREA URNS AUTO: 10 /HPF (ref 0–2)

## 2018-01-15 PROCEDURE — 87088 URINE BACTERIA CULTURE: CPT | Performed by: FAMILY MEDICINE

## 2018-01-15 PROCEDURE — 96365 THER/PROPH/DIAG IV INF INIT: CPT | Performed by: FAMILY MEDICINE

## 2018-01-15 PROCEDURE — 85025 COMPLETE CBC W/AUTO DIFF WBC: CPT | Performed by: FAMILY MEDICINE

## 2018-01-15 PROCEDURE — 84484 ASSAY OF TROPONIN QUANT: CPT | Performed by: FAMILY MEDICINE

## 2018-01-15 PROCEDURE — 71045 X-RAY EXAM CHEST 1 VIEW: CPT | Mod: TC

## 2018-01-15 PROCEDURE — 83605 ASSAY OF LACTIC ACID: CPT | Performed by: FAMILY MEDICINE

## 2018-01-15 PROCEDURE — 87086 URINE CULTURE/COLONY COUNT: CPT | Performed by: FAMILY MEDICINE

## 2018-01-15 PROCEDURE — 80053 COMPREHEN METABOLIC PANEL: CPT | Performed by: FAMILY MEDICINE

## 2018-01-15 PROCEDURE — 96361 HYDRATE IV INFUSION ADD-ON: CPT | Performed by: FAMILY MEDICINE

## 2018-01-15 PROCEDURE — 99284 EMERGENCY DEPT VISIT MOD MDM: CPT | Mod: Z6 | Performed by: FAMILY MEDICINE

## 2018-01-15 PROCEDURE — 87804 INFLUENZA ASSAY W/OPTIC: CPT | Performed by: FAMILY MEDICINE

## 2018-01-15 PROCEDURE — 87186 SC STD MICRODIL/AGAR DIL: CPT | Performed by: FAMILY MEDICINE

## 2018-01-15 PROCEDURE — 99284 EMERGENCY DEPT VISIT MOD MDM: CPT | Mod: 25 | Performed by: FAMILY MEDICINE

## 2018-01-15 PROCEDURE — 81001 URINALYSIS AUTO W/SCOPE: CPT | Performed by: FAMILY MEDICINE

## 2018-01-15 PROCEDURE — 25000128 H RX IP 250 OP 636: Performed by: FAMILY MEDICINE

## 2018-01-15 PROCEDURE — 82803 BLOOD GASES ANY COMBINATION: CPT | Performed by: FAMILY MEDICINE

## 2018-01-15 RX ORDER — LIDOCAINE 40 MG/G
CREAM TOPICAL
Status: DISCONTINUED | OUTPATIENT
Start: 2018-01-15 | End: 2018-01-16 | Stop reason: HOSPADM

## 2018-01-15 RX ORDER — LEVOFLOXACIN 500 MG/1
500 TABLET, FILM COATED ORAL DAILY
Qty: 6 TABLET | Refills: 0 | Status: SHIPPED | OUTPATIENT
Start: 2018-01-16 | End: 2018-01-19 | Stop reason: ALTCHOICE

## 2018-01-15 RX ORDER — LEVOFLOXACIN 5 MG/ML
500 INJECTION, SOLUTION INTRAVENOUS ONCE
Status: COMPLETED | OUTPATIENT
Start: 2018-01-15 | End: 2018-01-15

## 2018-01-15 RX ORDER — SODIUM CHLORIDE 9 MG/ML
1000 INJECTION, SOLUTION INTRAVENOUS CONTINUOUS
Status: DISCONTINUED | OUTPATIENT
Start: 2018-01-15 | End: 2018-01-16 | Stop reason: HOSPADM

## 2018-01-15 RX ADMIN — SODIUM CHLORIDE 1000 ML: 9 INJECTION, SOLUTION INTRAVENOUS at 20:05

## 2018-01-15 RX ADMIN — LEVOFLOXACIN 500 MG: 5 INJECTION, SOLUTION INTRAVENOUS at 21:29

## 2018-01-15 NOTE — ED AVS SNAPSHOT
[facility] Emergency Department    [address] [place] [address]    Phone:  [phone]    Fax:  [phone]                                       Anaid Dockery   MRN: [id number]    Department:  [facility] Emergency Department   Date of Visit:  1/15/2018           Patient Information     Date Of Birth          2/1/1930        Your diagnoses for this visit were:     Acute cystitis without hematuria        You were seen by Franco Vega MD.      Follow-up Information     Follow up with Tai Kim MD In 2 weeks.    Specialty:  Family Practice    Contact information:    [address] [place] [id number] [phone]          Discharge Instructions       Take the Levaquin daily for the next 6 days.  Recheck in clinic with Dr. Kim in 2 weeks.  Return to the ED if worse/concerns.  Encourage fluids.  Diet act as tolerated.  It was nice visiting with both of you this evening.  I hope you feel better soon.    Thank you for choosing [facility]. We appreciate the opportunity to meet your urgent medical needs. Please let us know if we could have done anything to make your stay more satisfying.    After discharge, please closely monitor for any new or worsening symptoms. Return to the Emergency Department if you develop any acute worsening signs or symptoms.    If you had lab work, cultures or imaging studies done during your stay, the final results may still be pending. We will call you if your plan of care needs to change. However, if you are not improving as expected, please follow up with your primary care provider or clinic.     Start any prescription medications that were prescribed to you and take them as directed.     Please see additional handouts that may be pertinent to your condition.        24 Hour Appointment Hotline       To make an appointment at any [facility], call [phone] ([phone]). If you don't have a family  doctor or clinic, we will help you find one. [place] clinics are conveniently located to serve the needs of you and your family.             Review of your medicines      START taking        Dose / Directions Last dose taken    levofloxacin 500 MG tablet   Commonly known as:  LEVAQUIN   Dose:  500 mg   Quantity:  6 tablet   Start taking on:  1/16/2018        Take 1 tablet (500 mg) by mouth daily for 6 days   Refills:  0          Our records show that you are taking the medicines listed below. If these are incorrect, please call your family doctor or clinic.        Dose / Directions Last dose taken    ACTAMIN MAXIMUM STRENGTH OR   Dose:  500 mg        Take 500 mg by mouth every 6 hours. Every 4-6 hours for pain   Refills:  0        aspirin 81 MG tablet   Quantity:  100 tablet        ONE DAILY   Refills:  3        CALCIUM 500 + D 500-125 MG-UNIT Tabs   Generic drug:  Calcium Carbonate-Vitamin D        1 A DAY   Refills:  0        diclofenac 50 MG EC tablet   Commonly known as:  VOLTAREN   Quantity:  180 tablet        TAKE ONE TABLET BY MOUTH TWICE A DAY   Refills:  2        donepezil 10 MG tablet   Commonly known as:  ARICEPT   Quantity:  90 tablet        TAKE ONE TABLET BY MOUTH EVERY NIGHT AT BEDTIME   Refills:  2        fish Oil 1200 MG capsule        every day   Refills:  0        FLUoxetine 20 MG capsule   Commonly known as:  PROzac   Quantity:  90 capsule        TAKE ONE CAPSULE BY MOUTH EVERY DAY   Refills:  3        HYDROcodone-acetaminophen 5-325 MG per tablet   Commonly known as:  NORCO   Quantity:  60 tablet        TAKE 1 TABLET BY ORAL ROUTE EVERY 6 HOURS AS NEEDED FOR PAIN   Refills:  0        hydrocortisone-pramoxine rectal foam   Commonly known as:  PROCTOFOAM HC   Dose:  1 applicator   Quantity:  10 g        Place 1 applicator rectally 2 times daily   Refills:  2        levocetirizine 5 MG tablet   Commonly known as:  XYZAL   Quantity:  30 tablet        TAKE ONE TABLET BY MOUTH EVERY EVENING    Refills:  9        levothyroxine 100 MCG tablet   Commonly known as:  SYNTHROID/LEVOTHROID   Quantity:  90 tablet        TAKE ONE TABLET BY MOUTH EVERY DAY   Refills:  0        lidocaine 5 % ointment   Commonly known as:  XYLOCAINE   Quantity:  50 g        Apply topically 2 times daily To affected areas.   Refills:  1        loperamide 2 MG tablet   Commonly known as:  IMODIUM A-D   Quantity:  30 tablet        Start with 2 tabs (4 mg), then take one tab (2 mg) after each diarrheal stool.  Do not use more than  8 tabs (16 mg) per day.   Refills:  0        metoprolol tartrate 25 MG tablet   Commonly known as:  LOPRESSOR   Quantity:  270 tablet        TAKE ONE TABLET BY MOUTH EVERY MORNING AND TAKE TWO TABLETS EVERY EVENING   Refills:  2        Multi-vitamin Tabs tablet   Quantity:  120   Generic drug:  multivitamin, therapeutic with minerals        1 tablet daily   Refills:  0        mupirocin 2 % ointment   Commonly known as:  BACTROBAN   Quantity:  22 g        Apply topically 3 times daily   Refills:  1        order for DME   Quantity:  1 Device        Equipment being ordered: Wheelchair basic with foot rests.   Refills:  0        QUEtiapine 25 MG tablet   Commonly known as:  SEROquel   Quantity:  225 tablet        TAKE ONE TABLET BY MOUTH AT BEDTIME AND TAKE 1 AND 1/2 TABLETS DAILY AS NEEDED FOR RESTLESSNESS/AGITATION   Refills:  1        ranitidine 300 MG tablet   Commonly known as:  ZANTAC   Quantity:  90 tablet        TAKE ONE TABLET BY MOUTH EVERY DAY   Refills:  2                Prescriptions were sent or printed at these locations (1 Prescription)                   [facility] [place] [address] [facility]    [address], [facility] [address]    Telephone:  [phone]   Fax:  [phone]   Hours:                  E-Prescribed (1 of 1)         levofloxacin (LEVAQUIN) 500 MG tablet                Procedures and tests performed during your visit     Blood gas venous    CBC with  platelets differential    Comprehensive metabolic panel    Influenza A/B antigen    Lactic acid whole blood    Peripheral IV catheter    Troponin I    UA with Microscopic    Urine Culture Aerobic Bacterial    XR Chest Port 1 View      Orders Needing Specimen Collection     None      Pending Results     Date and Time Order Name Status Description    1/15/2018 2115 Urine Culture Aerobic Bacterial In process             Pending Culture Results     Date and Time Order Name Status Description    1/15/2018 2115 Urine Culture Aerobic Bacterial In process             Pending Results Instructions     If you had any lab results that were not finalized at the time of your Discharge, you can call the ED Lab Result RN at [phone]. You will be contacted by this team for any positive Lab results or changes in treatment. The nurses are available 7 days a week from 10A to 6:30P.  You can leave a message 24 hours per day and they will return your call.        Thank you for choosing [facility]       Thank you for choosing [facility] for your care. Our goal is always to provide you with excellent care. Hearing back from our patients is one way we can continue to improve our services. Please take a few minutes to complete the written survey that you may receive in the mail after you visit with us. Thank you!        [facility] Information     [employer] gives you secure access to your electronic health record. If you see a primary care provider, you can also send messages to your care team and make appointments. If you have questions, please call your primary care clinic.  If you do not have a primary care provider, please call [phone] and they will assist you.        Care EveryWhere ID     This is your Care EveryWhere ID. This could be used by other organizations to access your [facility] medical records  [id number]        Equal Access to Services     JAMEL MARTÍNEZ AH: marita Murillo, mckenna sanabria  duane ambrosio ah. So [facility] [phone].    ATENCIÓN: Si habla español, tiene a bennett disposición servicios gratuitos de asistencia lingüística. Llame al [phone].    We comply with applicable federal civil rights laws and [place] laws. We do not discriminate on the basis of race, color, national origin, age, disability, sex, sexual orientation, or gender identity.            After Visit Summary       This is your record. Keep this with you and show to your community pharmacist(s) and doctor(s) at your next visit.

## 2018-01-15 NOTE — TELEPHONE ENCOUNTER
Mu for Daphne to return my call.  I need the name, phone and fax of the place that will supply this bed and why she needs it.

## 2018-01-15 NOTE — ED AVS SNAPSHOT
[facility] Emergency Department    [address] [place] [address]    Phone:  [phone]    Fax:  [phone]                                       Anaid Dockery   MRN: [id number]    Department:  [facility] Emergency Department   Date of Visit:  1/15/2018           After Visit Summary Signature Page     I have received my discharge instructions, and my questions have been answered. I have discussed any challenges I see with this plan with the nurse or doctor.    ..........................................................................................................................................  Patient/Patient Representative Signature      ..........................................................................................................................................  Patient Representative Print Name and Relationship to Patient    ..................................................               ................................................  Date                                            Time    ..........................................................................................................................................  Reviewed by Signature/Title    ...................................................              ..............................................  Date                                                            Time

## 2018-01-15 NOTE — TELEPHONE ENCOUNTER
Reason for Call: Request for an order    Order or referral being requested: Hospital bed rental    Date needed: as soon as possible    Has the patient been seen by the PCP for this problem? YES    Additional comments: Patients daughter calling and states that patient needs hospital bed because it will make it easier for the patient and her caregivers. Please advise.     Phone number Patient can be reached at:  Home number on file [phone] (home)    Best Time:  Any    Can we leave a detailed message on this number?  YES    Call taken on 1/15/2018 at 9:43 AM by Micki Khan

## 2018-01-16 VITALS
SYSTOLIC BLOOD PRESSURE: 150 MMHG | DIASTOLIC BLOOD PRESSURE: 78 MMHG | OXYGEN SATURATION: 98 % | HEART RATE: 74 BPM | RESPIRATION RATE: 18 BRPM | TEMPERATURE: 98.8 F

## 2018-01-16 NOTE — DISCHARGE INSTRUCTIONS
Take the Levaquin daily for the next 6 days.  Recheck in clinic with Dr. Kim in 2 weeks.  Return to the ED if worse/concerns.  Encourage fluids.  Diet act as tolerated.  It was nice visiting with both of you this evening.  I hope you feel better soon.    Thank you for choosing [facility]. We appreciate the opportunity to meet your urgent medical needs. Please let us know if we could have done anything to make your stay more satisfying.    After discharge, please closely monitor for any new or worsening symptoms. Return to the Emergency Department if you develop any acute worsening signs or symptoms.    If you had lab work, cultures or imaging studies done during your stay, the final results may still be pending. We will call you if your plan of care needs to change. However, if you are not improving as expected, please follow up with your primary care provider or clinic.     Start any prescription medications that were prescribed to you and take them as directed.     Please see additional handouts that may be pertinent to your condition.

## 2018-01-16 NOTE — ED NOTES
EMS reported that family had reported a change in LOC, pt was done with tx for UTI 1 week ago, she was normal [facility]

## 2018-01-16 NOTE — ED PROVIDER NOTES
"  History   No chief complaint on file.    HPI  Anaid Dockery is a 87 year old female who presents to the ED with altered mental status.  She had a UTI treated with Keflex starting on December 28.  She improved and the symptoms are similar.  She ate some of her dinner tonight but not as much as usual.  Started going downhill between 6 and 7 PM.  Told her daughter \"help me I cannot breathe\".  Daughter noted a bunch of fluid in her mouth and wiped some of up out with towel.  She's had just a slight cough.  She has dementia and cannot offer much in the way of history.  She cannot tell me if she has any chest or abdominal pain.  Has chronic left arm pain.    Problem List:    Patient Active Problem List    Diagnosis Date Noted     Hypopotassemia 02/12/2017     Priority: Medium     Anemia 02/12/2017     Priority: Medium     Generalized muscle weakness 02/11/2017     Priority: Medium     Hip pain, right 09/07/2016     Priority: Medium     Chronic pain syndrome 09/07/2016     Priority: Medium     Hx of bladder infections 06/28/2016     Priority: Medium     Hypothyroidism, unspecified type 06/08/2016     Priority: Medium     Dementia without behavioral disturbance, unspecified dementia type 11/25/2015     Priority: Medium     Major depressive disorder, recurrent episode, mild (H) 11/25/2015     Priority: Medium     Risk for falls 11/25/2015     Priority: Medium     Persistent insomnia 08/20/2014     Priority: Medium     Chronic foot pain/bilateral 09/20/2012     Priority: Medium     `       Advance Care Planning 04/20/2012     Priority: Medium     Advance Care Planning 11/15/2016: Receipt of ACP document:  Received: POLST which was signed and dated by provider on 9-8-16.  Document previously scanned on 9-12-16.  Order reviewed and found to be valid.  Code Status needs to be updated to reflect choices in most recent ACP document. Orders are DNR only with trial of inbuation.  Confirmed/documented designated decision " maker(s).  Added by Jyoti Angeles RN Advance Care Planning Liaison with Isaac Bella  Advance Care Planning 11/15/2016: Receipt of ACP document:  Received: Health Care Directive which was witnessed or notarized on 3-24-14.  Document previously scanned on 9-20-16.  Validation form completed and sent to be scanned.  Code Status needs to be updated to reflect choices in most recent ACP document. Confirmed/documented designated decision maker(s).  Added by Jyoti Angeles RN Advance Care Planning Liaison with [facility] Bella  Discussed advance care planning with patient; information given to patient to review.//Marcia Shay/[place](AAMA)  4/20/2012          S/P hip replacement 04/20/2012     Priority: Medium     right hip       CKD (chronic kidney disease) stage 3, GFR 30-59 ml/min 12/27/2011     Priority: Medium     Essential hypertension with goal blood pressure less than 140/90 06/27/2011     Priority: Medium     Balance problem 03/24/2011     Priority: Medium     Hip pain 03/24/2011     Priority: Medium     DJD (degenerative joint disease), lumbar 11/10/2009     Priority: Medium     Chronic right shoulder pain 03/04/2009     Priority: Medium     Chronic left shoulder pain 03/04/2009     Priority: Medium     Hard of hearing 10/21/2008     Priority: Medium     Flatulence, eructation, and gas pain 02/12/2008     Priority: Medium     Osteoporosis 04/05/2007     Priority: Medium     Problem list name updated by automated process. Provider to review       Gastroesophageal reflux disease with esophagitis 02/15/2005     Priority: Medium     Slow transit constipation 12/06/2003     Priority: Medium     Asymptomatic varicose veins 08/21/2001     Priority: Medium        Past Medical History:    Past Medical History:   Diagnosis Date     Enthesopathy of hip region      Lump or mass in breast 4/12/2006     Myalgia and myositis, unspecified      NONSPECIFIC MEDICAL HISTORY      NONSPECIFIC MEDICAL HISTORY      Osteoporosis,  unspecified      Other kyphoscoliosis and scoliosis      Sicca syndrome (H)      Thyrotoxicosis without mention of goiter or other cause, without mention of thyrotoxic crisis or storm        Past Surgical History:    Past Surgical History:   Procedure Laterality Date     BIOPSY SALIVARY GLAND,INCISIONAL  03/21/2007    Inferior lip/     C ANESTH,REPAIR LO HERNIA VENTR/INCIS  1970's     C NONSPECIFIC PROCEDURE  04/09/2004    Lumbar epidural steroid injection via the posterior interlaminar approach at the follwoing spina level: L4-5.  The procedure was performed with fluoroscopic guidance.  ([facility]     C REPAIR OF RECTOCELE  8/20/99     C TOTAL ABDOM HYSTERECTOMY  1970's    Hysterectomy, Total Abdominal     C TOTAL HIP ARTHROPLASTY  7/04    Hip Replacement, Total, right     COLONOSCOPY  7/16/2013    Procedure: COLONOSCOPY;  colonoscopy;  Surgeon: Tiago Lowery MD;  Location: [facility]     HC ANTER COLPORRHAPHY,BLAD/VAGINA  8/20/99    Cystocele Repair     HC COLONOSCOPY THRU STOMA, DIAGNOSTIC  4/04     HC CORRECT BUNION,SIMPLE       HC DESTRUCT FACET JT NERVE, LUMB/SACR SINGLE LEVEL  2009     HC DRAIN/INJ MAJOR JOINT/BURSA W/O US  2009    Left shoulder     HC INJ EPIDURAL LUMBAR/SACRAL W/WO CONTRAST  2008    lumbar epidural steroid injection via posterior interlaminar approach at following spinal level-L4-5     HC PLACE NEEDLE WIRE PREOP, BREAST, INITIAL  04/07/06    Wire locallization of right  breast     HC REMOVAL OF LEG VEINS/ULCER      vein stripping     HC REMV CATARACT EXTRACAP,INSERT LENS  7/17/2003    right     HC UGI ENDOSCOPY DIAG W OR W/O BRUSH/WASH  03/08/2006       Family History:    Family History   Problem Relation Age of Onset     CANCER Sister      Unknown       Social History:  Marital Status:   [5]  Social History   Substance Use Topics     Smoking status: Never Smoker     Smokeless tobacco: Never Used      Comment: no smokers in the household     Alcohol use No         Medications:      levofloxacin (LEVAQUIN) 500 MG tablet   HYDROcodone-acetaminophen (NORCO) 5-325 MG per tablet   QUEtiapine (SEROQUEL) 25 MG tablet   ranitidine (ZANTAC) 300 MG tablet   levothyroxine (SYNTHROID/LEVOTHROID) 100 MCG tablet   metoprolol (LOPRESSOR) 25 MG tablet   levocetirizine (XYZAL) 5 MG tablet   FLUoxetine (PROZAC) 20 MG capsule   diclofenac (VOLTAREN) 50 MG EC tablet   donepezil (ARICEPT) 10 MG tablet   mupirocin (BACTROBAN) 2 % ointment   aspirin 81 MG tablet   hydrocortisone-pramoxine (PROCTOFOAM HC) rectal foam   lidocaine (XYLOCAINE) 5 % ointment   order for DME   loperamide (IMODIUM A-D) 2 MG tablet   Acetaminophen (ACTAMIN MAXIMUM STRENGTH OR)   FISH OIL 1200 MG OR CAPS   CALCIUM 500 + D 500-125 MG-UNIT OR TABS   MULTI-VITAMIN TABS   OR         Review of Systems   Unable to perform ROS: Dementia       Physical Exam   BP: 161/84  Pulse: 71  Temp: 98.8  F (37.1  C)  Resp: 18  SpO2: 95 %      Physical Exam   Constitutional: She appears well-developed and well-nourished. No distress.   Laying on her back with her eyes closed.  She does open her eyes when requested.   Eyes:   Pupils are 1-2 mm and reactive.   Neck: Neck supple.   Cardiovascular: Normal rate, regular rhythm and normal heart sounds.    Pulmonary/Chest: Effort normal. She has decreased breath sounds (diffuse).   Abdominal: Soft. Bowel sounds are normal. She exhibits no distension.   Musculoskeletal: Normal range of motion.   Neurological: No cranial nerve deficit or sensory deficit. GCS eye subscore is 3. GCS verbal subscore is 4. GCS motor subscore is 6.   No gross focal deficits.   Skin: Skin is warm and dry. No rash noted.   Psychiatric: She has a normal mood and affect.       ED Course    IV placed.  Labs drawn.  White count normal at 9.2.  Lactic acid was normal.  UA shows positive nitrates, trace leukocyte esterase, 10 WBCs, 1 RBC.  Many bacteria.  Urine culture sent.  Influenza was negative.  Troponin was undetectable.   Chest x-ray was clear.  VBG was normal.    Her caregiver raised some concern about her respiratory status and states that her she had an oxygen level of 50% at home.  I find it very hard to believe since her O2 sats are 97% on room air in the ED and she is in no respiratory distress.  Caregiver does admit that the oximeter is old.  I suspect that was a false reading.  She appears stable from a respiratory standpoint.  Will treat her for a UTI was given 500 mg of Levaquin in the ED.  I asked her to recheck in clinic with her primary physician later this week.  Reevaluate if her symptoms change or worsen.    Her vitals are stable.  She does not appear septic.         ED Course     Procedures            Results for orders placed or performed during the hospital encounter of 01/15/18 (from the past 24 hour(s))   Influenza A/B antigen   Result Value Ref Range    Influenza A/B Agn Specimen Nares     Influenza A Negative NEG^Negative    Influenza B Negative NEG^Negative   CBC with platelets differential   Result Value Ref Range    WBC 9.2 4.0 - 11.0 10e9/L    RBC Count 4.15 3.8 - 5.2 10e12/L    Hemoglobin 11.6 (L) 11.7 - 15.7 g/dL    Hematocrit 37.2 35.0 - 47.0 %    MCV 90 78 - 100 fl    MCH 28.0 26.5 - 33.0 pg    MCHC 31.2 (L) 31.5 - 36.5 g/dL    RDW 13.3 10.0 - 15.0 %    Platelet Count 311 150 - 450 10e9/L    Diff Method Automated Method     % Neutrophils 60.6 %    % Lymphocytes 27.7 %    % Monocytes 6.9 %    % Eosinophils 4.5 %    % Basophils 0.2 %    % Immature Granulocytes 0.1 %    Absolute Neutrophil 5.6 1.6 - 8.3 10e9/L    Absolute Lymphocytes 2.5 0.8 - 5.3 10e9/L    Absolute Monocytes 0.6 0.0 - 1.3 10e9/L    Absolute Eosinophils 0.4 0.0 - 0.7 10e9/L    Absolute Basophils 0.0 0.0 - 0.2 10e9/L    Abs Immature Granulocytes 0.0 0 - 0.4 10e9/L   Comprehensive metabolic panel   Result Value Ref Range    Sodium 141 133 - 144 mmol/L    Potassium 3.9 3.4 - 5.3 mmol/L    Chloride 106 94 - 109 mmol/L    Carbon Dioxide 27 20 -  32 mmol/L    Anion Gap 8 3 - 14 mmol/L    Glucose 125 (H) 70 - 99 mg/dL    Urea Nitrogen 19 7 - 30 mg/dL    Creatinine 0.79 0.52 - 1.04 mg/dL    GFR Estimate 69 >60 mL/min/1.7m2    GFR Estimate If Black 83 >60 mL/min/1.7m2    Calcium 8.8 8.5 - 10.1 mg/dL    Bilirubin Total 0.2 0.2 - 1.3 mg/dL    Albumin 3.5 3.4 - 5.0 g/dL    Protein Total 6.9 6.8 - 8.8 g/dL    Alkaline Phosphatase 55 40 - 150 U/L    ALT 16 0 - 50 U/L    AST 17 0 - 45 U/L   Lactic acid whole blood   Result Value Ref Range    Lactic Acid 1.7 0.7 - 2.0 mmol/L   Troponin I   Result Value Ref Range    Troponin I ES <0.015 0.000 - 0.045 ug/L   Blood gas venous   Result Value Ref Range    Ph Venous 7.39 7.32 - 7.43 pH    PCO2 Venous 45 40 - 50 mm Hg    PO2 Venous 27 25 - 47 mm Hg    Bicarbonate Venous 27 21 - 28 mmol/L    Base Excess Venous 1.6 mmol/L    FIO2 21    XR Chest Port 1 View    Narrative    PORTABLE CHEST ONE VIEW  1/15/2018  8:15 PM     COMPARISON: Frontal chest 2/11/2017    HISTORY: Altered mental status, shortness of breath.    FINDINGS: The cardiac silhouette, pulmonary vasculature, lungs and  pleural spaces are within normal limits.      Impression    IMPRESSION: Clear lungs.    SHRUTHI CALVILLO MD   UA with Microscopic   Result Value Ref Range    Color Urine Yellow     Appearance Urine Clear     Glucose Urine Negative NEG^Negative mg/dL    Bilirubin Urine Negative NEG^Negative    Ketones Urine Negative NEG^Negative mg/dL    Specific Gravity Urine 1.014 1.003 - 1.035    Blood Urine Negative NEG^Negative    pH Urine 6.0 5.0 - 7.0 pH    Protein Albumin Urine Negative NEG^Negative mg/dL    Urobilinogen mg/dL 0.0 0.0 - 2.0 mg/dL    Nitrite Urine Positive (A) NEG^Negative    Leukocyte Esterase Urine Trace (A) NEG^Negative    Source Catheterized Urine     WBC Urine 10 (H) 0 - 2 /HPF    RBC Urine 1 0 - 2 /HPF    Bacteria Urine Many (A) NEG^Negative /HPF    Mucous Urine Present (A) NEG^Negative /LPF     *Note: Due to a large number of results and/or  encounters for the requested time period, some results have not been displayed. A complete set of results can be found in Results Review.           Assessments & Plan (with Medical Decision Making)    (N30.00) Acute cystitis without hematuria  Comment:   Plan: levofloxacin (LEVAQUIN) 500 MG tablet        Urine culture pending.  Recheck in clinic this week.  Verbal and written discharge instructions given.  See discussion above.            I have reviewed the nursing notes.    I have reviewed the findings, diagnosis, plan and need for follow up with the patient.       Discharge Medication List as of 1/15/2018 11:47 PM      START taking these medications    Details   levofloxacin (LEVAQUIN) 500 MG tablet Take 1 tablet (500 mg) by mouth daily for 6 days, Disp-6 tablet, R-0, E-Prescribe             Final diagnoses:   Acute cystitis without hematuria       1/15/2018   [facility] EMERGENCY DEPARTMENT     Franco Vega MD  01/16/18 0010

## 2018-01-19 ENCOUNTER — TELEPHONE (OUTPATIENT)
Dept: EMERGENCY MEDICINE | Facility: CLINIC | Age: 83
End: 2018-01-19

## 2018-01-19 DIAGNOSIS — G89.4 CHRONIC PAIN SYNDROME: ICD-10-CM

## 2018-01-19 DIAGNOSIS — N39.0 URINARY TRACT INFECTION: ICD-10-CM

## 2018-01-19 DIAGNOSIS — M62.81 GENERALIZED MUSCLE WEAKNESS: Primary | ICD-10-CM

## 2018-01-19 LAB
BACTERIA SPEC CULT: ABNORMAL
BACTERIA SPEC CULT: ABNORMAL
Lab: ABNORMAL
SPECIMEN SOURCE: ABNORMAL

## 2018-01-19 RX ORDER — CEFDINIR 300 MG/1
300 CAPSULE ORAL 2 TIMES DAILY
Qty: 14 CAPSULE | Refills: 0 | Status: SHIPPED | OUTPATIENT
Start: 2018-01-19 | End: 2018-01-26

## 2018-01-19 NOTE — TELEPHONE ENCOUNTER
Daphne calls to let Chiquita know that they would like the order for the Medical Bed faxed to [facility] at Fax# [phone].   Daphne states the diagnosis would be unable to ambulate and severe chronic pain.

## 2018-01-19 NOTE — TELEPHONE ENCOUNTER
Anaid seen in ED 1/15/18, treated for UTI.  Discharged on Levaquin, this nurse contacted dtr for change in treatment upon urine culture finalizing today.  Changed from Levaquin to Cefdinir per susceptibility report.      Dtr requests Levaquin be added to allergy list, as she reports confusion and combativeness.  Question of due to intolerance vs ongoing infection?      This nurse unable to add to allergy list, forwarding to PCP to determine and add.      Erica Kaur RN    [facility] RN  Lung Nodule and ED Lab Results F/U RN  Epic pool (ED late result f/u RN) : P [id number]  Ph # [phone]

## 2018-01-19 NOTE — TELEPHONE ENCOUNTER
"[facility] Emergency Department Lab result notification [Adult-Female]    [facility] ED lab result protocol used  Urine culture    Reason for call  Notify of lab results, assess symptoms,  review ED providers recommendations/discharge instructions (if necessary) and advise per ED lab result f/u protocol    Lab Result (including Rx patient on, if applicable)  Final Urine Culture Report on 1/19/18  [facility] ED discharge antibiotic: Levofloxacin (Levaquin) 500 mg PO tablet, 1 tablet (500 mg) by mouth daily for 6 days  #1. Bacteria, >100,000 colonies/mL Escherichia coli,  is [RESISTANT] to ED discharge antibiotic.   #2. Bacteria, 10,000 to 50,000 colonies/mL  Escherichia coli,  is [RESISTANT] to ED discharge antibiotic.   Change in treatment as per [facility] ED Lab result protocol.    Information table from ED Provider visit on 1/15/1  ED diagnosis   Acute cystitis without hematuria    ED provider   Franco Vega MD   Symptoms reported at ED visit (Chief complaint, HPI) Anaid Dockery is a 87 year old female who presents to the ED with altered mental status.  She had a UTI treated with Keflex starting on December 28.  She improved and the symptoms are similar.  She ate some of her dinner tonight but not as much as usual.  Started going downhill between 6 and 7 PM.  Told her daughter \"help me I cannot breathe\".  Daughter noted a bunch of fluid in her mouth and wiped some of up out with towel.  She's had just a slight cough.  She has dementia and cannot offer much in the way of history.  She cannot tell me if she has any chest or abdominal pain.  Has chronic left arm pain.   ED providers Impression and Plan (applicable information) IV placed.  Labs drawn.  White count normal at 9.2.  Lactic acid was normal.  UA shows positive nitrates, trace leukocyte esterase, 10 WBCs, 1 RBC.  Many bacteria.  Urine culture sent.  Influenza was negative.  Troponin was undetectable.  Chest x-ray was clear.  VBG " was normal.     Her caregiver raised some concern about her respiratory status and states that her she had an oxygen level of 50% at home.  I find it very hard to believe since her O2 sats are 97% on room air in the ED and she is in no respiratory distress.  Caregiver does admit that the oximeter is old.  I suspect that was a false reading.  She appears stable from a respiratory standpoint.  Will treat her for a UTI was given 500 mg of Levaquin in the ED.  I asked her to recheck in clinic with her primary physician later this week.  Reevaluate if her symptoms change or worsen.     Her vitals are stable.  She does not appear septic.   Significant Medical hx, if applicable Osteoporosis, dementia, CKD, DJD, GERD, anemia, generalized weakness   Coumadin/Warfarin [Yes /No] No   Creatinine Level (mg/dl) 0.79   Creatinine clearance (ml/min), if applicable 64.9   Pregnant (Yes/No/NA) No   Breastfeeding (Yes/No/NA) No   Allergies Bactrim, Codeine, Cortisone Acetate,Oxycodone   Weight, if applicable 184 # (Feb. 2017)      RN Assessment (Patient s current Symptoms), include time called.  [Insert Left message here if message left]  Dtr reports ongoing confusion, suspects is due to the Levaquin.  Requests this nurse add Levaquin to allergy list, will forward to PCP (question if confusion is due to med vs ongoing infection).  Urine clear, no dehydration per dtr's report.    RN Recommendations/Instructions per [facility] ED lab result protocol  Patient notified of lab result and treatment recommendations.  Rx for Cefdinir sent to [[facility] - [facility]  RN reviewed information about stopping Levaquin once Cefdinir obtained.     Please Contact your PCP clinic or return to the Emergency department if your:    Symptoms return.    Symptoms do not improve after 3 days on antibiotic.    Symptoms do not resolve after completing antibiotic.    Symptoms worsen or other concerning symptom's.    PCP follow-up Questions asked: YES       Erica  POLO Kaur    [facility] RN  Lung Nodule and ED Lab Results F/U RN  Epic pool (ED late result f/u RN) : P [id number]   # [phone]    Copy of Lab result   Order   Urine Culture Aerobic Bacterial [[id number]] (Order [id number])   Exam Information   Exam Date Exam Time Accession # Results    1/15/18  8:43 PM [id number]    Component Results   Component Collected Lab   Specimen Description 01/15/2018  8:43    Unspecified Urine   Special Requests 01/15/2018  8:43 PM 75   Specimen received in preservative   Culture Micro (Abnormal) 01/15/2018  8:43    >100,000 colonies/mL   Escherichia coli      Culture Micro (Abnormal) 01/15/2018  8:43    10,000 to 50,000 colonies/mL   Escherichia coli      Culture & Susceptibility   ESCHERICHIA COLI   Antibiotic Interpretation Sensitivity Unit Method Status   AMPICILLIN Resistant >=32 ug/mL DINESH Final   AMPICILLIN/SULBACTAM Resistant >=32 ug/mL DINESH Final   CEFAZOLIN Resistant 32 ug/mL DINESH Final   Comment: Cefazolin DINESH breakpoints are for the treatment of uncomplicated urinary tract   infections.  For the treatment of systemic infections, please contact the   laboratory for additional testing.   CEFEPIME Sensitive <=1 ug/mL DINESH Final   CEFOXITIN Sensitive 8 ug/mL DINESH Final   CEFTAZIDIME Sensitive <=1 ug/mL DINESH Final   CEFTRIAXONE Sensitive <=1 ug/mL DINESH Final   CIPROFLOXACIN Resistant >=4 ug/mL DINESH Final   GENTAMICIN Sensitive <=1 ug/mL DINESH Final   LEVOFLOXACIN Resistant >=8 ug/mL DINESH Final   NITROFURANTOIN Sensitive 32 ug/mL DINESH Final   Piperacillin/Tazo Resistant >=128 ug/mL DINESH Final   TOBRAMYCIN Sensitive <=1 ug/mL DINESH Final   Trimethoprim/Sulfa Resistant >=16/304 ug/mL DINESH Final         ESCHERICHIA COLI   Antibiotic Interpretation Sensitivity Unit Method Status   AMPICILLIN Resistant >=32 ug/mL DINESH Final   AMPICILLIN/SULBACTAM Resistant >=32 ug/mL DINESH Final   CEFAZOLIN Resistant >=64 ug/mL DINESH Final   Comment: Cefazolin DINESH breakpoints are for the  treatment of uncomplicated urinary tract   infections.  For the treatment of systemic infections, please contact the   laboratory for additional testing.   CEFEPIME Sensitive <=1 ug/mL DINESH Final   CEFOXITIN Sensitive <=4 ug/mL DINESH Final   CEFTAZIDIME Sensitive <=1 ug/mL DINESH Final   CEFTRIAXONE Sensitive <=1 ug/mL DINESH Final   CIPROFLOXACIN Resistant >=4 ug/mL DINESH Final   GENTAMICIN Sensitive <=1 ug/mL DINESH Final   LEVOFLOXACIN Resistant >=8 ug/mL DINESH Final   NITROFURANTOIN Sensitive <=16 ug/mL DINESH Final   Piperacillin/Tazo Resistant >=128 ug/mL DINESH Final   TOBRAMYCIN Sensitive <=1 ug/mL DINESH Final   Trimethoprim/Sulfa Resistant >=16/304 ug/mL DINESH Final

## 2018-01-19 NOTE — TELEPHONE ENCOUNTER
Yes definitely levofloxacin will be added to the allergy list but this is a side effect more probably to the medication and not a true allergy.  Agitation and combativeness were not from the infection but from the medication.  We can let the daughter know that this was from the medication and not illness and that we have added to her allergy list so people do not use it again.  Tai Kim MD

## 2018-01-20 DIAGNOSIS — M35.3 POLYMYALGIA RHEUMATICA (H): ICD-10-CM

## 2018-01-20 DIAGNOSIS — F03.90 DEMENTIA WITHOUT BEHAVIORAL DISTURBANCE, UNSPECIFIED DEMENTIA TYPE: Primary | ICD-10-CM

## 2018-01-22 RX ORDER — DONEPEZIL HYDROCHLORIDE 10 MG/1
TABLET, FILM COATED ORAL
Qty: 90 TABLET | Refills: 2 | Status: SHIPPED | OUTPATIENT
Start: 2018-01-22 | End: 2018-10-29

## 2018-01-22 RX ORDER — HYDROCODONE BITARTRATE AND ACETAMINOPHEN 5; 325 MG/1; MG/1
TABLET ORAL
Qty: 60 TABLET | Refills: 0 | Status: SHIPPED | OUTPATIENT
Start: 2018-01-22 | End: 2018-02-06

## 2018-01-22 NOTE — TELEPHONE ENCOUNTER
Last Written Prescription Date:  5/4/17  Last Fill Quantity: 90,  # refills: 2   Last Office Visit with G, P or [facility] prescribing provider:  11/21/17   Future Office Visit:

## 2018-01-26 ENCOUNTER — TELEPHONE (OUTPATIENT)
Dept: FAMILY MEDICINE | Facility: CLINIC | Age: 83
End: 2018-01-26

## 2018-01-26 NOTE — TELEPHONE ENCOUNTER
Reason for Call:  Other Khadijah states they sent out an order for a home hospital bed     Detailed comments: Wondering if this has been received and or reviewed yet    Phone Number Patient can be reached at: [phone]    Best Time: ay    Can we leave a detailed message on this number? YES    Call taken on 1/26/2018 at 10:12 AM by Hanane Smith

## 2018-01-26 NOTE — TELEPHONE ENCOUNTER
Yes and was given to Noal PORTILLO because in order to get this a face to face visit with signing MD is needed.  Any visit I had may be to old and may be considered Medicare fraud although I totally agree she needs a bed. Dr Vega may be able to sign for this or I need to see her.  Tai Kim MD

## 2018-01-29 NOTE — TELEPHONE ENCOUNTER
I spoke to Khadijah and she said Anaid does need a face to face for the Home Oxygen.  She will call the patient to have them make an appt.

## 2018-01-31 ENCOUNTER — TELEPHONE (OUTPATIENT)
Dept: FAMILY MEDICINE | Facility: CLINIC | Age: 83
End: 2018-01-31

## 2018-01-31 NOTE — TELEPHONE ENCOUNTER
Appointment was scheduled for 2/5/18 and confirmed with daughter Daphne//Marcia Shay/[place](AAMA)

## 2018-01-31 NOTE — TELEPHONE ENCOUNTER
Daphne Hunter informed patient needs to be seen for Face to Face exam for Hospital Bed and chronic conditions.  Appointment was confirmed and scheduled for 2/5/18 and confirmed with Daphne hunter//Marcia Shay/[place]([facility])

## 2018-02-05 ENCOUNTER — OFFICE VISIT (OUTPATIENT)
Dept: FAMILY MEDICINE | Facility: CLINIC | Age: 83
End: 2018-02-05
Payer: MEDICARE

## 2018-02-05 VITALS
HEART RATE: 80 BPM | RESPIRATION RATE: 16 BRPM | WEIGHT: 158 LBS | BODY MASS INDEX: 26.29 KG/M2 | OXYGEN SATURATION: 98 % | TEMPERATURE: 96.7 F | DIASTOLIC BLOOD PRESSURE: 70 MMHG | SYSTOLIC BLOOD PRESSURE: 116 MMHG

## 2018-02-05 DIAGNOSIS — G89.29 CHRONIC FOOT PAIN, UNSPECIFIED LATERALITY: ICD-10-CM

## 2018-02-05 DIAGNOSIS — I10 ESSENTIAL HYPERTENSION WITH GOAL BLOOD PRESSURE LESS THAN 140/90: ICD-10-CM

## 2018-02-05 DIAGNOSIS — M79.673 CHRONIC FOOT PAIN, UNSPECIFIED LATERALITY: ICD-10-CM

## 2018-02-05 DIAGNOSIS — G89.4 CHRONIC PAIN SYNDROME: ICD-10-CM

## 2018-02-05 DIAGNOSIS — Z96.641 STATUS POST RIGHT HIP REPLACEMENT: ICD-10-CM

## 2018-02-05 DIAGNOSIS — Z91.81 RISK FOR FALLS: ICD-10-CM

## 2018-02-05 DIAGNOSIS — F33.0 MAJOR DEPRESSIVE DISORDER, RECURRENT EPISODE, MILD (H): ICD-10-CM

## 2018-02-05 DIAGNOSIS — N39.0 CHRONIC UTI: ICD-10-CM

## 2018-02-05 DIAGNOSIS — G89.29 CHRONIC RIGHT SHOULDER PAIN: ICD-10-CM

## 2018-02-05 DIAGNOSIS — M25.512 CHRONIC LEFT SHOULDER PAIN: ICD-10-CM

## 2018-02-05 DIAGNOSIS — H61.23 BILATERAL IMPACTED CERUMEN: Primary | ICD-10-CM

## 2018-02-05 DIAGNOSIS — M62.81 GENERALIZED MUSCLE WEAKNESS: ICD-10-CM

## 2018-02-05 DIAGNOSIS — F03.90 DEMENTIA WITHOUT BEHAVIORAL DISTURBANCE, UNSPECIFIED DEMENTIA TYPE: ICD-10-CM

## 2018-02-05 DIAGNOSIS — G89.29 CHRONIC LEFT SHOULDER PAIN: ICD-10-CM

## 2018-02-05 DIAGNOSIS — M47.896 OTHER OSTEOARTHRITIS OF SPINE, LUMBAR REGION: ICD-10-CM

## 2018-02-05 DIAGNOSIS — E03.9 HYPOTHYROIDISM, UNSPECIFIED TYPE: ICD-10-CM

## 2018-02-05 DIAGNOSIS — M25.511 CHRONIC RIGHT SHOULDER PAIN: ICD-10-CM

## 2018-02-05 DIAGNOSIS — G47.00 PERSISTENT INSOMNIA: ICD-10-CM

## 2018-02-05 PROCEDURE — 69210 REMOVE IMPACTED EAR WAX UNI: CPT | Performed by: FAMILY MEDICINE

## 2018-02-05 PROCEDURE — 99214 OFFICE O/P EST MOD 30 MIN: CPT | Mod: 25 | Performed by: FAMILY MEDICINE

## 2018-02-05 RX ORDER — TRIMETHOPRIM 100 MG/1
50 TABLET ORAL DAILY
Qty: 30 TABLET | Refills: 1 | Status: SHIPPED | OUTPATIENT
Start: 2018-02-05 | End: 2018-05-29

## 2018-02-05 RX ORDER — LEVOTHYROXINE SODIUM 100 UG/1
100 TABLET ORAL DAILY
Qty: 90 TABLET | Refills: 3 | Status: SHIPPED | OUTPATIENT
Start: 2018-02-05 | End: 2019-03-04

## 2018-02-05 ASSESSMENT — PAIN SCALES - GENERAL: PAINLEVEL: MILD PAIN (2)

## 2018-02-05 NOTE — LETTER
My Depression Action Plan  Name: Anaid Dockery   Date of Birth 2/1/1930  Date: 2/5/2018    My doctor: Tai Kim   My clinic: [address] [address] [phone]          GREEN    ZONE   Good Control    What it looks like:     Things are going generally well. You have normal up s and down s. You may even feel depressed from time to time, but bad moods usually last less than a day.   What you need to do:  1. Continue to care for yourself (see self care plan)  2. Check your depression survival kit and update it as needed  3. Follow your physician s recommendations including any medication.  4. Do not stop taking medication unless you consult with your physician first.           YELLOW         ZONE Getting Worse    What it looks like:     Depression is starting to interfere with your life.     It may be hard to get out of bed; you may be starting to isolate yourself from others.    Symptoms of depression are starting to last most all day and this has happened for several days.     You may have suicidal thoughts but they are not constant.   What you need to do:     1. Call your care team, your response to treatment will improve if you keep your care team informed of your progress. Yellow periods are signs an adjustment may need to be made.     2. Continue your self-care, even if you have to fake it!    3. Talk to someone in your support network    4. Open up your depression survival kit           RED    ZONE Medical Alert - Get Help    What it looks like:     Depression is seriously interfering with your life.     You may experience these or other symptoms: You can t get out of bed most days, can t work or engage in other necessary activities, you have trouble taking care of basic hygiene, or basic responsibilities, thoughts of suicide or death that will not go away, self-injurious behavior.     What you need to do:  1. Call your care team and  request a same-day appointment. If they are not available (weekends or after hours) call your local crisis line, emergency room or 911.      Electronically signed by: Marcia Shay, February 5, 2018    Depression Self Care Plan / Survival Kit    Self-Care for Depression  Here s the deal. Your body and mind are really not as separate as most people think.  What you do and think affects how you feel and how you feel influences what you do and think. This means if you do things that people who feel good do, it will help you feel better.  Sometimes this is all it takes.  There is also a place for medication and therapy depending on how severe your depression is, so be sure to consult with your medical provider and/ or Behavioral Health Consultant if your symptoms are worsening or not improving.     In order to better manage my stress, I will:    Exercise  Get some form of exercise, every day. This will help reduce pain and release endorphins, the  feel good  chemicals in your brain. This is almost as good as taking antidepressants!  This is not the same as joining a gym and then never going! (they count on that by the way ) It can be as simple as just going for a walk or doing some gardening, anything that will get you moving.      Hygiene   Maintain good hygiene (Get out of bed in the morning, Make your bed, Brush your teeth, Take a shower, and Get dressed like you were going to work, even if you are unemployed).  If your clothes don't fit try to get ones that do.    Diet  I will strive to eat foods that are good for me, drink plenty of water, and avoid excessive sugar, caffeine, alcohol, and other mood-altering substances.  Some foods that are helpful in depression are: complex carbohydrates, B vitamins, flaxseed, fish or fish oil, fresh fruits and vegetables.    Psychotherapy  I agree to participate in Individual Therapy (if recommended).    Medication  If prescribed medications, I agree to take them.  Missing doses  can result in serious side effects.  I understand that drinking alcohol, or other illicit drug use, may cause potential side effects.  I will not stop my medication abruptly without first discussing it with my provider.    Staying Connected With Others  I will stay in touch with my friends, family members, and my primary care provider/team.    Use your imagination  Be creative.  We all have a creative side; it doesn t matter if it s oil painting, sand castles, or mud pies! This will also kick up the endorphins.    Witness Beauty  (AKA stop and smell the roses) Take a look outside, even in mid-winter. Notice colors, textures. Watch the squirrels and birds.     Service to others  Be of service to others.  There is always someone else in need.  By helping others we can  get out of ourselves  and remember the really important things.  This also provides opportunities for practicing all the other parts of the program.    Humor  Laugh and be silly!  Adjust your TV habits for less news and crime-drama and more comedy.    Control your stress  Try breathing deep, massage therapy, biofeedback, and meditation. Find time to relax each day.     My support system    Clinic Contact:  Phone number:    Contact 1:  Phone number:    Contact 2:  Phone number:    Lutheran/:  Phone number:    Therapist:  Phone number:    Local crisis center:    Phone number:    Other community support:  Phone number:

## 2018-02-05 NOTE — PROGRESS NOTES
"  SUBJECTIVE:   Anaid Dockery is a 88 year old female who presents to clinic today for the following health issues:      Hypertension Follow-up      Outpatient blood pressures are being checked at home.  Results are unsure results - knows it was \"low\".    Low Salt Diet: no added salt    Depression Followup    Status since last visit: Stable     See PHQ-9 for current symptoms.  Other associated symptoms: None    Complicating factors:   Significant life event:  No   Current substance abuse:  None  Anxiety or Panic symptoms:  Yes-  \"Sundowners\"    PHQ-9 2/26/2016 9/16/2016 3/1/2017   Total Score 10 1 6   Q9: Suicide Ideation Several days Not at all Not at all     Patient reported is doing quite well by family and caregiver.  She does have some hallucinations where she sees children.  These have been quieted by Seroquel without obvious side effects.  Mostly she is pleasant and cooperative.    Patient recently had urinary tract infection which increased her confusion.  Since treatment this is at least now at baseline.  She has recurrent infections and family and staff wonder if there is a treatment we can use to prevent infections.  A low-dose antibiotic he is requested.    During her recent evaluation and treatment for infection it was apparent that a hospital bed was most useful for her.  She has chronic arthritis of shoulders, back, knees, hips.  She has trouble getting comfortable in bed and trouble laying flat.  She has trouble ambulating in and out of bed and therefore a bed that lowers to make it easier for transfer is requested.    Patient is noticed to have more hearing difficulties and family wonders if wax might be in the canals as this is been a problem before when her hearing aids squeal or seem less effective.  PHQ-9  English  PHQ-9   Any Language  Suicide Assessment Five-step Evaluation and Treatment (SAFE-T)  Chronic Kidney Disease Follow-up      Current NSAID use?  Yes:   diclofenac (Cataflam, " Voltaren)  Frequency: twice a day    Recheck Dementia and face to face for hospital bed      Amount of exercise or physical activity: None    Problems taking medications regularly: No    Medication side effects: none    Diet: regular (no restrictions)            Problem list and histories reviewed & adjusted, as indicated.  Additional history: as documented    BP Readings from Last 3 Encounters:   02/05/18 116/70   01/16/18 150/78   08/21/17 (P) 128/78    Wt Readings from Last 3 Encounters:   02/05/18 158 lb (71.7 kg)   08/21/17 (P) 163 lb (73.9 kg)   02/13/17 184 lb 1.4 oz (83.5 kg)                  Labs reviewed in EPIC    Reviewed and updated as needed this visit by clinical staff       Reviewed and updated as needed this visit by Provider         ROS:  Constitutional, HEENT, cardiovascular, pulmonary, gi and gu systems are negative, except as otherwise noted.    OBJECTIVE:     /70 (BP Location: Right arm, Patient Position: Sitting, Cuff Size: Adult Regular)  Pulse 80  Temp 96.7  F (35.9  C) (Temporal)  Resp 16  Wt 158 lb (71.7 kg)  SpO2 98%  BMI 26.29 kg/m2  Body mass index is 26.29 kg/(m^2).  GENERAL: healthy, alert and no distress  EYES: Eyes grossly normal to inspection, PERRL and conjunctivae and sclerae normal  HENT: normal cephalic/atraumatic, nose and mouth without ulcers or lesions, oropharynx clear, oral mucous membranes moist and marked wax occluding both ear canals  NECK: no adenopathy, no asymmetry, masses, or scars and thyroid normal to palpation  RESP: lungs clear to auscultation - no rales, rhonchi or wheezes  CV: Regular with murmur  ABDOMEN: soft, nontender, no hepatosplenomegaly, no masses and bowel sounds normal  MS: Moves extremities but limited range of motion at both shoulders.  This seems painful as well.  Patient has difficulty standing also as if in pain.  Ambulation nearly impossible.  SKIN: no suspicious lesions or rashes  NEURO: Patient's mentation is okay for person and  "place but not time.  Otherwise very grossly normal neurologic exam with diminished hearing even with aids  PSYCH: mentation appears normal, judgement and insight intact, appearance well groomed and answers questions appropriately some and inappropriately with others.  Even with repeating to make sure she understands/hears/comprehends answer sometimes or inappropriate    Diagnostic Test Results:  none     ASSESSMENT/PLAN:           Tobacco Cessation:   reports that she has never smoked. She has never used smokeless tobacco.      BMI:   Estimated body mass index is 26.29 kg/(m^2) as calculated from the following:    Height as of 9/7/16: 5' 5\" (1.651 m).    Weight as of this encounter: 158 lb (71.7 kg).         1. Major depressive disorder, recurrent episode, mild (H)  Currently depression seems to be relatively well-controlled with fluoxetine and mentation with Aricept and Seroquel.  No change plan despite age because things seem to be working for patient and family  - DEPRESSION ACTION PLAN (DAP)    2. Essential hypertension with goal blood pressure less than 140/90  Well-controlled  - aspirin 81 MG tablet; ONE DAILY  Dispense: 100 tablet; Refill: 3    3. Hypothyroidism, unspecified type  Well-controlled  - levothyroxine (SYNTHROID/LEVOTHROID) 100 MCG tablet; Take 1 tablet (100 mcg) by mouth daily  Dispense: 90 tablet; Refill: 3    4. Risk for falls  Given arthritis, weakness etc. at risk to fall.  1 of the reasons hospital bed is considered.  It would allow easier transfer and help her get into a comfortable position in bed  My patient requires body positioning not feasible in an ordinary bed to alleviate pain secondary to chronic pain syndrome from osteoarthritis of multiple joints and recent hip replacement requires head of bed to be elevated more than 30  and frequent changes in body position due to chronic pain and osteoarthritis of multiple joints and recent hip replacement as well as to facilitate transfers " from bed to chair      5. Status post right hip replacement  With arthritis hips and hip replacement, again hospital bed may be very helpful for positioning and comfort for sleep    6. Persistent insomnia  Present and probably cannot do much differently certainly no more medication    7. Generalized muscle weakness  Discussed that she has this present and she should be as active as family can manage.    8. Dementia without behavioral disturbance, unspecified dementia type  Mild with pleasant demeanor.  For now we will continue Aricept.  She is doing so well and I would eat to lose capacity she has.    9. Chronic pain syndrome  This is general musculoskeletal aches and pains which are well treated with some narcotic medication and again hospital bed will help facilitate comfort for sleep.  I chosen not to do a urinary test for other drugs given age in current living situation  My patient requires body positioning not feasible in an ordinary bed to alleviate pain secondary to chronic pain syndrome from osteoarthritis of multiple joints requires head of bed to be elevated more than 30  and frequent changes in body position due to chronic pain and osteoarthritis of multiple joints as well as to facilitate transfers from bed to chair    10. Chronic UTI  Trimethoprim should be tolerated even though she does not tolerate Bactrim.  Usually it is sulfa which causes nausea and side effects.  May need to consider adjustment  - trimethoprim (TRIMPEX) 100 MG tablet; Take 0.5 tablets (50 mg) by mouth daily  Dispense: 30 tablet; Refill: 1    11. Bilateral impacted cerumen  This was visualized and removed using a plastic ear curette bilaterally.  Good result and better hearing immediately.  This was apparent because she heard some conversation even without aids in which was not possible prior to removal of wax  - REMOVE IMPACTED CERUMEN    12. Other osteoarthritis of spine, lumbar region  Leads to trouble sleeping more hospital bed  positioning will help    13. Chronic right shoulder pain  As above    14. Chronic left shoulder pain  As above    15. Chronic foot pain, unspecified laterality  With hospital bed it will ease transition from chair to bed if she has a hospital bed that can be lowered.      MEDICATIONS:  Continue current medications without change  Patient Instructions   Her chart is flagging need for many labs which she does not need. She had  necessary ones in early January with need to repeat in July.    Trimethoprim half tab once daily and if it does cause side effects, call for alternative    She may be listed as a candidate for multiple blood work.  Given age and other issues, mostly we do not need to do these more than 1 every 6 months.  She had testing in January.  I will continue aspirin.  There may be a slight increased risk for bleeding if she should fall.  This is a limited risk and it would be sad if by stopping aspirin she had a stroke which did not kill her and only made her more disabled.  Paperwork completed for hospital bed  Time spent with greater than 50% spent in discussion, making the plan, or filling out paperwork with patient for illness/treatments was 25 minutes or more.    Tai Kim MD  [facility]

## 2018-02-05 NOTE — PATIENT INSTRUCTIONS
Her chart is flagging need for many labs which she does not need. She had  necessary ones in early January with need to repeat in July.    Trimethoprim half tab once daily and if it does cause side effects, call for alternative

## 2018-02-05 NOTE — MR AVS SNAPSHOT
After Visit Summary   2/5/2018    Anaid Dockery    MRN: [id number]           Patient Information     Date Of Birth          2/1/1930        Visit Information        Provider Department      2/5/2018 1:45 PM Tai Kim MD [facility]        Today's Diagnoses     Bilateral impacted cerumen    -  1    Major depressive disorder, recurrent episode, mild (H)        Essential hypertension with goal blood pressure less than 140/90        Hypothyroidism, unspecified type        Risk for falls        Status post right hip replacement        Persistent insomnia        Generalized muscle weakness        Dementia without behavioral disturbance, unspecified dementia type        Chronic pain syndrome        Chronic UTI        Other osteoarthritis of spine, lumbar region        Chronic right shoulder pain        Chronic left shoulder pain        Chronic foot pain, unspecified laterality          Care Instructions    Her chart is flagging need for many labs which she does not need. She had  necessary ones in early January with need to repeat in July.    Trimethoprim half tab once daily and if it does cause side effects, call for alternative          Follow-ups after your visit        Who to contact     If you have questions or need follow up information about today's clinic visit or your schedule please contact [facility] directly at [phone].  Normal or non-critical lab and imaging results will be communicated to you by MyChart, letter or phone within 4 business days after the clinic has received the results. If you do not hear from us within 7 days, please contact the clinic through MyChart or phone. If you have a critical or abnormal lab result, we will notify you by phone as soon as possible.  Submit refill requests through [employer] or call your pharmacy and they will forward the refill request to us. Please allow 3 business days for your refill to be completed.           Additional Information About Your Visit        [employer] Information     [employer] gives you secure access to your electronic health record. If you see a primary care provider, you can also send messages to your care team and make appointments. If you have questions, please call your primary care clinic.  If you do not have a primary care provider, please call [phone] and they will assist you.        Care EveryWhere ID     This is your Care EveryWhere ID. This could be used by other organizations to access your [place] medical records  [id number]        Your Vitals Were     Pulse Temperature Respirations Pulse Oximetry BMI (Body Mass Index)       80 96.7  F (35.9  C) (Temporal) 16 98% 26.29 kg/m2        Blood Pressure from Last 3 Encounters:   02/05/18 116/70   01/16/18 150/78   08/21/17 (P) 128/78    Weight from Last 3 Encounters:   02/05/18 158 lb (71.7 kg)   08/21/17 (P) 163 lb (73.9 kg)   02/13/17 184 lb 1.4 oz (83.5 kg)              We Performed the Following     DEPRESSION ACTION PLAN (DAP)     REMOVE IMPACTED CERUMEN          Today's Medication Changes          These changes are accurate as of 2/5/18  5:24 PM.  If you have any questions, ask your nurse or doctor.               Start taking these medicines.        Dose/Directions    trimethoprim 100 MG tablet   Commonly known as:  TRIMPEX   Used for:  Risk for falls, Chronic UTI   Started by:  Tai Kim MD        Dose:  50 mg   Take 0.5 tablets (50 mg) by mouth daily   Quantity:  30 tablet   Refills:  1         These medicines have changed or have updated prescriptions.        Dose/Directions    levothyroxine 100 MCG tablet   Commonly known as:  SYNTHROID/LEVOTHROID   This may have changed:  See the new instructions.   Used for:  Hypothyroidism, unspecified type   Changed by:  Tai Kim MD        Dose:  100 mcg   Take 1 tablet (100 mcg) by mouth daily   Quantity:  90 tablet   Refills:  3            Where to get your medicines       These medications were sent to [facility] [place] - [address]   [address] , [place] [address]     Phone:  [phone]     levothyroxine 100 MCG tablet    trimethoprim 100 MG tablet                Primary Care Provider Office Phone # Fax #    Tai Kim [phone] [address]   [place] [address]        Equal Access to Services     [facility] Center[facility] : Hadii aad ku hadasho Soomaali, waaxda luqadaha, qaybta kaalmada adeegyada, waxay idiin hayaan adeeg khnikkysh lajorge l . So [facility] [phone].    ATENCIÓN: Si habla espmiladis, tiene a bennett disposición servicios gratuitos de asistencia lingüística. Juan al [phone].    We comply with applicable federal civil rights laws and [place] laws. We do not discriminate on the basis of race, color, national origin, age, disability, sex, sexual orientation, or gender identity.            Thank you!     Thank you for choosing [facility]  for your care. Our goal is always to provide you with excellent care. Hearing back from our patients is one way we can continue to improve our services. Please take a few minutes to complete the written survey that you may receive in the mail after your visit with us. Thank you!             Your Updated Medication List - Protect others around you: Learn how to safely use, store and throw away your medicines at www.disposemymeds.org.          This list is accurate as of 2/5/18  5:24 PM.  Always use your most recent med list.                   Brand Name Dispense Instructions for use Diagnosis    ACTAMIN MAXIMUM STRENGTH OR      Take 500 mg by mouth every 6 hours. Every 4-6 hours for pain        aspirin 81 MG tablet     100 tablet    ONE DAILY    Essential hypertension with goal blood pressure less than 140/90       CALCIUM 500 + D 500-125 MG-UNIT Tabs   Generic drug:  Calcium Carbonate-Vitamin D      1 A DAY    Osteoporosis, unspecified       diclofenac 50 MG  EC tablet    VOLTAREN    180 tablet    TAKE ONE TABLET BY MOUTH TWICE A DAY    CKD (chronic kidney disease) stage 3, GFR 30-59 ml/min, Essential hypertension with goal blood pressure less than 140/90       donepezil 10 MG tablet    ARICEPT    90 tablet    TAKE ONE TABLET BY MOUTH EVERY NIGHT AT BEDTIME    Dementia without behavioral disturbance, unspecified dementia type       FLUoxetine 20 MG capsule    PROzac    90 capsule    TAKE ONE CAPSULE BY MOUTH EVERY DAY    Major depressive disorder, recurrent episode, mild (H)       HYDROcodone-acetaminophen 5-325 MG per tablet    NORCO    60 tablet    TAKE 1 TABLET BY ORAL ROUTE EVERY 6 HOURS AS NEEDED FOR PAIN    Polymyalgia rheumatica (H)       hydrocortisone-pramoxine rectal foam    PROCTOFOAM HC    10 g    Place 1 applicator rectally 2 times daily    External hemorrhoids       levocetirizine 5 MG tablet    XYZAL    30 tablet    TAKE ONE TABLET BY MOUTH EVERY EVENING    Seasonal allergic rhinitis       levothyroxine 100 MCG tablet    SYNTHROID/LEVOTHROID    90 tablet    Take 1 tablet (100 mcg) by mouth daily    Hypothyroidism, unspecified type       lidocaine 5 % ointment    XYLOCAINE    50 g    Apply topically 2 times daily To affected areas.    Pain of left upper extremity       loperamide 2 MG tablet    IMODIUM A-D    30 tablet    Start with 2 tabs (4 mg), then take one tab (2 mg) after each diarrheal stool.  Do not use more than  8 tabs (16 mg) per day.    Diarrhea       metoprolol tartrate 25 MG tablet    LOPRESSOR    270 tablet    TAKE ONE TABLET BY MOUTH EVERY MORNING AND TAKE TWO TABLETS EVERY EVENING    Hypertension goal BP (blood pressure) < 140/90       Multi-vitamin Tabs tablet   Generic drug:  multivitamin, therapeutic with minerals     120    1 tablet daily        mupirocin 2 % ointment    BACTROBAN    22 g    Apply topically 3 times daily    Cellulitis of face       order for DME     1 each    Equipment being ordered: Hospital Bed    Generalized muscle  weakness, Chronic pain syndrome       QUEtiapine 25 MG tablet    SEROquel    225 tablet    TAKE ONE TABLET BY MOUTH AT BEDTIME AND TAKE 1 AND 1/2 TABLETS DAILY AS NEEDED FOR RESTLESSNESS/AGITATION    Dementia without behavioral disturbance, unspecified dementia type       ranitidine 300 MG tablet    ZANTAC    90 tablet    TAKE ONE TABLET BY MOUTH EVERY DAY    Gastroesophageal reflux disease with esophagitis       trimethoprim 100 MG tablet    TRIMPEX    30 tablet    Take 0.5 tablets (50 mg) by mouth daily    Risk for falls, Chronic UTI

## 2018-02-06 DIAGNOSIS — M35.3 POLYMYALGIA RHEUMATICA (H): ICD-10-CM

## 2018-02-06 ASSESSMENT — PATIENT HEALTH QUESTIONNAIRE - PHQ9: SUM OF ALL RESPONSES TO PHQ QUESTIONS 1-9: 10

## 2018-02-07 RX ORDER — HYDROCODONE BITARTRATE AND ACETAMINOPHEN 5; 325 MG/1; MG/1
TABLET ORAL
Qty: 60 TABLET | Refills: 0 | Status: SHIPPED | OUTPATIENT
Start: 2018-02-07 | End: 2018-02-27

## 2018-02-07 NOTE — TELEPHONE ENCOUNTER
Norco 5-325 MG       Last Written Prescription Date:  1/22/18  Last Fill Quantity: 60,   # refills: 0  Last Office Visit: 2/5/18  Future Office visit:       Routing refill request to provider for review/approval because:  Drug not on the [facility], [facility] or [facility] refill protocol or controlled substance

## 2018-02-14 ENCOUNTER — TELEPHONE (OUTPATIENT)
Dept: FAMILY MEDICINE | Facility: CLINIC | Age: 83
End: 2018-02-14

## 2018-02-14 NOTE — TELEPHONE ENCOUNTER
Khadijah calls to check on the status of an order for a Hospital Bed for pt.  Khadijah states they need a corrected order and chart notes.

## 2018-02-23 ENCOUNTER — TELEPHONE (OUTPATIENT)
Dept: FAMILY MEDICINE | Facility: CLINIC | Age: 83
End: 2018-02-23

## 2018-02-23 NOTE — TELEPHONE ENCOUNTER
I faxed the letter and then Kenyatta from [facility] called back and said there is a form that needs to be filled out and the letter your wrote needs to be in an office visit.  Casandra didn't understand what was needed.  Form on Edyta's desk.

## 2018-02-23 NOTE — LETTER
[address] [address]  Phone: [phone]  Fax: [phone]    February 23, 2018        Anaid Dockery  [address] [address] [place] [id number]          To whom it may concern:    RE: Anaid Dockery    Patient was seen earlier in February for a face-to-face visit and she is well known to me.  She has multiple joint pains including shoulders, hips, knees.  She has marked degenerative lumbar spine disease.  Optimal  positioning for sleep is necessary for comfort.  Transfer into a regular bed is difficult because she requires assistance and the height regular bed prevents safe and easy transfer.  A bed that can be lowered minimizes her risk of falling during transfer.  Patient also has underlying dementia.  At times, she is uncooperative with transfer, and therefore the easiest possible way to transfer from chair to bed or back is necessary.  It is my opinion that a hospital bed with adjustable positions and height is important for her care.      Please contact me for questions or concerns.      Sincerely,        Taiderian Kim MD

## 2018-02-23 NOTE — TELEPHONE ENCOUNTER
Reason for Call:  Other information     Detailed comments: Daphne calling to let provider know that they are working on getting a medical bed that is needed for her mother, they are going through [employer] for this and they need a letter stating that this is a medical necessity for this patient sent to [employer] please fax to #[phone]  Attn: Kenyatta    Phone Number Patient can be reached at: Other phone number:  [phone]    Best Time: any    Can we leave a detailed message on this number? YES    Call taken on 2/23/2018 at 11:16 AM by Rukhsana Pollack

## 2018-02-26 NOTE — TELEPHONE ENCOUNTER
I have the forms on my desk Dr. Kim. Let me know when you are done with below info that they need and I can fax it over again.

## 2018-02-26 NOTE — TELEPHONE ENCOUNTER
[facility] is calling back. They are in need of a statement and chart notes in regards to need for frequent position changes with a specific diagnosis. Can be faxed to [phone].  Thank you,  Lalita Bennett   for [facility]

## 2018-02-27 DIAGNOSIS — M35.3 POLYMYALGIA RHEUMATICA (H): ICD-10-CM

## 2018-02-27 NOTE — TELEPHONE ENCOUNTER
Candaceco      Last Written Prescription Date:  2/07/2018  Last Fill Quantity: 60,   # refills: 0  Last Office Visit: 2/05/2018  Future Office visit:       Routing refill request to provider for review/approval because:  Drug not on the [facility], [facility] or [facility] refill protocol or controlled substance

## 2018-02-27 NOTE — TELEPHONE ENCOUNTER
Lm for Kenyatta to return my call as to exactly what she wants because Dr. Kim said he didn't know what to put in the office notes and what statement do they want.

## 2018-02-28 RX ORDER — HYDROCODONE BITARTRATE AND ACETAMINOPHEN 5; 325 MG/1; MG/1
TABLET ORAL
Qty: 60 TABLET | Refills: 0 | Status: SHIPPED | OUTPATIENT
Start: 2018-02-28 | End: 2018-03-20

## 2018-02-28 NOTE — TELEPHONE ENCOUNTER
Revised forms sent to [facility] Dr. Tai Kim/Marcia Shay CMA ([facility])

## 2018-03-20 DIAGNOSIS — M35.3 POLYMYALGIA RHEUMATICA (H): ICD-10-CM

## 2018-03-20 RX ORDER — HYDROCODONE BITARTRATE AND ACETAMINOPHEN 5; 325 MG/1; MG/1
TABLET ORAL
Qty: 60 TABLET | Refills: 0 | Status: SHIPPED | OUTPATIENT
Start: 2018-03-20 | End: 2018-04-10

## 2018-03-20 NOTE — TELEPHONE ENCOUNTER
Norco 5-325 MG       Last Written Prescription Date:  2/28/18  Last Fill Quantity: 60,   # refills: 0  Last Office Visit: 2/5/18  Future Office visit:       Routing refill request to provider for review/approval because:  Drug not on the [facility], [facility] or [facility] refill protocol or controlled substance

## 2018-04-06 ENCOUNTER — MYC MEDICAL ADVICE (OUTPATIENT)
Dept: FAMILY MEDICINE | Facility: CLINIC | Age: 83
End: 2018-04-06

## 2018-04-06 DIAGNOSIS — K64.4 EXTERNAL HEMORRHOIDS: ICD-10-CM

## 2018-04-06 NOTE — TELEPHONE ENCOUNTER
Call them back to get more information.  Depending on how the patient is feeling, how much bleeding she is actually doing, you can decide if she needs to go to the ED or if she can just follow-up in clinic with her provider next week

## 2018-04-06 NOTE — TELEPHONE ENCOUNTER
Allyson  Can you make recommendation on this please? ED visit?   I would be happy to call them.    Katie Angeles RN

## 2018-04-06 NOTE — TELEPHONE ENCOUNTER
Daughter Chiquita states that they are fine with waiting until next week.  Anaid is hard to transport so Chiquita is hoping to have the hemrroids taken care of when they are here next.  RN advised that we would consult with Dr Kim on Monday for a plan.    Chiquita verbalized that she will get her mother into the ED over the weekend if she gets a fever, pain, redness. Currently Chiquita states that the amount of blood is like a period flow.  Katie Angeles RN

## 2018-04-09 NOTE — TELEPHONE ENCOUNTER
"RN TRIAGE CALL:    Patient Contact    Attempt # 2    Was call answered?  Yes.  \"May I please speak with <patient name>\"  Is patient available?   No. Left message with spouse. RN will call before 2:00 on tuesday.      "

## 2018-04-09 NOTE — TELEPHONE ENCOUNTER
A minimal approach is the best way to address hemorrhoids at her age and health.  Proctofoam or Anusol suppositories would be a good choice.  Use as directed on the packaging.  Making sure her bowel movements are not hard and firm and that she does not have to strain to have them.  We could offer advice about this if necessary.  MiraLAX would be an option if she has any tendency to constipation.  Regarding a visit, the most effective would be to see a surgeon but it still would require multiple visits and surgical intervention.  If bleeding can be managed without more aggressive treatment, that would be ideal.  Gentle replacement of the hemorrhoids back in the rectal vault will help.  Again, surgical intervention might be done but I am not sure how much sedation, etc. would be required and it would require at least one visit for an evaluation in 1 visit to set up the procedure.  I do think if bleeding can be controlled, nothing further really needs to be done.  Tai Kim MD

## 2018-04-09 NOTE — TELEPHONE ENCOUNTER
RN TRIAGE CALL:    Patient Contact    Attempt # 1    Was call answered?  No.  Unable to leave message.  Katie Angeles RN

## 2018-04-10 DIAGNOSIS — M35.3 POLYMYALGIA RHEUMATICA (H): ICD-10-CM

## 2018-04-10 NOTE — TELEPHONE ENCOUNTER
Rn spoke with Shirley's daughter, Chiquita. Bleeding resolved over the weekend. They have been using stools softeners, suppositories and got her a donut for her chair.      We discussed at length that surgery would not be an good idea as stated by Dr Kim.  Chiquita was in agreement.      Proctofoam re-ordered.    Katie Angeles RN

## 2018-04-10 NOTE — TELEPHONE ENCOUNTER
Norco 5-325 MG       Last Written Prescription Date:  3/20/18  Last Fill Quantity: 60,   # refills: 0  Last Office Visit: 2/5/18  Future Office visit:       Routing refill request to provider for review/approval because:  Drug not on the [facility], [facility] or [facility] refill protocol or controlled substance

## 2018-04-11 RX ORDER — HYDROCODONE BITARTRATE AND ACETAMINOPHEN 5; 325 MG/1; MG/1
TABLET ORAL
Qty: 60 TABLET | Refills: 0 | Status: SHIPPED | OUTPATIENT
Start: 2018-04-11 | End: 2018-05-01

## 2018-05-01 DIAGNOSIS — M35.3 POLYMYALGIA RHEUMATICA (H): ICD-10-CM

## 2018-05-01 NOTE — TELEPHONE ENCOUNTER
Norco   5-325 MG     Last Written Prescription Date:  4/11/18  Last Fill Quantity: 60,   # refills: 0  Last Office Visit: 2/5/18  Future Office visit:       Routing refill request to provider for review/approval because:  Drug not on the [facility], [facility] or [facility] refill protocol or controlled substance

## 2018-05-02 RX ORDER — HYDROCODONE BITARTRATE AND ACETAMINOPHEN 5; 325 MG/1; MG/1
TABLET ORAL
Qty: 60 TABLET | Refills: 0 | Status: SHIPPED | OUTPATIENT
Start: 2018-05-02 | End: 2018-05-22

## 2018-05-02 NOTE — TELEPHONE ENCOUNTER
Signed original RX delivered to [facility]. Nola,

## 2018-05-22 DIAGNOSIS — K64.4 EXTERNAL HEMORRHOIDS: ICD-10-CM

## 2018-05-22 DIAGNOSIS — M35.3 POLYMYALGIA RHEUMATICA (H): ICD-10-CM

## 2018-05-22 NOTE — TELEPHONE ENCOUNTER
Hydrocodone-acetaminophen      Last Written Prescription Date:  5/2/18  Last Fill Quantity: 60,   # refills: 1  Last Office Visit: 2/5/18  Future Office visit:       Routing refill request to provider for review/approval because:  Drug not on the [facility], [facility] or [facility] refill protocol or controlled substance  Celestina Ayoub MA

## 2018-05-23 RX ORDER — HYDROCODONE BITARTRATE AND ACETAMINOPHEN 5; 325 MG/1; MG/1
TABLET ORAL
Qty: 60 TABLET | Refills: 0 | Status: SHIPPED | OUTPATIENT
Start: 2018-05-23 | End: 2018-06-12

## 2018-06-12 DIAGNOSIS — M35.3 POLYMYALGIA RHEUMATICA (H): ICD-10-CM

## 2018-06-13 RX ORDER — HYDROCODONE BITARTRATE AND ACETAMINOPHEN 5; 325 MG/1; MG/1
TABLET ORAL
Qty: 60 TABLET | Refills: 0 | Status: SHIPPED | OUTPATIENT
Start: 2018-06-13 | End: 2018-07-03

## 2018-06-13 NOTE — TELEPHONE ENCOUNTER
Candaceco      Last Written Prescription Date:  5/23/2018  Last Fill Quantity: 60,   # refills: 0  Last Office Visit: 2/05/2018  Future Office visit:       Routing refill request to provider for review/approval because:  Drug not on the [facility], [facility] or [facility] refill protocol or controlled substance

## 2018-07-03 ENCOUNTER — DOCUMENTATION ONLY (OUTPATIENT)
Dept: CARE COORDINATION | Facility: CLINIC | Age: 83
End: 2018-07-03

## 2018-07-03 DIAGNOSIS — M35.3 POLYMYALGIA RHEUMATICA (H): ICD-10-CM

## 2018-07-03 DIAGNOSIS — I10 HYPERTENSION GOAL BP (BLOOD PRESSURE) < 140/90: ICD-10-CM

## 2018-07-03 NOTE — TELEPHONE ENCOUNTER
"Requested Prescriptions   Pending Prescriptions Disp Refills     metoprolol tartrate (LOPRESSOR) 25 MG tablet [Pharmacy Med Name: METOPROLOL TARTRATE 25MG TABS] 270 tablet 2     Sig: TAKE ONE TABLET BY MOUTH EVERY MORNING AND TWO TABLETS EVERY EVENING    Beta-Blockers Protocol Passed    7/3/2018  1:32 PM       Passed - Blood pressure under 140/90 in past 12 months    BP Readings from Last 3 Encounters:   02/05/18 116/70   01/16/18 150/78   08/21/17 (P) 128/78                Passed - Patient is age 6 or older       Passed - Recent (12 mo) or future (30 days) visit within the authorizing provider's specialty    Patient had office visit in the last 12 months or has a visit in the next 30 days with authorizing provider or within the authorizing provider's specialty.  See \"Patient Info\" tab in inbasket, or \"Choose Columns\" in Meds & Orders section of the refill encounter.              Last Written Prescription Date:  10/12/17  Last Fill Quantity: 270,  # refills: 2   Last Office Visit with G, P or [facility] prescribing provider:  2/5/18   Future Office Visit:       "

## 2018-07-03 NOTE — TELEPHONE ENCOUNTER
Norco 5-325 MG       Last Written Prescription Date:  6-13-18  Last Fill Quantity: 60,   # refills: 0  Last Office Visit: 2/5/18  Future Office visit:       Routing refill request to provider for review/approval because:  Drug not on the [facility], [facility] or [facility] refill protocol or controlled substance

## 2018-07-03 NOTE — PROGRESS NOTES
[facility] utilizes an encounter to take the place of a direct phone call to your office. Please take a moment to review the below request. Please reply or route message to author of this encounter.  Message will act as a verbal OK of orders requested below. Thank you.    Dr Villalpando,    Pt's daughter Daphne called and states that pt has been progressively getting worse over time and is wondering if she is now appropriate for hospice services. States that she has had weight loss but unable to weigh her, having choking episodes with swallowing, decreased intake, sleeping most of the time, decreased communication and incontinence. If you agree pt has a prognosis of 6 months or less and agree with hospice for this pt  I am requesting orders as below.     OK to admit to hospice. OK for hospice orders. OK for current meds and treatments.  MSW eval and treat and admit to hospice by hospice consult.    If you have any questions please feel free to call me at the number below. Thank you for your assist with this patient.    Daja Monroe RN [facility]  Hospice Day Triage RN    [phone]  [email or web address]

## 2018-07-06 RX ORDER — HYDROCODONE BITARTRATE AND ACETAMINOPHEN 5; 325 MG/1; MG/1
TABLET ORAL
Qty: 60 TABLET | Refills: 0 | Status: SHIPPED | OUTPATIENT
Start: 2018-07-06 | End: 2018-07-24

## 2018-07-06 RX ORDER — METOPROLOL TARTRATE 25 MG/1
TABLET, FILM COATED ORAL
Qty: 270 TABLET | Refills: 1 | Status: SHIPPED | OUTPATIENT
Start: 2018-07-06 | End: 2018-12-25

## 2018-07-12 ENCOUNTER — TELEPHONE (OUTPATIENT)
Dept: FAMILY MEDICINE | Facility: CLINIC | Age: 83
End: 2018-07-12

## 2018-07-12 NOTE — TELEPHONE ENCOUNTER
Patient is due for a PHQ-9.  Index start date:6/05/2018  Index end date:10/05/2018    Please call patient. Pt is active on [employer]. I have sent a PHQ-9 via [employer] and will postpone encounter. Lily Jenkins CMA ([facility])

## 2018-07-13 NOTE — TELEPHONE ENCOUNTER
Pt completed PHQ-9. I have replied to pt informing her to schedule a follow-up visit in the near future.    PHQ-9 SCORE 7/13/2018   Total Score -   Total Score MyChart 7 (Mild depression)   Total Score 7     Lily Jenkins CMA ([facility])

## 2018-07-24 DIAGNOSIS — M35.3 POLYMYALGIA RHEUMATICA (H): ICD-10-CM

## 2018-07-24 NOTE — TELEPHONE ENCOUNTER
Norco 5-325 MG       Last Written Prescription Date:  7/6/18  Last Fill Quantity: 60,   # refills: 0  Last Office Visit: 2/5/18  Future Office visit:       Routing refill request to provider for review/approval because:  Drug not on the [facility], [facility] or [facility] refill protocol or controlled substance

## 2018-07-25 RX ORDER — HYDROCODONE BITARTRATE AND ACETAMINOPHEN 5; 325 MG/1; MG/1
TABLET ORAL
Qty: 60 TABLET | Refills: 0 | Status: SHIPPED | OUTPATIENT
Start: 2018-07-25 | End: 2018-08-14

## 2018-08-14 DIAGNOSIS — M35.3 POLYMYALGIA RHEUMATICA (H): ICD-10-CM

## 2018-08-14 NOTE — TELEPHONE ENCOUNTER
NORCO 5-325mg       Last Written Prescription Date:  07/25/2018  Last Fill Quantity: 60,   # refills: 0  Last Office Visit: 02/05/2018  Future Office visit:       Routing refill request to provider for review/approval because:  Drug not on the [facility], [facility] or [facility] refill protocol or controlled substance

## 2018-08-15 RX ORDER — HYDROCODONE BITARTRATE AND ACETAMINOPHEN 5; 325 MG/1; MG/1
TABLET ORAL
Qty: 60 TABLET | Refills: 0 | Status: SHIPPED | OUTPATIENT
Start: 2018-08-15 | End: 2018-09-04

## 2018-09-04 DIAGNOSIS — M35.3 POLYMYALGIA RHEUMATICA (H): ICD-10-CM

## 2018-09-04 NOTE — TELEPHONE ENCOUNTER
Candaceco       Last Written Prescription Date:  8/15/18  Last Fill Quantity: 60,   # refills: 0  Last Office Visit: 2/5/18  Future Office visit:       Routing refill request to provider for review/approval because:  Drug not on the [facility], [facility] or [facility] refill protocol or controlled substance

## 2018-09-05 RX ORDER — HYDROCODONE BITARTRATE AND ACETAMINOPHEN 5; 325 MG/1; MG/1
TABLET ORAL
Qty: 60 TABLET | Refills: 0 | Status: SHIPPED | OUTPATIENT
Start: 2018-09-05 | End: 2018-09-21

## 2018-09-10 DIAGNOSIS — K21.00 GASTROESOPHAGEAL REFLUX DISEASE WITH ESOPHAGITIS: ICD-10-CM

## 2018-09-10 NOTE — TELEPHONE ENCOUNTER
"Requested Prescriptions   Pending Prescriptions Disp Refills     ranitidine (ZANTAC) 300 MG tablet [Pharmacy Med Name: RANITIDINE HCL 300MG TABS] 90 tablet 2    Last Written Prescription Date:  12/06/2017  Last Fill Quantity: 90,  # refills: 2   Last office visit: 2/5/2018 with prescribing provider:  02/05/2018-Kim   Future Office Visit:     Sig: TAKE ONE TABLET BY MOUTH EVERY DAY    H2 Blockers Protocol Passed    9/10/2018 11:39 AM       Passed - Patient is age 12 or older       Passed - Recent (12 mo) or future (30 days) visit within the authorizing provider's specialty    Patient had office visit in the last 12 months or has a visit in the next 30 days with authorizing provider or within the authorizing provider's specialty.  See \"Patient Info\" tab in inbasket, or \"Choose Columns\" in Meds & Orders section of the refill encounter.              "

## 2018-09-21 DIAGNOSIS — M35.3 POLYMYALGIA RHEUMATICA (H): ICD-10-CM

## 2018-09-21 NOTE — TELEPHONE ENCOUNTER
Candaceco      Last Written Prescription Date:  9/05/2018  Last Fill Quantity: 60,   # refills: 0  Last Office Visit: 2/05/2018  Future Office visit:       Routing refill request to provider for review/approval because:  Drug not on the [facility], [facility] or [facility] refill protocol or controlled substance

## 2018-09-24 RX ORDER — HYDROCODONE BITARTRATE AND ACETAMINOPHEN 5; 325 MG/1; MG/1
TABLET ORAL
Qty: 60 TABLET | Refills: 0 | Status: SHIPPED | OUTPATIENT
Start: 2018-09-24 | End: 2018-10-08

## 2018-10-01 DIAGNOSIS — Z91.81 RISK FOR FALLS: ICD-10-CM

## 2018-10-01 DIAGNOSIS — N39.0 CHRONIC UTI: ICD-10-CM

## 2018-10-02 NOTE — TELEPHONE ENCOUNTER
trimethoprim  Last Written Prescription Date:  06/01/2018  Last Fill Quantity: 30,  # refills: 1   Last office visit: 2/5/2018 with prescribing provider:  Judy   Future Office Visit:      Requested Prescriptions   Pending Prescriptions Disp Refills     trimethoprim (TRIMPEX) 100 MG tablet [Pharmacy Med Name: TRIMETHOPRIM 100MG TABS] 30 tablet 1     Sig: TAKE 1/2 TABLET BY MOUTH DAILY    There is no refill protocol information for this order          Routing refill request to provider for review/approval because:  Drug not on the [facility] refill protocol  Katie Angeles RN on 10/2/2018 at 12:43 PM

## 2018-10-03 RX ORDER — TRIMETHOPRIM 100 MG/1
TABLET ORAL
Qty: 30 TABLET | Refills: 4 | Status: SHIPPED | OUTPATIENT
Start: 2018-10-03 | End: 2019-04-19

## 2018-10-08 DIAGNOSIS — M35.3 POLYMYALGIA RHEUMATICA (H): ICD-10-CM

## 2018-10-08 RX ORDER — HYDROCODONE BITARTRATE AND ACETAMINOPHEN 5; 325 MG/1; MG/1
TABLET ORAL
Qty: 60 TABLET | Refills: 0 | Status: SHIPPED | OUTPATIENT
Start: 2018-10-08 | End: 2018-10-29

## 2018-10-08 NOTE — TELEPHONE ENCOUNTER
Norco   5-325 MG     Last Written Prescription Date:  9-24-18  Last Fill Quantity: 60,   # refills: 0  Last Office Visit: 2/5/18  Future Office visit:       Routing refill request to provider for review/approval because:  Drug not on the [facility], [facility] or [facility] refill protocol or controlled substance

## 2018-10-26 ENCOUNTER — OFFICE VISIT (OUTPATIENT)
Dept: FAMILY MEDICINE | Facility: CLINIC | Age: 83
End: 2018-10-26
Payer: MEDICARE

## 2018-10-26 VITALS
RESPIRATION RATE: 16 BRPM | SYSTOLIC BLOOD PRESSURE: 110 MMHG | HEART RATE: 60 BPM | OXYGEN SATURATION: 100 % | DIASTOLIC BLOOD PRESSURE: 54 MMHG | TEMPERATURE: 97.6 F

## 2018-10-26 DIAGNOSIS — R30.0 DYSURIA: Primary | ICD-10-CM

## 2018-10-26 DIAGNOSIS — F03.91 DEMENTIA WITH BEHAVIORAL DISTURBANCE, UNSPECIFIED DEMENTIA TYPE: ICD-10-CM

## 2018-10-26 DIAGNOSIS — N39.0 URINARY TRACT INFECTION WITHOUT HEMATURIA, SITE UNSPECIFIED: ICD-10-CM

## 2018-10-26 DIAGNOSIS — Z23 NEED FOR PROPHYLACTIC VACCINATION AND INOCULATION AGAINST INFLUENZA: ICD-10-CM

## 2018-10-26 LAB
ALBUMIN UR-MCNC: NEGATIVE MG/DL
APPEARANCE UR: ABNORMAL
BACTERIA #/AREA URNS HPF: ABNORMAL /HPF
BILIRUB UR QL STRIP: NEGATIVE
COLOR UR AUTO: YELLOW
GLUCOSE UR STRIP-MCNC: NEGATIVE MG/DL
HGB UR QL STRIP: NEGATIVE
HYALINE CASTS #/AREA URNS LPF: 3 /LPF (ref 0–2)
KETONES UR STRIP-MCNC: NEGATIVE MG/DL
LEUKOCYTE ESTERASE UR QL STRIP: ABNORMAL
MUCOUS THREADS #/AREA URNS LPF: PRESENT /LPF
NITRATE UR QL: POSITIVE
PH UR STRIP: 5 PH (ref 5–7)
RBC #/AREA URNS AUTO: 2 /HPF (ref 0–2)
SOURCE: ABNORMAL
SP GR UR STRIP: 1.02 (ref 1–1.03)
UROBILINOGEN UR STRIP-MCNC: 0 MG/DL (ref 0–2)
WBC #/AREA URNS AUTO: 139 /HPF (ref 0–5)
WBC CLUMPS #/AREA URNS HPF: PRESENT /HPF

## 2018-10-26 PROCEDURE — G0008 ADMIN INFLUENZA VIRUS VAC: HCPCS | Performed by: OBSTETRICS & GYNECOLOGY

## 2018-10-26 PROCEDURE — 81001 URINALYSIS AUTO W/SCOPE: CPT | Performed by: OBSTETRICS & GYNECOLOGY

## 2018-10-26 PROCEDURE — 87186 SC STD MICRODIL/AGAR DIL: CPT | Performed by: OBSTETRICS & GYNECOLOGY

## 2018-10-26 PROCEDURE — 87086 URINE CULTURE/COLONY COUNT: CPT | Performed by: OBSTETRICS & GYNECOLOGY

## 2018-10-26 PROCEDURE — 87088 URINE BACTERIA CULTURE: CPT | Performed by: OBSTETRICS & GYNECOLOGY

## 2018-10-26 PROCEDURE — 99213 OFFICE O/P EST LOW 20 MIN: CPT | Mod: 25 | Performed by: OBSTETRICS & GYNECOLOGY

## 2018-10-26 PROCEDURE — 90662 IIV NO PRSV INCREASED AG IM: CPT | Performed by: OBSTETRICS & GYNECOLOGY

## 2018-10-26 RX ORDER — NITROFURANTOIN 25; 75 MG/1; MG/1
100 CAPSULE ORAL 2 TIMES DAILY
Qty: 14 CAPSULE | Refills: 0 | Status: SHIPPED | OUTPATIENT
Start: 2018-10-26 | End: 2018-11-02

## 2018-10-26 NOTE — NURSING NOTE
Prior to injection verified patient identity using patient's name and date of birth.  Due to injection administration, patient instructed to remain in clinic for 15 minutes  afterwards, and to report any adverse reaction to me immediately.    Emily Kimbrough CMA

## 2018-10-26 NOTE — MR AVS SNAPSHOT
After Visit Summary   10/26/2018    Anaid Dockery    MRN: [id number]           Patient Information     Date Of Birth          2/1/1930        Visit Information        Provider Department      10/26/2018 10:00 AM Devin Foreman MD [facility]        Today's Diagnoses     Dysuria    -  1    Need for prophylactic vaccination and inoculation against influenza        Urinary tract infection without hematuria, site unspecified           Follow-ups after your visit        Follow-up notes from your care team     Return in about 1 week (around 11/2/2018) for Routine Visit.      Your next 10 appointments already scheduled     Nov 02, 2018  1:45 PM CDT   PHYSICAL with Tai Kim MD   [facility]    [address] [place] [id number] [phone]              Who to contact     If you have questions or need follow up information about today's clinic visit or your schedule please contact [facility] directly at [phone].  Normal or non-critical lab and imaging results will be communicated to you by Galeneahart, letter or phone within 4 business days after the clinic has received the results. If you do not hear from us within 7 days, please contact the clinic through [employer] or phone. If you have a critical or abnormal lab result, we will notify you by phone as soon as possible.  Submit refill requests through [employer] or call your pharmacy and they will forward the refill request to us. Please allow 3 business days for your refill to be completed.          Additional Information About Your Visit        MyChart Information     [employer] gives you secure access to your electronic health record. If you see a primary care provider, you can also send messages to your care team and make appointments. If you have questions, please call your primary care clinic.  If you do not have a primary care provider, please call  [phone] and they will assist you.        Care EveryWhere ID     This is your Care EveryWhere ID. This could be used by other organizations to access your [place] medical records  [id number]        Your Vitals Were     Pulse Temperature Respirations Pulse Oximetry Breastfeeding?       60 97.6  F (36.4  C) (Temporal) 16 100% No        Blood Pressure from Last 3 Encounters:   10/26/18 110/54   02/05/18 116/70   01/16/18 150/78    Weight from Last 3 Encounters:   02/05/18 158 lb (71.7 kg)   08/21/17 (P) 163 lb (73.9 kg)   02/13/17 184 lb 1.4 oz (83.5 kg)              We Performed the Following     *UA reflex to Microscopic and Culture ([place]; [facility]; [facility]; [facility]; [facility]; [facility]; [place]; [place])     FLU VACCINE, INCREASED ANTIGEN, PRESV FREE, AGE 65+ [13317]     Urine Culture Aerobic Bacterial     Vaccine Administration, Initial [[address]]          Today's Medication Changes          These changes are accurate as of 10/26/18 11:22 AM.  If you have any questions, ask your nurse or doctor.               Start taking these medicines.        Dose/Directions    nitroFURantoin (macrocrystal-monohydrate) 100 MG capsule   Commonly known as:  MACROBID   Used for:  Urinary tract infection without hematuria, site unspecified   Started by:  Devin Foreman MD        Dose:  100 mg   Take 1 capsule (100 mg) by mouth 2 times daily   Quantity:  14 capsule   Refills:  0            Where to get your medicines      These medications were sent to [facility] [place] [facility] [address] [facility]   [address] [facility] [address]     Phone:  [phone]     nitroFURantoin (macrocrystal-monohydrate) 100 MG capsule                Primary Care Provider Office Phone # Fax #    Tai Nick Kim [phone]       [address] [address]        Equal Access to Services     JAMEL MARTÍNEZ AH: marita Murillo  maksimwilbur duane ramos. So [facility] [phone].    ATENCIÓN: Si bridgette ramirez, tiene a bennett disposición servicios gratuitos de asistencia lingüística. Juan al [phone].    We comply with applicable federal civil rights laws and [place] laws. We do not discriminate on the basis of race, color, national origin, age, disability, sex, sexual orientation, or gender identity.            Thank you!     Thank you for choosing [facility]  for your care. Our goal is always to provide you with excellent care. Hearing back from our patients is one way we can continue to improve our services. Please take a few minutes to complete the written survey that you may receive in the mail after your visit with us. Thank you!             Your Updated Medication List - Protect others around you: Learn how to safely use, store and throw away your medicines at www.disposemymeds.org.          This list is accurate as of 10/26/18 11:22 AM.  Always use your most recent med list.                   Brand Name Dispense Instructions for use Diagnosis    ACTAMIN MAXIMUM STRENGTH OR      Take 500 mg by mouth every 6 hours. Every 4-6 hours for pain        aspirin 81 MG tablet     100 tablet    ONE DAILY    Essential hypertension with goal blood pressure less than 140/90       CALCIUM 500 + D 500-125 MG-UNIT Tabs   Generic drug:  Calcium Carbonate-Vitamin D      1 A DAY    Osteoporosis, unspecified       diclofenac 50 MG EC tablet    VOLTAREN    180 tablet    TAKE ONE TABLET BY MOUTH TWICE A DAY    CKD (chronic kidney disease) stage 3, GFR 30-59 ml/min (H), Essential hypertension with goal blood pressure less than 140/90       donepezil 10 MG tablet    ARICEPT    90 tablet    TAKE ONE TABLET BY MOUTH EVERY NIGHT AT BEDTIME    Dementia without behavioral disturbance, unspecified dementia type       FLUoxetine 20 MG capsule    PROzac    90 capsule    TAKE ONE CAPSULE BY MOUTH  EVERY DAY    Major depressive disorder, recurrent episode, mild (H)       HYDROcodone-acetaminophen 5-325 MG per tablet    NORCO    60 tablet    TAKE 1 TABLET BY ORAL ROUTE EVERY 6 HOURS AS NEEDED FOR PAIN    Polymyalgia rheumatica (H)       hydrocortisone-pramoxine rectal foam    PROCTOFOAM HC    10 g    Place rectally 2 times daily    External hemorrhoids       levocetirizine 5 MG tablet    XYZAL    30 tablet    TAKE ONE TABLET BY MOUTH EVERY EVENING    Seasonal allergic rhinitis       levothyroxine 100 MCG tablet    SYNTHROID/LEVOTHROID    90 tablet    Take 1 tablet (100 mcg) by mouth daily    Hypothyroidism, unspecified type       lidocaine 5 % ointment    XYLOCAINE    50 g    Apply topically 2 times daily To affected areas.    Pain of left upper extremity       metoprolol tartrate 25 MG tablet    LOPRESSOR    270 tablet    TAKE ONE TABLET BY MOUTH EVERY MORNING AND TWO TABLETS EVERY EVENING    Hypertension goal BP (blood pressure) < 140/90       Multi-vitamin Tabs tablet   Generic drug:  multivitamin, therapeutic with minerals     120    1 tablet daily        mupirocin 2 % ointment    BACTROBAN    22 g    Apply topically 3 times daily    Cellulitis of face       nitroFURantoin (macrocrystal-monohydrate) 100 MG capsule    MACROBID    14 capsule    Take 1 capsule (100 mg) by mouth 2 times daily    Urinary tract infection without hematuria, site unspecified       order for DME     1 each    Equipment being ordered: Hospital Bed    Generalized muscle weakness, Chronic pain syndrome       QUEtiapine 25 MG tablet    SEROquel    225 tablet    TAKE ONE TABLET BY MOUTH AT BEDTIME AND TAKE 1 AND 1/2 TABLETS DAILY AS NEEDED FOR RESTLESSNESS/AGITATION    Dementia without behavioral disturbance, unspecified dementia type       ranitidine 300 MG tablet    ZANTAC    90 tablet    TAKE ONE TABLET BY MOUTH EVERY DAY    Gastroesophageal reflux disease with esophagitis       trimethoprim 100 MG tablet    TRIMPEX    30 tablet     TAKE 1/2 TABLET BY MOUTH DAILY    Risk for falls, Chronic UTI
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
## 2018-10-26 NOTE — PROGRESS NOTES

## 2018-10-27 LAB
BACTERIA SPEC CULT: ABNORMAL
Lab: ABNORMAL
SPECIMEN SOURCE: ABNORMAL

## 2018-10-29 DIAGNOSIS — M35.3 POLYMYALGIA RHEUMATICA (H): ICD-10-CM

## 2018-10-29 DIAGNOSIS — F33.0 MAJOR DEPRESSIVE DISORDER, RECURRENT EPISODE, MILD (H): ICD-10-CM

## 2018-10-29 DIAGNOSIS — F03.90 DEMENTIA WITHOUT BEHAVIORAL DISTURBANCE, UNSPECIFIED DEMENTIA TYPE: ICD-10-CM

## 2018-10-29 RX ORDER — HYDROCODONE BITARTRATE AND ACETAMINOPHEN 5; 325 MG/1; MG/1
TABLET ORAL
Qty: 60 TABLET | Refills: 0 | Status: SHIPPED | OUTPATIENT
Start: 2018-10-29 | End: 2018-11-19

## 2018-10-29 NOTE — TELEPHONE ENCOUNTER
Norco 5-325 MG       Last Written Prescription Date:  10/8/18  Last Fill Quantity: 60,   # refills: 0  Last Office Visit: 2/5/18  Future Office visit:    Next 5 appointments (look out 90 days)     Nov 02, 2018  1:45 PM CDT   PHYSICAL with Tai Kim MD   [facility]    [address] [place] [id number]   [phone]                   Routing refill request to provider for review/approval because:  Drug not on the [facility], [facility] or [facility] refill protocol or controlled substance

## 2018-10-29 NOTE — PROGRESS NOTES
Emily Please inform Anaid/ or caretaker  that this result(s) is/are showing that a urinary tract infection did grow out and it is sensitive to the antibiotic we gave her -please let us know if she has not improved -thanks. OLAF Foreman MD

## 2018-10-31 RX ORDER — DONEPEZIL HYDROCHLORIDE 10 MG/1
TABLET, FILM COATED ORAL
Qty: 90 TABLET | Refills: 1 | Status: SHIPPED | OUTPATIENT
Start: 2018-10-31 | End: 2019-04-24

## 2018-10-31 NOTE — TELEPHONE ENCOUNTER
"Prozac  Last Written Prescription Date:  10/3/17  Last Fill Quantity: 90,  # refills: 3   Last office visit: 10/26/2018 with prescribing provider:  2/5/18 with  Dr. Kim.   Future Office Visit:   Next 5 appointments (look out 90 days)     Nov 02, 2018  1:45 PM CDT   PHYSICAL with Tai Kim MD   [address] [address]   [phone]               Aricept  Last Written Prescription Date:  1/22/18  Last Fill Quantity: 90,  # refills: 2   Last office visit: 10/26/2018 with prescribing provider:  2/5/18 with Dr. Kim.    Future Office Visit:   Next 5 appointments (look out 90 days)     Nov 02, 2018  1:45 PM CDT   PHYSICAL with Tai Kim MD   [address] [address]   [phone]                 Requested Prescriptions   Pending Prescriptions Disp Refills     FLUoxetine (PROZAC) 20 MG capsule [Pharmacy Med Name: FLUOXETINE HCL 20MG CAPS] 90 capsule 3     Sig: TAKE ONE CAPSULE BY MOUTH EVERY DAY    SSRIs Protocol Failed    10/29/2018  9:48 AM       Failed - PHQ-9 score less than 5 in past 6 months    Please review last PHQ-9 score.          Passed - Patient is age 18 or older       Passed - No active pregnancy on record       Passed - No positive pregnancy test in last 12 months       Passed - Recent (6 mo) or future (30 days) visit within the authorizing provider's specialty    Patient had office visit in the last 6 months or has a visit in the next 30 days with authorizing provider or within the authorizing provider's specialty.  See \"Patient Info\" tab in inbasket, or \"Choose Columns\" in Meds & Orders section of the refill encounter.            donepezil (ARICEPT) 10 MG tablet [Pharmacy Med Name: DONEPEZIL HCL 10MG TABS] 90 tablet 2     Sig: TAKE ONE TABLET BY MOUTH EVERY NIGHT AT BEDTIME    Miscellaneous Dementia Agents Passed    10/29/2018  " "9:48 AM       Passed - Recent (12 mo) or future (30 days) visit within the authorizing provider's specialty    Patient had office visit in the last 12 months or has a visit in the next 30 days with authorizing provider or within the authorizing provider's specialty.  See \"Patient Info\" tab in inbasket, or \"Choose Columns\" in Meds & Orders section of the refill encounter.             Passed - Patient is 18 years of age or older         Aricept Prescription approved per [facility] Refill Protocol.   Forwarding Prozac to Primary Care Provider as out of RN scope of practice when PHQ9 score is > 5.  .  RigobertoRN      "

## 2018-11-02 ENCOUNTER — OFFICE VISIT (OUTPATIENT)
Dept: FAMILY MEDICINE | Facility: CLINIC | Age: 83
End: 2018-11-02
Payer: MEDICARE

## 2018-11-02 VITALS
HEART RATE: 62 BPM | SYSTOLIC BLOOD PRESSURE: 112 MMHG | OXYGEN SATURATION: 97 % | DIASTOLIC BLOOD PRESSURE: 60 MMHG | TEMPERATURE: 97.3 F | RESPIRATION RATE: 16 BRPM

## 2018-11-02 DIAGNOSIS — Z00.00 MEDICARE ANNUAL WELLNESS VISIT, SUBSEQUENT: Primary | ICD-10-CM

## 2018-11-02 DIAGNOSIS — N39.0 URINARY TRACT INFECTION WITHOUT HEMATURIA, SITE UNSPECIFIED: ICD-10-CM

## 2018-11-02 DIAGNOSIS — F33.0 MAJOR DEPRESSIVE DISORDER, RECURRENT EPISODE, MILD (H): ICD-10-CM

## 2018-11-02 DIAGNOSIS — G30.1 LATE ONSET ALZHEIMER'S DISEASE WITH BEHAVIORAL DISTURBANCE (H): ICD-10-CM

## 2018-11-02 DIAGNOSIS — D64.9 ANEMIA, UNSPECIFIED TYPE: ICD-10-CM

## 2018-11-02 DIAGNOSIS — Z00.01 ENCOUNTER FOR ROUTINE ADULT HEALTH EXAMINATION WITH ABNORMAL FINDINGS: ICD-10-CM

## 2018-11-02 DIAGNOSIS — I10 ESSENTIAL HYPERTENSION WITH GOAL BLOOD PRESSURE LESS THAN 140/90: ICD-10-CM

## 2018-11-02 DIAGNOSIS — E03.9 HYPOTHYROIDISM, UNSPECIFIED TYPE: ICD-10-CM

## 2018-11-02 DIAGNOSIS — H61.23 BILATERAL IMPACTED CERUMEN: ICD-10-CM

## 2018-11-02 DIAGNOSIS — Z91.81 RISK FOR FALLS: ICD-10-CM

## 2018-11-02 DIAGNOSIS — F02.818 LATE ONSET ALZHEIMER'S DISEASE WITH BEHAVIORAL DISTURBANCE (H): ICD-10-CM

## 2018-11-02 DIAGNOSIS — D53.0 ANEMIA DUE TO PROTEIN DEFICIENCY: ICD-10-CM

## 2018-11-02 DIAGNOSIS — M62.81 GENERALIZED MUSCLE WEAKNESS: ICD-10-CM

## 2018-11-02 DIAGNOSIS — E87.6 HYPOPOTASSEMIA: ICD-10-CM

## 2018-11-02 DIAGNOSIS — Z87.440 H/O RECURRENT URINARY TRACT INFECTION: ICD-10-CM

## 2018-11-02 LAB
ANION GAP SERPL CALCULATED.3IONS-SCNC: 12 MMOL/L (ref 3–14)
BUN SERPL-MCNC: 27 MG/DL (ref 7–30)
CALCIUM SERPL-MCNC: 8.5 MG/DL (ref 8.5–10.1)
CHLORIDE SERPL-SCNC: 105 MMOL/L (ref 94–109)
CO2 SERPL-SCNC: 21 MMOL/L (ref 20–32)
CREAT SERPL-MCNC: 1.11 MG/DL (ref 0.52–1.04)
ERYTHROCYTE [DISTWIDTH] IN BLOOD BY AUTOMATED COUNT: 14.3 % (ref 10–15)
GFR SERPL CREATININE-BSD FRML MDRD: 46 ML/MIN/1.7M2
GLUCOSE SERPL-MCNC: 110 MG/DL (ref 70–99)
HCT VFR BLD AUTO: 33.7 % (ref 35–47)
HGB BLD-MCNC: 10.2 G/DL (ref 11.7–15.7)
MCH RBC QN AUTO: 25.4 PG (ref 26.5–33)
MCHC RBC AUTO-ENTMCNC: 30.3 G/DL (ref 31.5–36.5)
MCV RBC AUTO: 84 FL (ref 78–100)
PLATELET # BLD AUTO: 383 10E9/L (ref 150–450)
POTASSIUM SERPL-SCNC: 4.1 MMOL/L (ref 3.4–5.3)
RBC # BLD AUTO: 4.02 10E12/L (ref 3.8–5.2)
SODIUM SERPL-SCNC: 138 MMOL/L (ref 133–144)
TSH SERPL DL<=0.005 MIU/L-ACNC: 0.61 MU/L (ref 0.4–4)
WBC # BLD AUTO: 10.5 10E9/L (ref 4–11)

## 2018-11-02 PROCEDURE — 84443 ASSAY THYROID STIM HORMONE: CPT | Performed by: FAMILY MEDICINE

## 2018-11-02 PROCEDURE — 80048 BASIC METABOLIC PNL TOTAL CA: CPT | Performed by: FAMILY MEDICINE

## 2018-11-02 PROCEDURE — 99213 OFFICE O/P EST LOW 20 MIN: CPT | Mod: 25 | Performed by: FAMILY MEDICINE

## 2018-11-02 PROCEDURE — 36415 COLL VENOUS BLD VENIPUNCTURE: CPT | Performed by: FAMILY MEDICINE

## 2018-11-02 PROCEDURE — 85027 COMPLETE CBC AUTOMATED: CPT | Performed by: FAMILY MEDICINE

## 2018-11-02 PROCEDURE — 69210 REMOVE IMPACTED EAR WAX UNI: CPT | Performed by: FAMILY MEDICINE

## 2018-11-02 PROCEDURE — G0439 PPPS, SUBSEQ VISIT: HCPCS | Performed by: FAMILY MEDICINE

## 2018-11-02 RX ORDER — NITROFURANTOIN 25; 75 MG/1; MG/1
100 CAPSULE ORAL 2 TIMES DAILY
Qty: 14 CAPSULE | Refills: 6 | Status: SHIPPED | OUTPATIENT
Start: 2018-11-02 | End: 2019-02-09

## 2018-11-02 ASSESSMENT — PAIN SCALES - GENERAL: PAINLEVEL: SEVERE PAIN (7)

## 2018-11-02 NOTE — PATIENT INSTRUCTIONS
Preventive Health Recommendations    See your health care provider every year to    Review health changes.     Discuss preventive care.      Review your medicines if your doctor has prescribed any.      You no longer need a yearly Pap test unless you've had an abnormal Pap test in the past 10 years. If you have vaginal symptoms, such as bleeding or discharge, be sure to talk with your provider about a Pap test.      Every 1 to 2 years, have a mammogram.  If you are over 69, talk with your health care provider about whether or not you want to continue having screening mammograms.      Every 10 years, have a colonoscopy. Or, have a yearly FIT test (stool test). These exams will check for colon cancer.       Have a cholesterol test every 5 years, or more often if your doctor advises it.       Have a diabetes test (fasting glucose) every three years. If you are at risk for diabetes, you should have this test more often.       At age 65, have a bone density scan (DEXA) to check for osteoporosis (brittle bone disease).    Shots:    Get a flu shot each year.    Get a tetanus shot every 10 years.    Talk to your doctor about your pneumonia vaccines. There are now two you should receive - Pneumovax (PPSV 23) and Prevnar (PCV 13).    Talk to your pharmacist about the shingles vaccine.    Talk to your doctor about the hepatitis B vaccine.    Nutrition:     Eat at least 5 servings of fruits and vegetables each day.      Eat whole-grain bread, whole-wheat pasta and brown rice instead of white grains and rice.      Get adequate Calcium and Vitamin D.     Lifestyle    Exercise at least 150 minutes a week (30 minutes a day, 5 days a week). This will help you control your weight and prevent disease.      Limit alcohol to one drink per day.      No smoking.       Wear sunscreen to prevent skin cancer.       See your dentist twice a year for an exam and cleaning.      See your eye doctor every 1 to 2 years to screen for conditions  such as glaucoma, macular degeneration and cataracts.    Personalized Prevention Plan  You are due for the preventive services outlined below.  Your care team is available to assist you in scheduling these services.  If you have already completed any of these items, please share that information with your care team to update in your medical record.  Health Maintenance Due   Topic Date Due     URINE DRUG SCREEN Q1 YR  02/01/1945     Cholesterol Lab - yearly  10/27/2016     Microalbumin Lab - yearly  04/11/2017     FALL RISK ASSESSMENT  10/31/2017     Tetanus Vaccine - every 10 years  04/08/2018     Thyroid Function Lab (TSH) - yearly  08/21/2018   [facility] pharmacy for macrobid when symptoms   Use trimpex in meantime  All else the same and labs will be called tpo you

## 2018-11-02 NOTE — MR AVS SNAPSHOT
After Visit Summary   11/2/2018    Anaid Dockery    MRN: [id number]           Patient Information     Date Of Birth          2/1/1930        Visit Information        Provider Department      11/2/2018 1:45 PM Tai Kim MD [facility]        Today's Diagnoses     Medicare annual wellness visit, subsequent    -  1    Encounter for routine adult health examination with abnormal findings        Hypothyroidism, unspecified type        Hypopotassemia        Essential hypertension with goal blood pressure less than 140/90        Major depressive disorder, recurrent episode, mild (H)        Anemia, unspecified type        Generalized muscle weakness        Risk for falls        Late onset Alzheimer's disease with behavioral disturbance        H/O recurrent urinary tract infection        Urinary tract infection without hematuria, site unspecified          Care Instructions      Preventive Health Recommendations    See your health care provider every year to    Review health changes.     Discuss preventive care.      Review your medicines if your doctor has prescribed any.      You no longer need a yearly Pap test unless you've had an abnormal Pap test in the past 10 years. If you have vaginal symptoms, such as bleeding or discharge, be sure to talk with your provider about a Pap test.      Every 1 to 2 years, have a mammogram.  If you are over 69, talk with your health care provider about whether or not you want to continue having screening mammograms.      Every 10 years, have a colonoscopy. Or, have a yearly FIT test (stool test). These exams will check for colon cancer.       Have a cholesterol test every 5 years, or more often if your doctor advises it.       Have a diabetes test (fasting glucose) every three years. If you are at risk for diabetes, you should have this test more often.       At age 65, have a bone density scan (DEXA) to check for osteoporosis (brittle bone  disease).    Shots:    Get a flu shot each year.    Get a tetanus shot every 10 years.    Talk to your doctor about your pneumonia vaccines. There are now two you should receive - Pneumovax (PPSV 23) and Prevnar (PCV 13).    Talk to your pharmacist about the shingles vaccine.    Talk to your doctor about the hepatitis B vaccine.    Nutrition:     Eat at least 5 servings of fruits and vegetables each day.      Eat whole-grain bread, whole-wheat pasta and brown rice instead of white grains and rice.      Get adequate Calcium and Vitamin D.     Lifestyle    Exercise at least 150 minutes a week (30 minutes a day, 5 days a week). This will help you control your weight and prevent disease.      Limit alcohol to one drink per day.      No smoking.       Wear sunscreen to prevent skin cancer.       See your dentist twice a year for an exam and cleaning.      See your eye doctor every 1 to 2 years to screen for conditions such as glaucoma, macular degeneration and cataracts.    Personalized Prevention Plan  You are due for the preventive services outlined below.  Your care team is available to assist you in scheduling these services.  If you have already completed any of these items, please share that information with your care team to update in your medical record.  Health Maintenance Due   Topic Date Due     URINE DRUG SCREEN Q1 YR  02/01/1945     Cholesterol Lab - yearly  10/27/2016     Microalbumin Lab - yearly  04/11/2017     FALL RISK ASSESSMENT  10/31/2017     Tetanus Vaccine - every 10 years  04/08/2018     Thyroid Function Lab (TSH) - yearly  08/21/2018   [facility] pharmacy for macrobid when symptoms   Use trimpex in meantime  All else the same and labs will be called tpo you          Follow-ups after your visit        Who to contact     If you have questions or need follow up information about today's clinic visit or your schedule please contact [facility] directly at [phone].  Normal or non-critical  lab and imaging results will be communicated to you by MyChart, letter or phone within 4 business days after the clinic has received the results. If you do not hear from us within 7 days, please contact the clinic through [employer] or phone. If you have a critical or abnormal lab result, we will notify you by phone as soon as possible.  Submit refill requests through [employer] or call your pharmacy and they will forward the refill request to us. Please allow 3 business days for your refill to be completed.          Additional Information About Your Visit        [employer] Information     [employer] gives you secure access to your electronic health record. If you see a primary care provider, you can also send messages to your care team and make appointments. If you have questions, please call your primary care clinic.  If you do not have a primary care provider, please call [phone] and they will assist you.        Care EveryWhere ID     This is your Care EveryWhere ID. This could be used by other organizations to access your [facility] medical records  [id number]        Your Vitals Were     Pulse Temperature Respirations Pulse Oximetry          62 97.3  F (36.3  C) (Temporal) 16 97%         Blood Pressure from Last 3 Encounters:   11/02/18 112/60   10/26/18 110/54   02/05/18 116/70    Weight from Last 3 Encounters:   02/05/18 158 lb (71.7 kg)   08/21/17 (P) 163 lb (73.9 kg)   02/13/17 184 lb 1.4 oz (83.5 kg)              We Performed the Following     Basic metabolic panel     CBC with platelets     TSH          Where to get your medicines      These medications were sent to [facility]  [address] [place] [address]     Phone:  [phone]     nitroFURantoin (macrocrystal-monohydrate) 100 MG capsule          Primary Care Provider Office Phone # Fax #    Tai Nick Kim [phone]       [address] [address]        Equal  Access to Services     [facility]: Hadii aad ku hadmarkheidi Asifali, wayennyda luqadaha, qaybta kaevelioduane alberto. So [facility] [phone].    ATENCIÓN: Si habla james, tiene a bennett disposición servicios gratuitos de asistencia lingüística. Llame al [phone].    We comply with applicable federal civil rights laws and [place] laws. We do not discriminate on the basis of race, color, national origin, age, disability, sex, sexual orientation, or gender identity.            Thank you!     Thank you for choosing [facility]  for your care. Our goal is always to provide you with excellent care. Hearing back from our patients is one way we can continue to improve our services. Please take a few minutes to complete the written survey that you may receive in the mail after your visit with us. Thank you!             Your Updated Medication List - Protect others around you: Learn how to safely use, store and throw away your medicines at www.disposemymeds.org.          This list is accurate as of 11/2/18  2:15 PM.  Always use your most recent med list.                   Brand Name Dispense Instructions for use Diagnosis    ACTAMIN MAXIMUM STRENGTH OR      Take 500 mg by mouth every 6 hours. Every 4-6 hours for pain        aspirin 81 MG tablet     100 tablet    ONE DAILY    Essential hypertension with goal blood pressure less than 140/90       CALCIUM 500 + D 500-125 MG-UNIT Tabs   Generic drug:  Calcium Carbonate-Vitamin D      1 A DAY    Osteoporosis, unspecified       diclofenac 50 MG EC tablet    VOLTAREN    180 tablet    TAKE ONE TABLET BY MOUTH TWICE A DAY    CKD (chronic kidney disease) stage 3, GFR 30-59 ml/min (H), Essential hypertension with goal blood pressure less than 140/90       donepezil 10 MG tablet    ARICEPT    90 tablet    TAKE ONE TABLET BY MOUTH EVERY NIGHT AT BEDTIME    Dementia without behavioral disturbance, unspecified dementia type        FLUoxetine 20 MG capsule    PROzac    90 capsule    TAKE ONE CAPSULE BY MOUTH EVERY DAY    Major depressive disorder, recurrent episode, mild (H)       HYDROcodone-acetaminophen 5-325 MG per tablet    NORCO    60 tablet    TAKE 1 TABLET BY ORAL ROUTE EVERY 6 HOURS AS NEEDED FOR PAIN    Polymyalgia rheumatica (H)       hydrocortisone-pramoxine rectal foam    PROCTOFOAM HC    10 g    Place rectally 2 times daily    External hemorrhoids       levocetirizine 5 MG tablet    XYZAL    30 tablet    TAKE ONE TABLET BY MOUTH EVERY EVENING    Seasonal allergic rhinitis       levothyroxine 100 MCG tablet    SYNTHROID/LEVOTHROID    90 tablet    Take 1 tablet (100 mcg) by mouth daily    Hypothyroidism, unspecified type       lidocaine 5 % ointment    XYLOCAINE    50 g    Apply topically 2 times daily To affected areas.    Pain of left upper extremity       metoprolol tartrate 25 MG tablet    LOPRESSOR    270 tablet    TAKE ONE TABLET BY MOUTH EVERY MORNING AND TWO TABLETS EVERY EVENING    Hypertension goal BP (blood pressure) < 140/90       Multi-vitamin Tabs tablet   Generic drug:  multivitamin, therapeutic with minerals     120    1 tablet daily        mupirocin 2 % ointment    BACTROBAN    22 g    Apply topically 3 times daily    Cellulitis of face       nitroFURantoin (macrocrystal-monohydrate) 100 MG capsule    MACROBID    14 capsule    Take 1 capsule (100 mg) by mouth 2 times daily    Urinary tract infection without hematuria, site unspecified       order for DME     1 each    Equipment being ordered: Hospital Bed    Generalized muscle weakness, Chronic pain syndrome       QUEtiapine 25 MG tablet    SEROquel    225 tablet    TAKE ONE TABLET BY MOUTH AT BEDTIME AND TAKE 1 AND 1/2 TABLETS DAILY AS NEEDED FOR RESTLESSNESS/AGITATION    Dementia without behavioral disturbance, unspecified dementia type       ranitidine 300 MG tablet    ZANTAC    90 tablet    TAKE ONE TABLET BY MOUTH EVERY DAY    Gastroesophageal reflux disease  with esophagitis       trimethoprim 100 MG tablet    TRIMPEX    30 tablet    TAKE 1/2 TABLET BY MOUTH DAILY    Risk for falls, Chronic UTI

## 2018-11-02 NOTE — PROGRESS NOTES
"  SUBJECTIVE:   Anaid Dockery is a 88 year old female who presents for Preventive Visit.      Are you in the first 12 months of your Medicare Part B coverage?  No    Physical Health:    In general, how would you rate your overall physical health? fair    Outside of work, how many days during the week do you exercise? none    Outside of work, approximately how many minutes a day do you exercise?not applicable    If you drink alcohol do you typically have >3 drinks per day or >7 drinks per week? No    Do you usually eat at least 4 servings of fruit and vegetables a day, include whole grains & fiber and avoid regularly eating high fat or \"junk\" foods? Yes    Do you have any problems taking medications regularly?  No    Do you have any side effects from medications? none    Needs assistance for the following daily activities: telephone use, transportation, shopping, preparing meals, housework, bathing, laundry, money management and taking medicine    Which of the following safety concerns are present in your home?:  none identified     Hearing impairment: Yes, Difficulty following a conversation in a noisy restaurant or crowded room.    Feel that people are mumbling or not speaking clearly.    Difficult to understand a speaker at a public meeting or Spiritism service.    Need to ask people to speak up or repeat themselves.    Find that men's voices are easier to understand than women's.    Difficulty understanding soft or whispered speech.    Difficulty understanding speech on the telephone.    In the past 6 months, have you been bothered by leaking of urine? yes    Mental Health:    In general, how would you rate your overall mental or emotional health? fair  PHQ-2 Score:      Additional concerns to address?  YES    Fall risk:               COGNITIVE SCREENING:  Not appropriate due to known dementia        Discuss dementia    Reviewed and updated as needed this visit by clinical staff         Reviewed and updated " as needed this visit by Provider        Social History   Substance Use Topics     Smoking status: Never Smoker     Smokeless tobacco: Never Used      Comment: no smokers in the household     Alcohol use No                             Do you feel safe in your environment - Yes    Do you have a Health Care Directive?: Yes: Advance Directive has been received and scanned.    Current providers sharing in care for this patient include:   Patient Care Team:  Tai Kim MD as PCP - General  [facility], [place], [facility] ([facility] ([facility] ([place]))    The following health maintenance items are reviewed in Epic and correct as of today:  Health Maintenance   Topic Date Due     URINE DRUG SCREEN Q1 YR  02/01/1945     LIPID MONITORING Q1 YEAR  10/27/2016     MICROALBUMIN Q1 YEAR  04/11/2017     FALL RISK ASSESSMENT  10/31/2017     TETANUS IMMUNIZATION (SYSTEM ASSIGNED)  04/08/2018     TSH Q1 YEAR  08/21/2018     PHQ-9 Q6 MONTHS  01/12/2019     BMP Q1 YR  01/15/2019     DEPRESSION ACTION PLAN Q1 YR  02/05/2019     LILLIAN QUESTIONNAIRE 1 YEAR  07/12/2019     ADVANCE DIRECTIVE PLANNING Q5 YRS  02/11/2022     PNEUMOCOCCAL  Completed     INFLUENZA VACCINE  Completed     With patient's dementia, she is not officially oriented to to time and place.  She does recognize me as her physician     labs reviewed in EPIC  BP Readings from Last 3 Encounters:   11/02/18 112/60   10/26/18 110/54   02/05/18 116/70    Wt Readings from Last 3 Encounters:   02/05/18 158 lb (71.7 kg)   08/21/17 (P) 163 lb (73.9 kg)   02/13/17 184 lb 1.4 oz (83.5 kg)                        ROS:  Constitutional, HEENT, cardiovascular, pulmonary, gi and gu systems are negative, except as otherwise noted.  I asked patient a number of questions directly and she essentially answered negative to all of them.  In speaking with her daughter, daughter indicates urinary tract infections often lead to increased behavior problems and difficulty with her dementia.  Her  "urine often changes color when this happens.  She is wondering what we might do because it is very difficult for patient to get out of the house.  She requires numerous caregivers and she cannot be left alone much at all because her risk of falling and poor decision making and getting out of bed or chairs.  They do have a bed alarm.  Pain is controlled with current medications    OBJECTIVE:   /60  Pulse 62  Temp 97.3  F (36.3  C) (Temporal)  Resp 16  SpO2 97% Estimated body mass index is 26.29 kg/(m^2) as calculated from the following:    Height as of 9/7/16: 5' 5\" (1.651 m).    Weight as of 2/5/18: 158 lb (71.7 kg).  EXAM:   GENERAL: healthy, alert and no distress  EYES: Eyes grossly normal to inspection, PERRL and conjunctivae and sclerae normal  HENT: normal cephalic/atraumatic, right ear: occluded with wax and which was successfully flushed from the ear, left ear: occluded with wax and successfully removed with plastic curette, nose and mouth without ulcers or lesions, oropharynx clear and oral mucous membranes moist  NECK: no adenopathy, no asymmetry, masses, or scars and thyroid normal to palpation  RESP: lungs clear to auscultation - no rales, rhonchi or wheezes  CV: Regular with some murmur.  ABDOMEN: soft, nontender, no hepatosplenomegaly, no masses and bowel sounds normal  MS: no gross musculoskeletal defects noted, no edema  SKIN: no suspicious lesions or rashes  NEURO: Patient could not answer questions about current events etc.  She did answer appropriately yes or no.  Mostly she sat quietly and did not interact.  PSYCH: mentation appears normal, judgement and insight impaired, appearance well groomed and as noted quietly sat for the exam and followed commands    Diagnostic Test Results:  Results for orders placed or performed in visit on 11/02/18 (from the past 24 hour(s))   TSH   Result Value Ref Range    TSH 0.61 0.40 - 4.00 mU/L   Basic metabolic panel   Result Value Ref Range    Sodium " 138 133 - 144 mmol/L    Potassium 4.1 3.4 - 5.3 mmol/L    Chloride 105 94 - 109 mmol/L    Carbon Dioxide 21 20 - 32 mmol/L    Anion Gap 12 3 - 14 mmol/L    Glucose 110 (H) 70 - 99 mg/dL    Urea Nitrogen 27 7 - 30 mg/dL    Creatinine 1.11 (H) 0.52 - 1.04 mg/dL    GFR Estimate 46 (L) >60 mL/min/1.7m2    GFR Estimate If Black 56 (L) >60 mL/min/1.7m2    Calcium 8.5 8.5 - 10.1 mg/dL   CBC with platelets   Result Value Ref Range    WBC 10.5 4.0 - 11.0 10e9/L    RBC Count 4.02 3.8 - 5.2 10e12/L    Hemoglobin 10.2 (L) 11.7 - 15.7 g/dL    Hematocrit 33.7 (L) 35.0 - 47.0 %    MCV 84 78 - 100 fl    MCH 25.4 (L) 26.5 - 33.0 pg    MCHC 30.3 (L) 31.5 - 36.5 g/dL    RDW 14.3 10.0 - 15.0 %    Platelet Count 383 150 - 450 10e9/L     *Note: Due to a large number of results and/or encounters for the requested time period, some results have not been displayed. A complete set of results can be found in Results Review.       ASSESSMENT / PLAN:   1. Medicare annual wellness visit, subsequent  Given her dementia and multiple physical illnesses she currently looks well today    2. Encounter for routine adult health examination with abnormal findings    3. Hypothyroidism, unspecified type  Thyroid is normal  - TSH    4. Hypopotassemia  Potassium today is good.  - Basic metabolic panel    5. Essential hypertension with goal blood pressure less than 140/90  Uncontrolled hypertension  - Basic metabolic panel  - CBC with platelets    6. Major depressive disorder, recurrent episode, mild (H)  Does not appear depressed and medication seems to be working    7. Anemia, unspecified type  Probably nutritional.  Noted below    8. Generalized muscle weakness  Dated age.    9. Risk for falls  It should contributes and family is doing everything to protect her    10. Late onset Alzheimer's disease with behavioral disturbance  Pretty significant but not having hallucinations or other disruption if she is physically feeling well    11. H/O recurrent urinary  "tract infection  This usually causes issues with behavior.  I have chosen to have antibiotics available to family if she starts having foul-smelling urine and control issues.  Continue Trimpex to avoid    12. Urinary tract infection without hematuria, site unspecified  As above  - nitroFURantoin, macrocrystal-monohydrate, (MACROBID) 100 MG capsule; Take 1 capsule (100 mg) by mouth 2 times daily  Dispense: 14 capsule; Refill: 6    13. Anemia due to protein deficiency  As above.  Diet is okay I suspect she just has trouble absorbing iron      End of Life Planning:  Patient currently has an advanced directive: Uncertain in certainly family feels aggressive intervention is not appropriate based on previous discussions    COUNSELING:      BP Readings from Last 1 Encounters:   10/26/18 110/54     Estimated body mass index is 26.29 kg/(m^2) as calculated from the following:    Height as of 9/7/16: 5' 5\" (1.651 m).    Weight as of 2/5/18: 158 lb (71.7 kg).      Weight management plan: Discussed healthy diet and exercise guidelines and patient will follow up in 12 months in clinic to re-evaluate.     reports that she has never smoked. She has never used smokeless tobacco.      Appropriate preventive services were discussed with this patient, including applicable screening as appropriate for cardiovascular disease, diabetes, osteopenia/osteoporosis, and glaucoma.  As appropriate for age/gender, discussed screening for colorectal cancer, prostate cancer, breast cancer, and cervical cancer. Checklist reviewing preventive services available has been given to the patient.    Reviewed patients plan of care and provided an AVS. The Intermediate Care Plan ( asthma action plan, low back pain action plan, and migraine action plan) for Anaid meets the Care Plan requirement. This Care Plan has been established and reviewed with the Patient and daughter.    Counseling Resources:  ATP IV Guidelines  Pooled Cohorts Equation " Calculator  Breast Cancer Risk Calculator  FRAX Risk Assessment  ICSI Preventive Guidelines  Dietary Guidelines for Americans, 2010  [employer]'s MyPlate  ASA Prophylaxis  Lung CA Screening    Tai Nick Kim MD  [facility]

## 2018-11-19 DIAGNOSIS — M35.3 POLYMYALGIA RHEUMATICA (H): ICD-10-CM

## 2018-11-19 DIAGNOSIS — F03.90 DEMENTIA WITHOUT BEHAVIORAL DISTURBANCE, UNSPECIFIED DEMENTIA TYPE: ICD-10-CM

## 2018-11-20 NOTE — TELEPHONE ENCOUNTER
quetiapine  Last Written Prescription Date:  12/13/2017  Last Fill Quantity: 225,  # refills: 1   Last office visit: 11/2/2018 with prescribing provider:      Future Office Visit:      Requested Prescriptions   Pending Prescriptions Disp Refills     QUEtiapine (SEROQUEL) 25 MG tablet [Pharmacy Med Name: QUETIAPINE FUMARATE 25MG TABS] 225 tablet 1     Sig: TAKE ONE TABLET BY MOUTH AT BEDTIME AND TAKE 1 AND 1/2 TABLETS DAILY AS NEEDED FOR RESTLESSNESS/AGITATION    Antipsychotic Medications Failed    11/19/2018  1:14 PM       Failed - Lipid panel on file within the past 12 months    Recent Labs   Lab Test  10/27/15   0953   CHOL  238*   TRIG  293*   HDL  55   LDL  124   VLDL  59*   CHOLHDLRATIO  4.3              Passed - Blood pressure under 140/90 in past 12 months    BP Readings from Last 3 Encounters:   11/02/18 112/60   10/26/18 110/54   02/05/18 116/70          Passed - Patient is 12 years of age or older       Passed - CBC on file in past 12 months    Recent Labs   Lab Test  11/02/18   1440   WBC  10.5   RBC  4.02   HGB  10.2*   HCT  33.7*   PLT  383          Passed - Heart Rate on file within past 12 months    Pulse Readings from Last 3 Encounters:   11/02/18 62   10/26/18 60   02/05/18 80          Passed - A1c or Glucose on file in past 12 months    Recent Labs   Lab Test  11/02/18   1440   GLC  110*       Please review patients last 3 weights. If a weight gain of >10 lbs exists, you may refill the prescription once after instructing the patient to schedule an appointment within the next 30 days.    Wt Readings from Last 3 Encounters:   02/05/18 158 lb (71.7 kg)   08/21/17 (P) 163 lb (73.9 kg)   02/13/17 184 lb 1.4 oz (83.5 kg)            Passed - Patient is not pregnant       Passed - No positve pregnancy test on file in past 12 months       Passed - Recent (6 mo) or future (30 days) visit within the authorizing provider's specialty    Patient had office visit in the last 6 months or has a visit in the next 30  "days with authorizing provider or within the authorizing provider's specialty.  See \"Patient Info\" tab in inbasket, or \"Choose Columns\" in Meds & Orders section of the refill encounter.              Routing refill request to provider for review/approval because:  Labs not current:  Needs Lipids  Katie Angeles RN on 11/20/2018 at 4:32 PM    "

## 2018-11-21 RX ORDER — QUETIAPINE FUMARATE 25 MG/1
TABLET, FILM COATED ORAL
Qty: 225 TABLET | Refills: 1 | Status: SHIPPED | OUTPATIENT
Start: 2018-11-21

## 2018-11-26 RX ORDER — HYDROCODONE BITARTRATE AND ACETAMINOPHEN 5; 325 MG/1; MG/1
TABLET ORAL
Qty: 60 TABLET | Refills: 0 | Status: SHIPPED | OUTPATIENT
Start: 2018-11-26 | End: 2018-12-12

## 2018-11-26 NOTE — TELEPHONE ENCOUNTER
Hydrocodone      Last Written Prescription Date:  10/29/18  Last Fill Quantity: 60,   # refills: 0  Last Office Visit: 11/02/18  Future Office visit:       Routing refill request to provider for review/approval because:  Drug not on the [facility], P or [facility] refill protocol or controlled substance

## 2018-11-26 NOTE — TELEPHONE ENCOUNTER
Calling to follow up on RX, patient will be out of meds today.  Thank you,  Alejandra Glez  Patient Representative

## 2018-12-10 DIAGNOSIS — K21.00 GASTROESOPHAGEAL REFLUX DISEASE WITH ESOPHAGITIS: ICD-10-CM

## 2018-12-10 DIAGNOSIS — M35.3 POLYMYALGIA RHEUMATICA (H): ICD-10-CM

## 2018-12-11 NOTE — TELEPHONE ENCOUNTER
Norco 5-325 MG       Last Written Prescription Date:  11/26/18  Last Fill Quantity: 60,   # refills: 0  Last Office Visit: 11/2/18  Future Office visit:       Routing refill request to provider for review/approval because:  Drug not on the [facility], [facility] or [facility] refill protocol or controlled substance

## 2018-12-11 NOTE — TELEPHONE ENCOUNTER
"Prescription approved per RN refill protocol.  Fara Duncan RN, BSN        Zantac  Last Written Prescription Date:  9/12/2018  Last Fill Quantity: 90,  # refills: 0   Last office visit: 11/2/2018 with prescribing provider:  11/2/2018   Future Office Visit:      Requested Prescriptions   Pending Prescriptions Disp Refills     ranitidine (ZANTAC) 300 MG tablet [Pharmacy Med Name: RANITIDINE HCL 300MG TABS] 90 tablet 0     Sig: TAKE ONE TABLET BY MOUTH EVERY DAY    H2 Blockers Protocol Passed - 12/10/2018 11:12 AM       Passed - Patient is age 12 or older       Passed - Recent (12 mo) or future (30 days) visit within the authorizing provider's specialty    Patient had office visit in the last 12 months or has a visit in the next 30 days with authorizing provider or within the authorizing provider's specialty.  See \"Patient Info\" tab in inbasket, or \"Choose Columns\" in Meds & Orders section of the refill encounter.              Fara Duncan RN on 12/11/2018 at 1:07 PM    "

## 2018-12-12 RX ORDER — HYDROCODONE BITARTRATE AND ACETAMINOPHEN 5; 325 MG/1; MG/1
TABLET ORAL
Qty: 60 TABLET | Refills: 0 | Status: SHIPPED | OUTPATIENT
Start: 2018-12-12 | End: 2018-12-31

## 2018-12-20 ENCOUNTER — HOSPITAL ENCOUNTER (OUTPATIENT)
Facility: CLINIC | Age: 83
Setting detail: OBSERVATION
Discharge: HOME | End: 2018-12-21
Attending: EMERGENCY MEDICINE | Admitting: PEDIATRICS
Payer: MEDICARE

## 2018-12-20 ENCOUNTER — APPOINTMENT (OUTPATIENT)
Dept: CT IMAGING | Facility: CLINIC | Age: 83
End: 2018-12-20
Attending: EMERGENCY MEDICINE
Payer: MEDICARE

## 2018-12-20 ENCOUNTER — APPOINTMENT (OUTPATIENT)
Dept: GENERAL RADIOLOGY | Facility: CLINIC | Age: 83
End: 2018-12-20
Attending: EMERGENCY MEDICINE
Payer: MEDICARE

## 2018-12-20 DIAGNOSIS — R40.0 SLEEPINESS: ICD-10-CM

## 2018-12-20 DIAGNOSIS — R40.4 UNRESPONSIVE EPISODE: ICD-10-CM

## 2018-12-20 DIAGNOSIS — R11.2 NON-INTRACTABLE VOMITING WITH NAUSEA, UNSPECIFIED VOMITING TYPE: ICD-10-CM

## 2018-12-20 DIAGNOSIS — I49.49 EXTRASYSTOLES: ICD-10-CM

## 2018-12-20 DIAGNOSIS — R06.89 ACUTE RESPIRATORY INSUFFICIENCY: ICD-10-CM

## 2018-12-20 DIAGNOSIS — R11.2 NAUSEA AND VOMITING, INTRACTABILITY OF VOMITING NOT SPECIFIED, UNSPECIFIED VOMITING TYPE: ICD-10-CM

## 2018-12-20 PROBLEM — R55 SYNCOPE: Status: ACTIVE | Noted: 2018-12-20

## 2018-12-20 LAB
ALBUMIN SERPL-MCNC: 3.9 G/DL (ref 3.4–5)
ALBUMIN UR-MCNC: 30 MG/DL
ALP SERPL-CCNC: 46 U/L (ref 40–150)
ALT SERPL W P-5'-P-CCNC: 10 U/L (ref 0–50)
ANION GAP SERPL CALCULATED.3IONS-SCNC: 11 MMOL/L (ref 3–14)
APPEARANCE UR: CLEAR
AST SERPL W P-5'-P-CCNC: 12 U/L (ref 0–45)
BASOPHILS # BLD AUTO: 0 10E9/L (ref 0–0.2)
BASOPHILS NFR BLD AUTO: 0.3 %
BILIRUB SERPL-MCNC: 0.2 MG/DL (ref 0.2–1.3)
BILIRUB UR QL STRIP: NEGATIVE
BUN SERPL-MCNC: 24 MG/DL (ref 7–30)
CALCIUM SERPL-MCNC: 8.5 MG/DL (ref 8.5–10.1)
CHLORIDE SERPL-SCNC: 105 MMOL/L (ref 94–109)
CO2 SERPL-SCNC: 23 MMOL/L (ref 20–32)
COLOR UR AUTO: YELLOW
CREAT SERPL-MCNC: 1.16 MG/DL (ref 0.52–1.04)
DIFFERENTIAL METHOD BLD: ABNORMAL
EOSINOPHIL NFR BLD AUTO: 4.4 %
ERYTHROCYTE [DISTWIDTH] IN BLOOD BY AUTOMATED COUNT: 13.4 % (ref 10–15)
GFR SERPL CREATININE-BSD FRML MDRD: 42 ML/MIN/{1.73_M2}
GLUCOSE SERPL-MCNC: 123 MG/DL (ref 70–99)
GLUCOSE UR STRIP-MCNC: NEGATIVE MG/DL
HCT VFR BLD AUTO: 31.8 % (ref 35–47)
HGB BLD-MCNC: 9.5 G/DL (ref 11.7–15.7)
HGB UR QL STRIP: NEGATIVE
HYALINE CASTS #/AREA URNS LPF: 1 /LPF (ref 0–2)
IMM GRANULOCYTES # BLD: 0 10E9/L (ref 0–0.4)
IMM GRANULOCYTES NFR BLD: 0.4 %
KETONES UR STRIP-MCNC: NEGATIVE MG/DL
LEUKOCYTE ESTERASE UR QL STRIP: NEGATIVE
LYMPHOCYTES # BLD AUTO: 4.9 10E9/L (ref 0.8–5.3)
LYMPHOCYTES NFR BLD AUTO: 46.9 %
MCH RBC QN AUTO: 25.2 PG (ref 26.5–33)
MCHC RBC AUTO-ENTMCNC: 29.9 G/DL (ref 31.5–36.5)
MCV RBC AUTO: 84 FL (ref 78–100)
MONOCYTES # BLD AUTO: 0.7 10E9/L (ref 0–1.3)
MONOCYTES NFR BLD AUTO: 6.2 %
MUCOUS THREADS #/AREA URNS LPF: PRESENT /LPF
NEUTROPHILS # BLD AUTO: 4.4 10E9/L (ref 1.6–8.3)
NEUTROPHILS NFR BLD AUTO: 41.8 %
NITRATE UR QL: NEGATIVE
NRBC # BLD AUTO: 0 10*3/UL
NRBC BLD AUTO-RTO: 0 /100
PH UR STRIP: 6 PH (ref 5–7)
PLATELET # BLD AUTO: 413 10E9/L (ref 150–450)
POTASSIUM SERPL-SCNC: 4.1 MMOL/L (ref 3.4–5.3)
PROT SERPL-MCNC: 7.6 G/DL (ref 6.8–8.8)
RBC # BLD AUTO: 3.77 10E12/L (ref 3.8–5.2)
RBC #/AREA URNS AUTO: 2 /HPF (ref 0–2)
SODIUM SERPL-SCNC: 139 MMOL/L (ref 133–144)
SOURCE: ABNORMAL
SP GR UR STRIP: 1.02 (ref 1–1.03)
SQUAMOUS #/AREA URNS AUTO: <1 /HPF (ref 0–1)
TROPONIN I SERPL-MCNC: <0.015 UG/L (ref 0–0.04)
UROBILINOGEN UR STRIP-MCNC: 0 MG/DL (ref 0–2)
WBC # BLD AUTO: 10.5 10E9/L (ref 4–11)
WBC #/AREA URNS AUTO: 3 /HPF (ref 0–5)

## 2018-12-20 PROCEDURE — 87086 URINE CULTURE/COLONY COUNT: CPT | Performed by: NURSE PRACTITIONER

## 2018-12-20 PROCEDURE — 85025 COMPLETE CBC W/AUTO DIFF WBC: CPT | Performed by: EMERGENCY MEDICINE

## 2018-12-20 PROCEDURE — 80053 COMPREHEN METABOLIC PANEL: CPT | Performed by: EMERGENCY MEDICINE

## 2018-12-20 PROCEDURE — 99207 ZZC CDG-HISTORY COMP: MEETS EXP. PROBLEM FOCUSED-DOWN CODED LACK OF ROS: CPT | Performed by: NURSE PRACTITIONER

## 2018-12-20 PROCEDURE — 99285 EMERGENCY DEPT VISIT HI MDM: CPT | Mod: 25 | Performed by: EMERGENCY MEDICINE

## 2018-12-20 PROCEDURE — 70450 CT HEAD/BRAIN W/O DYE: CPT

## 2018-12-20 PROCEDURE — 93010 ELECTROCARDIOGRAM REPORT: CPT | Mod: Z6 | Performed by: EMERGENCY MEDICINE

## 2018-12-20 PROCEDURE — 84484 ASSAY OF TROPONIN QUANT: CPT | Performed by: EMERGENCY MEDICINE

## 2018-12-20 PROCEDURE — 93005 ELECTROCARDIOGRAM TRACING: CPT | Mod: 77

## 2018-12-20 PROCEDURE — 40000274 ZZH STATISTIC RCP CONSULT EA 30 MIN

## 2018-12-20 PROCEDURE — A9270 NON-COVERED ITEM OR SERVICE: HCPCS | Mod: GY | Performed by: NURSE PRACTITIONER

## 2018-12-20 PROCEDURE — 25000132 ZZH RX MED GY IP 250 OP 250 PS 637: Mod: GY | Performed by: NURSE PRACTITIONER

## 2018-12-20 PROCEDURE — 93005 ELECTROCARDIOGRAM TRACING: CPT | Performed by: EMERGENCY MEDICINE

## 2018-12-20 PROCEDURE — 96374 THER/PROPH/DIAG INJ IV PUSH: CPT

## 2018-12-20 PROCEDURE — G0378 HOSPITAL OBSERVATION PER HR: HCPCS

## 2018-12-20 PROCEDURE — 81001 URINALYSIS AUTO W/SCOPE: CPT | Performed by: EMERGENCY MEDICINE

## 2018-12-20 PROCEDURE — 99218 ZZC INITIAL OBSERVATION CARE,LEVL I: CPT | Mod: AI | Performed by: NURSE PRACTITIONER

## 2018-12-20 PROCEDURE — 25000128 H RX IP 250 OP 636: Performed by: FAMILY MEDICINE

## 2018-12-20 PROCEDURE — 71045 X-RAY EXAM CHEST 1 VIEW: CPT | Mod: TC

## 2018-12-20 RX ORDER — ONDANSETRON 2 MG/ML
4 INJECTION INTRAMUSCULAR; INTRAVENOUS EVERY 6 HOURS PRN
Status: DISCONTINUED | OUTPATIENT
Start: 2018-12-20 | End: 2018-12-21 | Stop reason: HOSPADM

## 2018-12-20 RX ORDER — ONDANSETRON 4 MG/1
4 TABLET, ORALLY DISINTEGRATING ORAL EVERY 6 HOURS PRN
Status: DISCONTINUED | OUTPATIENT
Start: 2018-12-20 | End: 2018-12-21 | Stop reason: HOSPADM

## 2018-12-20 RX ORDER — NALOXONE HYDROCHLORIDE 0.4 MG/ML
.1-.4 INJECTION, SOLUTION INTRAMUSCULAR; INTRAVENOUS; SUBCUTANEOUS
Status: DISCONTINUED | OUTPATIENT
Start: 2018-12-20 | End: 2018-12-20

## 2018-12-20 RX ORDER — ACETAMINOPHEN 650 MG/1
650 SUPPOSITORY RECTAL EVERY 4 HOURS PRN
Status: DISCONTINUED | OUTPATIENT
Start: 2018-12-20 | End: 2018-12-21 | Stop reason: HOSPADM

## 2018-12-20 RX ORDER — BENZTROPINE MESYLATE 0.5 MG/1
1-2 TABLET ORAL 3 TIMES DAILY PRN
Status: DISCONTINUED | OUTPATIENT
Start: 2018-12-20 | End: 2018-12-21 | Stop reason: HOSPADM

## 2018-12-20 RX ORDER — ONDANSETRON 2 MG/ML
4 INJECTION INTRAMUSCULAR; INTRAVENOUS EVERY 6 HOURS PRN
Status: DISCONTINUED | OUTPATIENT
Start: 2018-12-20 | End: 2018-12-20

## 2018-12-20 RX ORDER — POLYETHYLENE GLYCOL 3350 17 G/17G
1 POWDER, FOR SOLUTION ORAL DAILY
COMMUNITY

## 2018-12-20 RX ORDER — ONDANSETRON 4 MG/1
4 TABLET, ORALLY DISINTEGRATING ORAL EVERY 6 HOURS PRN
Status: DISCONTINUED | OUTPATIENT
Start: 2018-12-20 | End: 2018-12-20

## 2018-12-20 RX ORDER — NAPROXEN 250 MG/1
250 TABLET ORAL 2 TIMES DAILY WITH MEALS
Status: DISCONTINUED | OUTPATIENT
Start: 2018-12-20 | End: 2018-12-21 | Stop reason: HOSPADM

## 2018-12-20 RX ORDER — DOCUSATE SODIUM 100 MG/1
100 CAPSULE, LIQUID FILLED ORAL 2 TIMES DAILY
Status: DISCONTINUED | OUTPATIENT
Start: 2018-12-20 | End: 2018-12-21 | Stop reason: HOSPADM

## 2018-12-20 RX ORDER — DOCUSATE SODIUM 100 MG/1
100 CAPSULE, LIQUID FILLED ORAL AT BEDTIME
COMMUNITY

## 2018-12-20 RX ORDER — QUETIAPINE FUMARATE 25 MG/1
25 TABLET, FILM COATED ORAL AT BEDTIME
Status: DISCONTINUED | OUTPATIENT
Start: 2018-12-20 | End: 2018-12-21 | Stop reason: HOSPADM

## 2018-12-20 RX ORDER — DONEPEZIL HYDROCHLORIDE 5 MG/1
10 TABLET, FILM COATED ORAL EVERY MORNING
Status: DISCONTINUED | OUTPATIENT
Start: 2018-12-21 | End: 2018-12-21 | Stop reason: HOSPADM

## 2018-12-20 RX ORDER — NALOXONE HYDROCHLORIDE 0.4 MG/ML
.1-.4 INJECTION, SOLUTION INTRAMUSCULAR; INTRAVENOUS; SUBCUTANEOUS
Status: DISCONTINUED | OUTPATIENT
Start: 2018-12-20 | End: 2018-12-21 | Stop reason: HOSPADM

## 2018-12-20 RX ORDER — HALOPERIDOL 5 MG/ML
1-2 INJECTION INTRAMUSCULAR EVERY 6 HOURS PRN
Status: DISCONTINUED | OUTPATIENT
Start: 2018-12-20 | End: 2018-12-20

## 2018-12-20 RX ORDER — HYDROCODONE BITARTRATE AND ACETAMINOPHEN 5; 325 MG/1; MG/1
1 TABLET ORAL EVERY 6 HOURS PRN
Status: DISCONTINUED | OUTPATIENT
Start: 2018-12-20 | End: 2018-12-21 | Stop reason: HOSPADM

## 2018-12-20 RX ORDER — ONDANSETRON 2 MG/ML
4 INJECTION INTRAMUSCULAR; INTRAVENOUS EVERY 30 MIN PRN
Status: DISCONTINUED | OUTPATIENT
Start: 2018-12-20 | End: 2018-12-20

## 2018-12-20 RX ORDER — ACETAMINOPHEN 325 MG/1
650 TABLET ORAL EVERY 4 HOURS PRN
Status: DISCONTINUED | OUTPATIENT
Start: 2018-12-20 | End: 2018-12-21 | Stop reason: HOSPADM

## 2018-12-20 RX ORDER — HALOPERIDOL 5 MG/ML
1-2 INJECTION INTRAMUSCULAR EVERY 6 HOURS PRN
Status: DISCONTINUED | OUTPATIENT
Start: 2018-12-20 | End: 2018-12-21 | Stop reason: HOSPADM

## 2018-12-20 RX ADMIN — HALOPERIDOL LACTATE 2 MG: 5 INJECTION, SOLUTION INTRAMUSCULAR at 20:39

## 2018-12-20 RX ADMIN — QUETIAPINE FUMARATE 12.5 MG: 25 TABLET, FILM COATED ORAL at 19:58

## 2018-12-20 RX ADMIN — HYDROCODONE BITARTRATE AND ACETAMINOPHEN 1 TABLET: 5; 325 TABLET ORAL at 17:30

## 2018-12-20 ASSESSMENT — MIFFLIN-ST. JEOR: SCORE: 1092.75

## 2018-12-20 NOTE — ED TRIAGE NOTES
Syncopal episode after getting out of shower, became unresponsive, denies CP, , did receive zofran IV in route via EMS.  Has history of HTN and Afib.

## 2018-12-20 NOTE — ED PROVIDER NOTES
History   Unresponsive episode    HPI  History per EMS, caregiver, daughter    This is an 88-year-old female with history of dementia presenting after an unresponsive episode.  Patient apparently was awake, alert, talking this morning.  Her caregiver gave her a shower and she was dressed and sitting in her wheelchair.  The caregiver turned out the light and patient suddenly became unresponsive and started snoring.  She rolled her out into the hallway and patient seemed to have some short apnea episodes and then had shallow breathing.  At some point they called 911.  She vomited once prior to EMS arrival.  EMS noted patient was somnolent and drowsy when they arrived.  In route, blood sugar was normal, she vomited one time and received Zofran, and EKG showed possible atrial fibrillation.  She denied any chest pain to them.    Caregiver notes that patient has been well recently, no new medications, no illnesses.      Problem List:    Patient Active Problem List    Diagnosis Date Noted     Unresponsive episode 12/20/2018     Priority: Medium     Syncope 12/20/2018     Priority: Medium     Chronic UTI 02/05/2018     Priority: Medium     Hypopotassemia 02/12/2017     Priority: Medium     Anemia 02/12/2017     Priority: Medium     Generalized muscle weakness 02/11/2017     Priority: Medium     Hip pain, right 09/07/2016     Priority: Medium     Chronic pain syndrome 09/07/2016     Priority: Medium     Hx of bladder infections 06/28/2016     Priority: Medium     Hypothyroidism, unspecified type 06/08/2016     Priority: Medium     Dementia without behavioral disturbance, unspecified dementia type 11/25/2015     Priority: Medium     Major depressive disorder, recurrent episode, mild (H) 11/25/2015     Priority: Medium     Risk for falls 11/25/2015     Priority: Medium     Persistent insomnia 08/20/2014     Priority: Medium     Chronic foot pain/bilateral 09/20/2012     Priority: Medium     `       Advance Care Planning  04/20/2012     Priority: Medium     Advance Care Planning 11/15/2016: Receipt of ACP document:  Received: POLST which was signed and dated by provider on 9-8-16.  Document previously scanned on 9-12-16.  Order reviewed and found to be valid.  Code Status needs to be updated to reflect choices in most recent ACP document. Orders are DNR only with trial of inbuation.  Confirmed/documented designated decision maker(s).  Added by Jyoti Angeles RN Advance Care Planning Liaison with [facility]  Advance Care Planning 11/15/2016: Receipt of ACP document:  Received: Health Care Directive which was witnessed or notarized on 3-24-14.  Document previously scanned on 9-20-16.  Validation form completed and sent to be scanned.  Code Status needs to be updated to reflect choices in most recent ACP document. Confirmed/documented designated decision maker(s).  Added by Jyoti Angeles RN Advance Care Planning Liaison with [facility]  Discussed advance care planning with patient; information given to patient to review.//Marcia Shay/[place]([facility])  4/20/2012          S/P hip replacement 04/20/2012     Priority: Medium     right hip       CKD (chronic kidney disease) stage 3, GFR 30-59 ml/min (H) 12/27/2011     Priority: Medium     Essential hypertension with goal blood pressure less than 140/90 06/27/2011     Priority: Medium     Balance problem 03/24/2011     Priority: Medium     Hip pain 03/24/2011     Priority: Medium     DJD (degenerative joint disease), lumbar 11/10/2009     Priority: Medium     Chronic right shoulder pain 03/04/2009     Priority: Medium     Chronic left shoulder pain 03/04/2009     Priority: Medium     Hard of hearing 10/21/2008     Priority: Medium     Flatulence, eructation, and gas pain 02/12/2008     Priority: Medium     Osteoporosis 04/05/2007     Priority: Medium     Problem list name updated by automated process. Provider to review       Gastroesophageal reflux disease with esophagitis 02/15/2005      Priority: Medium     Slow transit constipation 12/06/2003     Priority: Medium     Asymptomatic varicose veins 08/21/2001     Priority: Medium        Past Medical History:    Past Medical History:   Diagnosis Date     Enthesopathy of hip region      Lump or mass in breast 4/12/2006     Myalgia and myositis, unspecified      NONSPECIFIC MEDICAL HISTORY      NONSPECIFIC MEDICAL HISTORY      Osteoporosis, unspecified      Other kyphoscoliosis and scoliosis      Sicca syndrome (H)      Thyrotoxicosis without mention of goiter or other cause, without mention of thyrotoxic crisis or storm        Past Surgical History:    Past Surgical History:   Procedure Laterality Date     BIOPSY SALIVARY GLAND,INCISIONAL  03/21/2007    Inferior lip/     C ANESTH,REPAIR LO HERNIA VENTR/INCIS  1970's     C NONSPECIFIC PROCEDURE  04/09/2004    Lumbar epidural steroid injection via the posterior interlaminar approach at the follwoing spina level: L4-5.  The procedure was performed with fluoroscopic guidance.  ([facility])     C REPAIR OF RECTOCELE  8/20/99     C TOTAL ABDOM HYSTERECTOMY  1970's    Hysterectomy, Total Abdominal     C TOTAL HIP ARTHROPLASTY  7/04    Hip Replacement, Total, right     COLONOSCOPY  7/16/2013    Procedure: COLONOSCOPY;  colonoscopy;  Surgeon: Tiago Lowery MD;  Location: [facility]     HC ANTER COLPORRHAPHY,BLAD/VAGINA  8/20/99    Cystocele Repair     HC COLONOSCOPY THRU STOMA, DIAGNOSTIC  4/04     HC CORRECT BUNION,SIMPLE       HC DESTRUCT FACET JT NERVE, LUMB/SACR SINGLE LEVEL  2009     HC DRAIN/INJ MAJOR JOINT/BURSA W/O US  2009    Left shoulder     HC INJ EPIDURAL LUMBAR/SACRAL W/WO CONTRAST  2008    lumbar epidural steroid injection via posterior interlaminar approach at following spinal level-L4-5     HC PLACE NEEDLE WIRE PREOP, BREAST, INITIAL  04/07/06    Wire locallization of right  breast     HC REMOVAL OF LEG VEINS/ULCER      vein stripping     HC REMV CATARACT EXTRACAP,INSERT LENS   "7/17/2003    right     HC UGI ENDOSCOPY DIAG W OR W/O BRUSH/WASH  03/08/2006       Family History:    Family History   Problem Relation Age of Onset     Cancer Sister         Unknown       Social History:  Marital Status:   [5]  Social History     Tobacco Use     Smoking status: Never Smoker     Smokeless tobacco: Never Used     Tobacco comment: no smokers in the household   Substance Use Topics     Alcohol use: No     Drug use: No        Medications:      No current outpatient medications on file.      Review of Systems  All other ROS reviewed and are negative or non-contributory except as stated in HPI.    Physical Exam   BP: 146/75  Pulse: 67  Heart Rate: 64  Temp: 97.6  F (36.4  C)  Resp: 12  Height: 167.6 cm (5' 6\")  Weight: 65.3 kg (144 lb)  SpO2: 98 %      Physical Exam   Constitutional: She appears well-developed and well-nourished.   Elderly female sitting in the bed.  She does follow commands.  Covered in vomit.   HENT:   Head: Normocephalic.   Nose: Nose normal.   Mouth/Throat: Oropharynx is clear and moist.   Hearing aids   Eyes: Conjunctivae are normal. Pupils are equal, round, and reactive to light.   Neck: Normal range of motion. Neck supple.   Cardiovascular: Normal rate, normal heart sounds and intact distal pulses.   Normal rhythm with occasional ectopic beats   Pulmonary/Chest: Effort normal and breath sounds normal.   Abdominal: Soft. There is no tenderness.   Musculoskeletal: She exhibits no edema.   Neurological:   Patient is sitting in bed with her eyes closed.  She follows commands, moves all extremities, squeezes my hands, lifts each leg, and at the end of my exam was more awake and alert.  No focal neurologic deficits noted.   Skin: Skin is warm and dry. She is not diaphoretic.   Vitals reviewed.      ED Course (with Medical Decision Making)    Pt seen and examined by me.  RN and EPIC notes reviewed.      Patient with unresponsive episode, sonorous respirations, possible atrial " fibrillation, vomiting episode.  On my exam, she does not have any focal neurologic deficit, I think she actually is in a normal rhythm with some ectopic beats, and her vital signs are within normal limits.  An EKG was done which was normal.  IV placed, labs, I elected to do a chest x-ray because of her vomiting and she is an aspiration risk.  Head CT was also done.    Patient's hemoglobin is slightly low at 9.5.  Previous hemoglobin about 6 weeks ago was 10.1.  White count is normal.  Her BUN/creatinine is near baseline.  Troponin normal.  Head CT did not show any acute abnormalities.  Chest x-ray also did not show any acute changes compared with previous.  Please see below.    Patient was catheterized for urine.  This is normal.    I discussed the results with the patient's caregiver and daughter.  Because of the significance of her event, the vomiting, and unknown cause we have elected to have her admitted observation status.  She will be on telemetry.  In the ED, she has been drinking some thickened liquids and she had food normally.  She is stable.        Procedures         EKG Interpretation:      Interpreted by Eunice Whiteside  Time reviewed: 1128  Symptoms at time of EKG: none   Rhythm: normal sinus   Rate: normal  Axis: normal  Ectopy: none  Conduction: normal  ST Segments/ T Waves: No ST-T wave changes  Q Waves: none  Comparison to prior: Unchanged    Clinical Impression: normal EKG            Results for orders placed or performed during the hospital encounter of 12/20/18 (from the past 24 hour(s))   CBC with platelets differential   Result Value Ref Range    WBC 10.5 4.0 - 11.0 10e9/L    RBC Count 3.77 (L) 3.8 - 5.2 10e12/L    Hemoglobin 9.5 (L) 11.7 - 15.7 g/dL    Hematocrit 31.8 (L) 35.0 - 47.0 %    MCV 84 78 - 100 fl    MCH 25.2 (L) 26.5 - 33.0 pg    MCHC 29.9 (L) 31.5 - 36.5 g/dL    RDW 13.4 10.0 - 15.0 %    Platelet Count 413 150 - 450 10e9/L    Diff Method Automated Method     % Neutrophils 41.8 %     % Lymphocytes 46.9 %    % Monocytes 6.2 %    % Eosinophils 4.4 %    % Basophils 0.3 %    % Immature Granulocytes 0.4 %    Nucleated RBCs 0 0 /100    Absolute Neutrophil 4.4 1.6 - 8.3 10e9/L    Absolute Lymphocytes 4.9 0.8 - 5.3 10e9/L    Absolute Monocytes 0.7 0.0 - 1.3 10e9/L    Absolute Basophils 0.0 0.0 - 0.2 10e9/L    Abs Immature Granulocytes 0.0 0 - 0.4 10e9/L    Absolute Nucleated RBC 0.0    Comprehensive metabolic panel   Result Value Ref Range    Sodium 139 133 - 144 mmol/L    Potassium 4.1 3.4 - 5.3 mmol/L    Chloride 105 94 - 109 mmol/L    Carbon Dioxide 23 20 - 32 mmol/L    Anion Gap 11 3 - 14 mmol/L    Glucose 123 (H) 70 - 99 mg/dL    Urea Nitrogen 24 7 - 30 mg/dL    Creatinine 1.16 (H) 0.52 - 1.04 mg/dL    GFR Estimate 42 (L) >60 mL/min/[1.73_m2]    GFR Estimate If Black 48 (L) >60 mL/min/[1.73_m2]    Calcium 8.5 8.5 - 10.1 mg/dL    Bilirubin Total 0.2 0.2 - 1.3 mg/dL    Albumin 3.9 3.4 - 5.0 g/dL    Protein Total 7.6 6.8 - 8.8 g/dL    Alkaline Phosphatase 46 40 - 150 U/L    ALT 10 0 - 50 U/L    AST 12 0 - 45 U/L   Troponin I (now)   Result Value Ref Range    Troponin I ES <0.015 0.000 - 0.045 ug/L   Head CT w/o contrast    Narrative    CT OF THE HEAD WITHOUT CONTRAST December 20, 2018 12:19 PM     HISTORY: Altered level of consciousness (LOC), unexplained;  unresponsive episode.    TECHNIQUE: 5 mm thick axial CT images of the head were acquired  without IV contrast material. Radiation dose for this scan was reduced  using automated exposure control, adjustment of the mA and/or kV  according to patient size, or iterative reconstruction technique.    COMPARISON: None.    FINDINGS: There are postoperative changes consisting of a left frontal  pterional craniotomy and resection of the anterior portion of the left  temporal lobe. There is mild diffuse cerebral volume loss. There are  subtle patchy areas of decreased density in the cerebral white matter  bilaterally that are consistent with sequela of  chronic small vessel  ischemic disease.    The ventricles and basal cisterns are within normal limits in  configuration given the degree of cerebral volume loss. There is no  midline shift. There are no extra-axial fluid collections.     No intracranial hemorrhage, mass or recent infarct.    The visualized paranasal sinuses are well-aerated. There is no  mastoiditis. There are no fractures of the visualized bones.       Impression    IMPRESSION: Postoperative changes consisting of a left frontotemporal  craniotomy and resection of the anterior aspect of the left temporal  lobe. Diffuse cerebral volume loss and cerebral white matter changes  consistent with chronic small vessel ischemic disease. No evidence for  acute intracranial pathology.             SHRUTHI CALVILLO MD   XR Chest 1 View    Narrative    CHEST ONE VIEW December 20, 2018 12:21 PM     HISTORY: Vomited - risk for aspiration.    COMPARISON: Chest x-rays dated 1/15/2018.    FINDINGS: Lungs remain grossly clear. Cardiac silhouette is top normal  size. Mild widening mediastinum is stable and chronic in appearance  and could be partially due to the AP technique. No pneumothorax,  significant pleural fluid collection or acute fracture is identified.  Severe degenerative changes left shoulder are again noted.      Impression    IMPRESSION:  1. No evidence of acute cardiopulmonary disease is seen. Specifically,  no evidence for pneumonia is identified.  2. Cardiac silhouette remains mildly enlarged which may be partially  due to technique.  3. Mild widening of the mediastinum could be due to technique and is  not significantly changed.  4. Severe degenerative changes of the left shoulder are again noted.     ALEXANDRE ARGUETA MD   Routine UA with microscopic   Result Value Ref Range    Color Urine Yellow     Appearance Urine Clear     Glucose Urine Negative NEG^Negative mg/dL    Bilirubin Urine Negative NEG^Negative    Ketones Urine Negative NEG^Negative mg/dL     Specific Gravity Urine 1.019 1.003 - 1.035    Blood Urine Negative NEG^Negative    pH Urine 6.0 5.0 - 7.0 pH    Protein Albumin Urine 30 (A) NEG^Negative mg/dL    Urobilinogen mg/dL 0.0 0.0 - 2.0 mg/dL    Nitrite Urine Negative NEG^Negative    Leukocyte Esterase Urine Negative NEG^Negative    Source Catheterized Urine     WBC Urine 3 0 - 5 /HPF    RBC Urine 2 0 - 2 /HPF    Squamous Epithelial /HPF Urine <1 0 - 1 /HPF    Mucous Urine Present (A) NEG^Negative /LPF    Hyaline Casts 1 0 - 2 /LPF   Urine Culture Aerobic Bacterial   Result Value Ref Range    Specimen Description Unspecified Urine     Special Requests Specimen received in preservative     Culture Micro PENDING      *Note: Due to a large number of results and/or encounters for the requested time period, some results have not been displayed. A complete set of results can be found in Results Review.       Medications   ondansetron (ZOFRAN-ODT) ODT tab 4 mg (not administered)     Or   ondansetron (ZOFRAN) injection 4 mg (not administered)   docusate sodium (COLACE) capsule 100 mg (100 mg Oral Not Given 12/20/18 2041)   donepezil (ARICEPT) tablet 10 mg (not administered)   FLUoxetine (PROzac) capsule 20 mg (not administered)   QUEtiapine (SEROquel) tablet 25 mg (25 mg Oral Not Given 12/20/18 2041)   acetaminophen (TYLENOL) tablet 650 mg (not administered)   acetaminophen (TYLENOL) Suppository 650 mg (not administered)   naloxone (NARCAN) injection 0.1-0.4 mg (not administered)   melatonin tablet 1 mg (not administered)   QUEtiapine (SEROquel) half-tab 12.5 mg (12.5 mg Oral Given 12/20/18 1958)   HYDROcodone-acetaminophen (NORCO) 5-325 MG per tablet 1 tablet (1 tablet Oral Given 12/20/18 1730)   ranitidine (ZANTAC) tablet 150 mg (150 mg Oral Not Given 12/20/18 2041)   naproxen (NAPROSYN) tablet 250 mg (250 mg Oral Not Given 12/20/18 2040)   benztropine (COGENTIN) tablet 1-2 mg (not administered)   haloperidol lactate (HALDOL) injection 1-2 mg (2 mg Intramuscular  Given 12/20/18 2039)       Assessments & Plan    I have reviewed the findings, diagnosis, plan and need for follow up with the patient.       Medication List      There are no discharge medications for this visit.         Final diagnoses:   Unresponsive episode   Non-intractable vomiting with nausea, unspecified vomiting type     Disposition: Patient admitted observation status in stable condition.    Note: Chart documentation done in part with Dragon Voice Recognition software. Although reviewed after completion, some word and grammatical errors may remain.     12/20/2018   [facility] EMERGENCY DEPARTMENT     Eunice Whiteside MD  12/21/18 0003

## 2018-12-20 NOTE — ASSESSMENT & PLAN NOTE
Presented with an unresponsive/syncopal episode this morning while sitting in her wheelchair  Workup unrevealing  Labs normal  Urine negative - patient is on suppressive antibiotics  No arhythmia noted  Plan to monitor on Telemetry overnight

## 2018-12-20 NOTE — ED NOTES
ED Nursing criteria listed below was addressed during verbal handoff:     Abnormal vitals: No  Abnormal results: No  Med Reconciliation completed: No, daughter took med booklet with her and other daughter does not know anything about meds. I have a call out to Kristin to have her either bring the book back or go over the medications over the phone.  Meds given in ED: No  Any Overdue Meds: No  Core Measures: N/A  Isolation: N/A  Special needs: Yes, Dementia, high fall risk/wheelchair bound  Skin assessment: Yes, Redness to the back otherwise pale.    Observation Patient  Education provided: Yes

## 2018-12-20 NOTE — H&P
[facility]    History and Physical - Hospitalist Service       Date of Admission:  12/20/2018    Assessment & Plan   Anaid Dockery is a 88 year old female admitted on 12/20/2018. She presented to the ER via medics after syncopal episode with unresponsiveness.     Syncope  Presented with an unresponsive/syncopal episode this morning while sitting in her wheelchair  Workup unrevealing  Labs normal  Urine negative - patient is on suppressive antibiotics  No arhythmia noted  Plan to monitor on Telemetry overnight  Urine culture pending     Unresponsive episode  As above    Dementia without behavioral disturbance, unspecified dementia type  Chronic, stable  On Aricept and Seroquel  Continue medication    Major depressive disorder, recurrent episode, mild (H)  Chronic, Stable  On Prozac  Continue medication    Hypothyroidism, unspecified type  Chronic, Stable  On Synthroid  Continue medications    Essential hypertension with goal blood pressure less than 140/90  Chronic, Stable  On Metoprolol   Monitor on telemetry      Gastroesophageal reflux disease with esophagitis  Chronic  On Zantac  Resume on discharge    Chronic UTI  Chronic  Patient is on suppressive antibiotics daily  Resume on discharge  Urinalysis complete  Urine culture pending         Diet: Dysphagia Diet Level 2 [facility] Altered Honey Thickened Liquids (pre-thickened or use instant food thickener)    DVT Prophylaxis: Obs  Velazquez Catheter: not present  Code Status: DNR/DNI      Disposition Plan   Expected discharge: Tomorrow, recommended to prior living arrangement once safe disposition plan/ TCU bed available and neurologically and cardiac stable without change.  Entered: Jade Kennedy CNP 12/20/2018, 4:36 PM     The patient's care was discussed with the Patient's Family.    Jade Kennedy CNP  [facility]    ______________________________________________________________________    Chief Complaint   Syncope, Unresponsive    History is obtained from the electronic health record and emergency department physician    History of Present Illness   This is an 88-year-old female with history of dementia presenting after an unresponsive episode.  Patient apparently was awake, alert, talking this morning.  Her caregiver gave her a shower and she was dressed and sitting in her wheelchair.  The caregiver turned out the light and patient suddenly became unresponsive and started snoring.  She rolled her out into the hallway and patient seemed to have some short apnea episodes and then had shallow breathing.   911 was called. She vomited once prior to EMS arrival.  EMS noted patient was somnolent and drowsy when they arrived.  In route, blood sugar was normal, she vomited one time and received Zofran, and EKG showed possible atrial fibrillation.      Caregiver notes that patient has been well recently, no new medications, no illnesses.      Review of Systems    CONSTITUTIONAL: NEGATIVE for fever, chills, change in weight  ENT/MOUTH: NEGATIVE for ear, mouth and throat problems  RESP: NEGATIVE for significant cough or SOB  CV: NEGATIVE for chest pain, palpitations or peripheral edema  : negative for and hematuria  MUSCULOSKELETAL: NEGATIVE for significant arthralgias or myalgia  NEURO: POSITIVE for syncope and weakness     Past Medical History    I have reviewed this patient's medical history and updated it with pertinent information if needed.   Past Medical History:   Diagnosis Date     Enthesopathy of hip region     Bilateral trochanteric bursitis     Lump or mass in breast 4/12/2006     Myalgia and myositis, unspecified     fibromyalgia; Myalgias/arthralgias.     NONSPECIFIC MEDICAL HISTORY     Iliotibial band syndrome.     NONSPECIFIC MEDICAL HISTORY     Chronic pain syndrome     Osteoporosis, unspecified      Other  kyphoscoliosis and scoliosis      Sicca syndrome (H)      Thyrotoxicosis without mention of goiter or other cause, without mention of thyrotoxic crisis or storm     hyperthyroidism       Past Surgical History   I have reviewed this patient's surgical history and updated it with pertinent information if needed.  Past Surgical History:   Procedure Laterality Date     BIOPSY SALIVARY GLAND,INCISIONAL  03/21/2007    Inferior lip/     C ANESTH,REPAIR LO HERNIA VENTR/INCIS  1970's     C NONSPECIFIC PROCEDURE  04/09/2004    Lumbar epidural steroid injection via the posterior interlaminar approach at the follwoing spina level: L4-5.  The procedure was performed with fluoroscopic guidance.  ([facility]     C REPAIR OF RECTOCELE  8/20/99     C TOTAL ABDOM HYSTERECTOMY  1970's    Hysterectomy, Total Abdominal     C TOTAL HIP ARTHROPLASTY  7/04    Hip Replacement, Total, right     COLONOSCOPY  7/16/2013    Procedure: COLONOSCOPY;  colonoscopy;  Surgeon: Tiago Lowery MD;  Location: [facility]     HC ANTER COLPORRHAPHY,BLAD/VAGINA  8/20/99    Cystocele Repair     HC COLONOSCOPY THRU STOMA, DIAGNOSTIC  4/04     HC CORRECT BUNION,SIMPLE       HC DESTRUCT FACET JT NERVE, LUMB/SACR SINGLE LEVEL  2009     HC DRAIN/INJ MAJOR JOINT/BURSA W/O US  2009    Left shoulder     HC INJ EPIDURAL LUMBAR/SACRAL W/WO CONTRAST  2008    lumbar epidural steroid injection via posterior interlaminar approach at following spinal level-L4-5     HC PLACE NEEDLE WIRE PREOP, BREAST, INITIAL  04/07/06    Wire locallization of right  breast     HC REMOVAL OF LEG VEINS/ULCER      vein stripping     HC REMV CATARACT EXTRACAP,INSERT LENS  7/17/2003    right     HC UGI ENDOSCOPY DIAG W OR W/O BRUSH/WASH  03/08/2006       Social History   I have reviewed this patient's social history and updated it with pertinent information if needed.  Social History     Tobacco Use     Smoking status: Never Smoker     Smokeless tobacco: Never Used     Tobacco  comment: no smokers in the household   Substance Use Topics     Alcohol use: No     Drug use: No       Family History   I have reviewed this patient's family history and updated it with pertinent information if needed.   Family History   Problem Relation Age of Onset     Cancer Sister         Unknown       Prior to Admission Medications   Prior to Admission Medications   Prescriptions Last Dose Informant Patient Reported? Taking?   Acetaminophen (ACTAMIN MAXIMUM STRENGTH OR) 2018 at 1200  Yes Yes   Sig: Take 1,000 mg by mouth once Every 4-6 hours for pain   FLUoxetine (PROZAC) 20 MG capsule 2018 at AM  No Yes   Sig: TAKE ONE CAPSULE BY MOUTH EVERY DAY   HYDROcodone-acetaminophen (NORCO) 5-325 MG tablet   No No   Sig: TAKE 1 TABLET BY ORAL ROUTE EVERY 6 HOURS AS NEEDED FOR PAIN   Patient taking differently: 1 @ 0700, 1 @1600, 1@ HS scheduled.   MULTI-VITAMIN TABS   OR More than a month at Unknown time  No No   Si tablet daily   QUEtiapine (SEROQUEL) 25 MG tablet 2018 at PM  No Yes   Sig: TAKE ONE TABLET BY MOUTH AT BEDTIME AND TAKE 1 AND 1/2 TABLETS DAILY AS NEEDED FOR RESTLESSNESS/AGITATION   aspirin 81 MG tablet 2018 at AM  Yes Yes   Sig: ONE DAILY   diclofenac (VOLTAREN) 50 MG EC tablet 2018 at AM  No Yes   Sig: TAKE ONE TABLET BY MOUTH TWICE A DAY   docusate sodium (COLACE) 100 MG capsule 2018 at AM  Yes Yes   Sig: Take 100 mg by mouth 2 times daily   donepezil (ARICEPT) 10 MG tablet 2018 at AM  No Yes   Sig: TAKE ONE TABLET BY MOUTH EVERY NIGHT AT BEDTIME   Patient taking differently: Take one pill every morning   levocetirizine (XYZAL) 5 MG tablet   No No   Sig: TAKE ONE TABLET BY MOUTH EVERY EVENING   levothyroxine (SYNTHROID/LEVOTHROID) 100 MCG tablet 2018 at AM  No Yes   Sig: Take 1 tablet (100 mcg) by mouth daily   metoprolol tartrate (LOPRESSOR) 25 MG tablet 2018 at AM  No Yes   Sig: TAKE ONE TABLET BY MOUTH EVERY MORNING AND TWO TABLETS EVERY  EVENING   nitroFURantoin, macrocrystal-monohydrate, (MACROBID) 100 MG capsule More than a month at Unknown time  No No   Sig: Take 1 capsule (100 mg) by mouth 2 times daily   order for DME   No No   Sig: Equipment being ordered: Hospital Bed   polyethylene glycol (MIRALAX/GLYCOLAX) packet 12/20/2018 at AM  Yes Yes   Sig: Take 1 packet by mouth daily   ranitidine (ZANTAC) 300 MG tablet 12/20/2018 at AM  No Yes   Sig: TAKE ONE TABLET BY MOUTH EVERY DAY   trimethoprim (TRIMPEX) 100 MG tablet 12/19/2018 at 1200  No Yes   Sig: TAKE 1/2 TABLET BY MOUTH DAILY      Facility-Administered Medications: None     Allergies   Allergies   Allergen Reactions     Levofloxacin Other (See Comments)     Agitation and combativeness     Bactrim [Sulfamethoxazole W/Trimethoprim]      Codeine Itching     endocet/percocet caused     Cortisone Acetate      20 years ago doesn't remember exact side effect. Has tolerated steroid injection into joints. 6/2008 noted     Oxycodone Itching       Physical Exam   Vital Signs: Temp: 97.6  F (36.4  C) Temp src: Temporal BP: 151/72 Pulse: 71 Heart Rate: 71 Resp: 14 SpO2: 100 % O2 Device: None (Room air)    Weight: 144 lbs 0 oz    Constitutional: awake, lying in bed, NAD  Respiratory: No increased work of breathing, good air exchange, clear to auscultation bilaterally, no crackles or wheezing  Cardiovascular: normal apical pulses , regular rate and rhythm and normal S1 and S2  GI: normal bowel sounds  Skin: normal skin color, texture, turgor  Musculoskeletal: there is no redness, warmth, or swelling of the joints  full range of motion noted  Neurologic: Mental Status Exam:  Level of Alertness:   awake  Orientation:   person, place    Data   Data reviewed today: I reviewed all medications, new labs and imaging results over the last 24 hours. I personally reviewed the EKG tracing, Head CT, chest x-ray reports    Recent Labs   Lab 12/20/18  1135   WBC 10.5   HGB 9.5*   MCV 84         POTASSIUM  4.1   CHLORIDE 105   CO2 23   BUN 24   CR 1.16*   ANIONGAP 11   DIOGO 8.5   *   ALBUMIN 3.9   PROTTOTAL 7.6   BILITOTAL 0.2   ALKPHOS 46   ALT 10   AST 12   TROPI <0.015     Recent Results (from the past 24 hour(s))   Head CT w/o contrast    Narrative    CT OF THE HEAD WITHOUT CONTRAST December 20, 2018 12:19 PM     HISTORY: Altered level of consciousness (LOC), unexplained;  unresponsive episode.    TECHNIQUE: 5 mm thick axial CT images of the head were acquired  without IV contrast material. Radiation dose for this scan was reduced  using automated exposure control, adjustment of the mA and/or kV  according to patient size, or iterative reconstruction technique.    COMPARISON: None.    FINDINGS: There are postoperative changes consisting of a left frontal  pterional craniotomy and resection of the anterior portion of the left  temporal lobe. There is mild diffuse cerebral volume loss. There are  subtle patchy areas of decreased density in the cerebral white matter  bilaterally that are consistent with sequela of chronic small vessel  ischemic disease.    The ventricles and basal cisterns are within normal limits in  configuration given the degree of cerebral volume loss. There is no  midline shift. There are no extra-axial fluid collections.     No intracranial hemorrhage, mass or recent infarct.    The visualized paranasal sinuses are well-aerated. There is no  mastoiditis. There are no fractures of the visualized bones.       Impression    IMPRESSION: Postoperative changes consisting of a left frontotemporal  craniotomy and resection of the anterior aspect of the left temporal  lobe. Diffuse cerebral volume loss and cerebral white matter changes  consistent with chronic small vessel ischemic disease. No evidence for  acute intracranial pathology.             SHRUTHI CALVILLO MD   XR Chest 1 View    Narrative    CHEST ONE VIEW December 20, 2018 12:21 PM     HISTORY: Vomited - risk for  aspiration.    COMPARISON: Chest x-rays dated 1/15/2018.    FINDINGS: Lungs remain grossly clear. Cardiac silhouette is top normal  size. Mild widening mediastinum is stable and chronic in appearance  and could be partially due to the AP technique. No pneumothorax,  significant pleural fluid collection or acute fracture is identified.  Severe degenerative changes left shoulder are again noted.      Impression    IMPRESSION:  1. No evidence of acute cardiopulmonary disease is seen. Specifically,  no evidence for pneumonia is identified.  2. Cardiac silhouette remains mildly enlarged which may be partially  due to technique.  3. Mild widening of the mediastinum could be due to technique and is  not significantly changed.  4. Severe degenerative changes of the left shoulder are again noted.

## 2018-12-20 NOTE — ED NOTES
Bed: ED05  Expected date:   Expected time:   Means of arrival:   Comments:  Decreased LOC; Syncope

## 2018-12-21 ENCOUNTER — TELEPHONE (OUTPATIENT)
Dept: FAMILY MEDICINE | Facility: CLINIC | Age: 83
End: 2018-12-21

## 2018-12-21 VITALS
TEMPERATURE: 97 F | WEIGHT: 142.42 LBS | RESPIRATION RATE: 18 BRPM | SYSTOLIC BLOOD PRESSURE: 148 MMHG | HEART RATE: 71 BPM | OXYGEN SATURATION: 98 % | BODY MASS INDEX: 22.89 KG/M2 | DIASTOLIC BLOOD PRESSURE: 58 MMHG | HEIGHT: 66 IN

## 2018-12-21 LAB
BACTERIA SPEC CULT: NO GROWTH
Lab: NORMAL
SPECIMEN SOURCE: NORMAL

## 2018-12-21 PROCEDURE — 25000132 ZZH RX MED GY IP 250 OP 250 PS 637: Mod: GY | Performed by: NURSE PRACTITIONER

## 2018-12-21 PROCEDURE — A9270 NON-COVERED ITEM OR SERVICE: HCPCS | Mod: GY | Performed by: NURSE PRACTITIONER

## 2018-12-21 PROCEDURE — 99217 ZZC OBSERVATION CARE DISCHARGE: CPT | Performed by: PEDIATRICS

## 2018-12-21 PROCEDURE — G0378 HOSPITAL OBSERVATION PER HR: HCPCS

## 2018-12-21 RX ADMIN — RANITIDINE 150 MG: 150 TABLET ORAL at 08:35

## 2018-12-21 RX ADMIN — NAPROXEN 250 MG: 250 TABLET ORAL at 08:38

## 2018-12-21 RX ADMIN — DONEPEZIL HYDROCHLORIDE 10 MG: 5 TABLET ORAL at 08:35

## 2018-12-21 RX ADMIN — FLUOXETINE 20 MG: 20 CAPSULE ORAL at 08:34

## 2018-12-21 RX ADMIN — DOCUSATE SODIUM 100 MG: 100 CAPSULE, LIQUID FILLED ORAL at 08:35

## 2018-12-21 NOTE — PROGRESS NOTES
S-(situation): Increased confusion,history of Sundowner's    B-(background): syncopal episode    A-(assessment): Pt.is getting more agitated and has history of Sundowner's.Brought pt.to the bathroom and she was uncooperative with putting brief on and allowing us to take her vitals.She is normally given seroquel for this behavior.VPM order placed for pts.behavior so we can monitor her for safety.    R-(recommendations): Monitor pt.

## 2018-12-21 NOTE — DISCHARGE SUMMARY
[facility] Observation Discharge Summary    Anaid Dockery MRN# [id number]   Age: 88 year old YOB: 1930     Date of Observation:  12/20/2018  Date of Discharge:  12/21/2018   Initial Physician:  Vinicius Naqvi MD  Discharge Physician:  Vinicius Naqvi MD     Home clinic: [facility]  Primary care provider: Tai Kim          Admission Diagnoses:   Unresponsive episode [R41.89]  Non-intractable vomiting with nausea, unspecified vomiting type [R11.2]          Discharge Diagnoses:   Principal diagnosis: Syncope    Secondary diagnoses:    Syncope    Dementia without behavioral disturbance, unspecified dementia type    Major depressive disorder, recurrent episode, mild (H)    Gastroesophageal reflux disease with esophagitis    Chronic UTI    Unresponsive episode    Hypothyroidism, unspecified type    Essential hypertension with goal blood pressure less than 140/90    * No resolved hospital problems. *            Procedures:   No procedures performed during this hospital stay             Discharge Medications:     Outpatient Medications Marked as Taking for the 12/20/18 encounter (Hospital Encounter)   Medication Sig     Acetaminophen (ACTAMIN MAXIMUM STRENGTH OR) Take 1,000 mg by mouth once Every 4-6 hours for pain     aspirin 81 MG tablet ONE DAILY     diclofenac (VOLTAREN) 50 MG EC tablet TAKE ONE TABLET BY MOUTH TWICE A DAY     docusate sodium (COLACE) 100 MG capsule Take 100 mg by mouth 2 times daily     donepezil (ARICEPT) 10 MG tablet TAKE ONE TABLET BY MOUTH EVERY NIGHT AT BEDTIME (Patient taking differently: Take one pill every morning)     FLUoxetine (PROZAC) 20 MG capsule TAKE ONE CAPSULE BY MOUTH EVERY DAY     levothyroxine (SYNTHROID/LEVOTHROID) 100 MCG tablet Take 1 tablet (100 mcg) by mouth daily     metoprolol tartrate (LOPRESSOR) 25 MG tablet TAKE ONE TABLET BY MOUTH EVERY MORNING AND TWO TABLETS EVERY EVENING     polyethylene glycol (MIRALAX/GLYCOLAX)  packet Take 1 packet by mouth daily     QUEtiapine (SEROQUEL) 25 MG tablet TAKE ONE TABLET BY MOUTH AT BEDTIME AND TAKE 1 AND 1/2 TABLETS DAILY AS NEEDED FOR RESTLESSNESS/AGITATION     ranitidine (ZANTAC) 300 MG tablet TAKE ONE TABLET BY MOUTH EVERY DAY     trimethoprim (TRIMPEX) 100 MG tablet TAKE 1/2 TABLET BY MOUTH DAILY       Current Discharge Medication List      CONTINUE these medications which have NOT CHANGED    Details   Acetaminophen (ACTAMIN MAXIMUM STRENGTH OR) Take 1,000 mg by mouth once Every 4-6 hours for pain      aspirin 81 MG tablet ONE DAILY  Qty: 100 tablet, Refills: 3    Associated Diagnoses: Essential hypertension with goal blood pressure less than 140/90      diclofenac (VOLTAREN) 50 MG EC tablet TAKE ONE TABLET BY MOUTH TWICE A DAY  Qty: 180 tablet, Refills: 2    Associated Diagnoses: CKD (chronic kidney disease) stage 3, GFR 30-59 ml/min (H); Essential hypertension with goal blood pressure less than 140/90      docusate sodium (COLACE) 100 MG capsule Take 100 mg by mouth 2 times daily      donepezil (ARICEPT) 10 MG tablet TAKE ONE TABLET BY MOUTH EVERY NIGHT AT BEDTIME  Qty: 90 tablet, Refills: 1    Associated Diagnoses: Dementia without behavioral disturbance, unspecified dementia type      FLUoxetine (PROZAC) 20 MG capsule TAKE ONE CAPSULE BY MOUTH EVERY DAY  Qty: 90 capsule, Refills: 1    Associated Diagnoses: Major depressive disorder, recurrent episode, mild (H)      levothyroxine (SYNTHROID/LEVOTHROID) 100 MCG tablet Take 1 tablet (100 mcg) by mouth daily  Qty: 90 tablet, Refills: 3    Comments: Profile Rx: patient will contact pharmacy when needed  Associated Diagnoses: Hypothyroidism, unspecified type      metoprolol tartrate (LOPRESSOR) 25 MG tablet TAKE ONE TABLET BY MOUTH EVERY MORNING AND TWO TABLETS EVERY EVENING  Qty: 270 tablet, Refills: 1    Associated Diagnoses: Hypertension goal BP (blood pressure) < 140/90      polyethylene glycol (MIRALAX/GLYCOLAX) packet Take 1 packet by  mouth daily      QUEtiapine (SEROQUEL) 25 MG tablet TAKE ONE TABLET BY MOUTH AT BEDTIME AND TAKE 1 AND 1/2 TABLETS DAILY AS NEEDED FOR RESTLESSNESS/AGITATION  Qty: 225 tablet, Refills: 1    Associated Diagnoses: Dementia without behavioral disturbance, unspecified dementia type      ranitidine (ZANTAC) 300 MG tablet TAKE ONE TABLET BY MOUTH EVERY DAY  Qty: 90 tablet, Refills: 2    Associated Diagnoses: Gastroesophageal reflux disease with esophagitis      trimethoprim (TRIMPEX) 100 MG tablet TAKE 1/2 TABLET BY MOUTH DAILY  Qty: 30 tablet, Refills: 4    Associated Diagnoses: Risk for falls; Chronic UTI      HYDROcodone-acetaminophen (NORCO) 5-325 MG tablet TAKE 1 TABLET BY ORAL ROUTE EVERY 6 HOURS AS NEEDED FOR PAIN  Qty: 60 tablet, Refills: 0    Associated Diagnoses: Polymyalgia rheumatica (H)      levocetirizine (XYZAL) 5 MG tablet TAKE ONE TABLET BY MOUTH EVERY EVENING  Qty: 30 tablet, Refills: 9    Associated Diagnoses: Seasonal allergic rhinitis      MULTI-VITAMIN TABS   OR 1 tablet daily  Qty: 120, Refills: 0      nitroFURantoin, macrocrystal-monohydrate, (MACROBID) 100 MG capsule Take 1 capsule (100 mg) by mouth 2 times daily  Qty: 14 capsule, Refills: 6    Comments: Profile Rx: patient will contact pharmacy when needed.  Associated Diagnoses: Urinary tract infection without hematuria, site unspecified      order for DME Equipment being ordered: Hospital Bed  Qty: 1 each, Refills: 0    Associated Diagnoses: Generalized muscle weakness; Chronic pain syndrome                   Consultations:   No consultations were requested during this hospital stay            Brief History of Illness:   88 year old female patient with chronic medical problems including previous brain surgery, dementia and hypertension presented with an episode of unresponsiveness.  Due to concern for syncope, hospitalization for observation was advised. See ER note and history and physical for details.          Hospital Course:   Patient was  "observed in the hospital.  She was monitored on telemetry without any dysrhythmias.  Laboratory evaluation including urinalysis was unremarkable.  Urine culture was pending at discharge and she was not treated with antibiotics because she did not report any symptoms suspicious for UTI.  She had an unremarkable clinical course.  She appeared to have returned to her baseline mentation and level of function.  Discharge home with her care attendant and close follow-up was recommended.    Discharge exam:   Vital signs:  Temp: 97  F (36.1  C) Temp src: Oral BP: 148/58 Pulse: 71 Heart Rate: 71 Resp: 18 SpO2: 98 % O2 Device: None (Room air)   Height: 167.6 cm (5' 6\") Weight: 64.6 kg (142 lb 6.7 oz)  Estimated body mass index is 22.99 kg/m  as calculated from the following:    Height as of this encounter: 1.676 m (5' 6\").    Weight as of this encounter: 64.6 kg (142 lb 6.7 oz).    No acute distress sitting in a chair  Normal respiratory effort, clear lungs  Alert and appears to maintain wakefulness, speech is understandable, seems to have some memory problems    Data   Most Recent 3 CBC's:  Recent Labs   Lab Test 12/20/18 1135 11/02/18  1440 01/15/18  2000   WBC 10.5 10.5 9.2   HGB 9.5* 10.2* 11.6*   MCV 84 84 90    383 311      Most Recent 3 BMP's:  Recent Labs   Lab Test 12/20/18 1135 11/02/18  1440 01/15/18  2000    138 141   POTASSIUM 4.1 4.1 3.9   CHLORIDE 105 105 106   CO2 23 21 27   BUN 24 27 19   CR 1.16* 1.11* 0.79   ANIONGAP 11 12 8   DIOGO 8.5 8.5 8.8   * 110* 125*     Most Recent 2 LFT's:  Recent Labs   Lab Test 12/20/18  1135 01/15/18  2000   AST 12 17   ALT 10 16   ALKPHOS 46 55   BILITOTAL 0.2 0.2     Most Recent INR's and Anticoagulation Dosing History:  Anticoagulation Dose History     There is no flowsheet data to display.        Most Recent 3 Troponin's:  Recent Labs   Lab Test 12/20/18  1135 01/15/18  2000 10/16/15  1900   TROPI <0.015 <0.015 <0.015  The 99th percentile for upper " reference range is 0.045 ug/L.  Troponin values in   the range of 0.045 - 0.120 ug/L may be associated with risks of adverse   clinical events.       Most Recent 6 Bacteria Isolates From Any Culture (See EPIC Reports for Culture Details):  Recent Labs   Lab Test 12/20/18  1341 10/26/18  1047 01/15/18  2043 11/20/17  1500 08/21/17  1316 02/11/17  1100   CULT PENDING >100,000 colonies/mL  Escherichia coli  * >100,000 colonies/mL  Escherichia coli  *  10,000 to 50,000 colonies/mL  Escherichia coli  * >100,000 colonies/mL  Aerococcus urinae  Identification obtained by MALDI-TOF mass spectrometry research use only database. Test   characteristics determined and verified by the [facility]   ([facility]) [place], [place].  *  10,000 to 50,000 colonies/mL  mixed urogenital dania  Susceptibility testing not routinely done   >100,000 colonies/mL  Escherichia coli  * No MRSA isolated     Most Recent TSH, T4 and A1c Labs:  Recent Labs   Lab Test 11/02/18  1440 08/21/17  1415   TSH 0.61 0.03*   T4  --  1.69*     Results for orders placed or performed during the hospital encounter of 12/20/18   Head CT w/o contrast    Narrative    CT OF THE HEAD WITHOUT CONTRAST December 20, 2018 12:19 PM     HISTORY: Altered level of consciousness (LOC), unexplained;  unresponsive episode.    TECHNIQUE: 5 mm thick axial CT images of the head were acquired  without IV contrast material. Radiation dose for this scan was reduced  using automated exposure control, adjustment of the mA and/or kV  according to patient size, or iterative reconstruction technique.    COMPARISON: None.    FINDINGS: There are postoperative changes consisting of a left frontal  pterional craniotomy and resection of the anterior portion of the left  temporal lobe. There is mild diffuse cerebral volume loss. There are  subtle patchy areas of decreased density in the cerebral white matter  bilaterally that are consistent with sequela of chronic small  vessel  ischemic disease.    The ventricles and basal cisterns are within normal limits in  configuration given the degree of cerebral volume loss. There is no  midline shift. There are no extra-axial fluid collections.     No intracranial hemorrhage, mass or recent infarct.    The visualized paranasal sinuses are well-aerated. There is no  mastoiditis. There are no fractures of the visualized bones.       Impression    IMPRESSION: Postoperative changes consisting of a left frontotemporal  craniotomy and resection of the anterior aspect of the left temporal  lobe. Diffuse cerebral volume loss and cerebral white matter changes  consistent with chronic small vessel ischemic disease. No evidence for  acute intracranial pathology.             SHRUTHI CALVILLO MD   XR Chest 1 View    Narrative    CHEST ONE VIEW December 20, 2018 12:21 PM     HISTORY: Vomited - risk for aspiration.    COMPARISON: Chest x-rays dated 1/15/2018.    FINDINGS: Lungs remain grossly clear. Cardiac silhouette is top normal  size. Mild widening mediastinum is stable and chronic in appearance  and could be partially due to the AP technique. No pneumothorax,  significant pleural fluid collection or acute fracture is identified.  Severe degenerative changes left shoulder are again noted.      Impression    IMPRESSION:  1. No evidence of acute cardiopulmonary disease is seen. Specifically,  no evidence for pneumonia is identified.  2. Cardiac silhouette remains mildly enlarged which may be partially  due to technique.  3. Mild widening of the mediastinum could be due to technique and is  not significantly changed.  4. Severe degenerative changes of the left shoulder are again noted.     ALEXANDRE ARGUETA MD     *Note: Due to a large number of results and/or encounters for the requested time period, some results have not been displayed. A complete set of results can be found in Results Review.               Discharge Instructions and Follow-Up:   Discharge  diet: Regular   Discharge activity: Activity as tolerated   Discharge follow-up: Follow up with primary care provider in 2 weeks      Pending test results:   Unresulted Labs Ordered in the Past 30 Days of this Admission     Date and Time Order Name Status Description    12/20/2018 1644 Urine Culture Aerobic Bacterial Preliminary                Discharge Disposition:   Discharged to home      Attestation:  I have reviewed today's vital signs, notes, medications, and test results.    Vinicius Naqvi MD

## 2018-12-21 NOTE — TELEPHONE ENCOUNTER
Reason for Call:  Same Day Appointment, Requested Provider:  Tai Kim M.D.    PCP: Tai [facility]    Reason for visit: Post hospital check     Duration of symptoms:      Have you been treated for this in the past? Yes    Additional comments: Discharging from the hospital today and hospital nurse states she needs a follow up with you. Informed hospital nurse not sure of when Dr. Kim will return. If he is not in asking for the care team to work in.     Can we leave a detailed message on this number? YES    Phone number patient can be reached at: Cell number on file:    Telephone Information:   Mobile [phone]       Best Time:      Call taken on 12/21/2018 at 9:22 AM by Vandana Angeles

## 2018-12-21 NOTE — PROGRESS NOTES
Pt.s neuros have been stable.Pt.has VPM.She is now resting and calm.She would not allow us to take her evening vitals.Pt.transfers poorly with assist of 2 she is quite unsteady.One of the employees from her foster care home stayed until around.7 tonight.Tele is SR without ectopy.

## 2018-12-21 NOTE — PLAN OF CARE
VSS. Afebrile. No complaint of pain/discomfort this shift. Pt attempts to get out of bed when trying to get to the bathroom. Pt assist of 2. VPM in use for pt safety. LS clear throughout. No syncopal episodes noted. CMS intact. Pt moving all extremities purposefully. No complaints.

## 2018-12-21 NOTE — PROGRESS NOTES
S-(situation): Patient discharged to home via W/C with daughter    B-(background): Observation goals met     A-(assessment): Obs goals met     R-(recommendations): Discharge instructions reviewed with patient and mother. Listed belongings gathered and returned to patient.  Patient Education resolved: Yes  New medications-Pt. Has been educated about reason of use and side effects Yes  Home and hospital acquired medications returned to patient Yes  Medication Bin checked and emptied on discharge Yes

## 2018-12-25 DIAGNOSIS — I10 HYPERTENSION GOAL BP (BLOOD PRESSURE) < 140/90: ICD-10-CM

## 2018-12-27 RX ORDER — METOPROLOL TARTRATE 25 MG/1
TABLET, FILM COATED ORAL
Qty: 270 TABLET | Refills: 0 | Status: SHIPPED | OUTPATIENT
Start: 2018-12-27 | End: 2019-04-19

## 2018-12-27 NOTE — TELEPHONE ENCOUNTER
"Requested Prescriptions   Pending Prescriptions Disp Refills     metoprolol tartrate (LOPRESSOR) 25 MG tablet [Pharmacy Med Name: METOPROLOL TARTRATE 25MG TABS] 270 tablet 1    Last Written Prescription Date:  7/6/18  Last Fill Quantity: 270,  # refills: 1   Last office visit: 11/2/2018 with prescribing provider:     Future Office Visit:   Next 5 appointments (look out 90 days)    Jan 03, 2019 11:40 AM CST  Office Visit with Pranav Angeles MD  [facility] ([address] [id number] [phone]          Sig: TAKE ONE TABLET BY MOUTH EVERY MORNING AND TWO TABLETS EVERY EVENING    Beta-Blockers Protocol Failed - 12/25/2018  2:28 PM       Failed - Blood pressure under 140/90 in past 12 months    BP Readings from Last 3 Encounters:   12/21/18 148/58   11/02/18 112/60   10/26/18 110/54          Passed - Patient is age 6 or older       Passed - Recent (12 mo) or future (30 days) visit within the authorizing provider's specialty    Patient had office visit in the last 12 months or has a visit in the next 30 days with authorizing provider or within the authorizing provider's specialty.  See \"Patient Info\" tab in inbasket, or \"Choose Columns\" in Meds & Orders section of the refill encounter.          Prescription approved per [facility] Refill Protocol.    Denisse Myers RN            "

## 2018-12-31 DIAGNOSIS — I10 HYPERTENSION GOAL BP (BLOOD PRESSURE) < 140/90: ICD-10-CM

## 2018-12-31 DIAGNOSIS — M35.3 POLYMYALGIA RHEUMATICA (H): ICD-10-CM

## 2018-12-31 RX ORDER — HYDROCODONE BITARTRATE AND ACETAMINOPHEN 5; 325 MG/1; MG/1
TABLET ORAL
Qty: 60 TABLET | Refills: 0 | Status: SHIPPED | OUTPATIENT
Start: 2018-12-31 | End: 2019-01-23

## 2019-01-02 RX ORDER — METOPROLOL TARTRATE 25 MG/1
TABLET, FILM COATED ORAL
Qty: 270 TABLET | Refills: 3 | Status: SHIPPED | OUTPATIENT
Start: 2019-01-02

## 2019-01-02 NOTE — TELEPHONE ENCOUNTER
"Last Written Prescription Date:  12/27/18  Last Fill Quantity: 270,  # refills: 0   Last office visit: 11/2/2018 with prescribing provider:  Tai Kim   Future Office Visit:      Requested Prescriptions   Pending Prescriptions Disp Refills     metoprolol tartrate (LOPRESSOR) 25 MG tablet [Pharmacy Med Name: METOPROLOL TARTRATE 25MG TABS] 270 tablet 1     Sig: TAKE ONE TABLET BY MOUTH EVERY MORNING AND TWO TABLETS EVERY EVENING    Beta-Blockers Protocol Failed - 12/31/2018  8:36 AM       Failed - Blood pressure under 140/90 in past 12 months    BP Readings from Last 3 Encounters:   12/21/18 148/58   11/02/18 112/60   10/26/18 110/54                Passed - Patient is age 6 or older       Passed - Recent (12 mo) or future (30 days) visit within the authorizing provider's specialty    Patient had office visit in the last 12 months or has a visit in the next 30 days with authorizing provider or within the authorizing provider's specialty.  See \"Patient Info\" tab in inbasket, or \"Choose Columns\" in Meds & Orders section of the refill encounter.              Routing refill request to provider for review/approval because:  Labs out of range:  BP    Melanie Kennedy RN       "

## 2019-01-02 NOTE — TELEPHONE ENCOUNTER
2 Signed original RX delivered to [facility] by Dr Kim on 12/31/18. Nola,

## 2019-01-21 DIAGNOSIS — M35.3 POLYMYALGIA RHEUMATICA (H): ICD-10-CM

## 2019-01-22 NOTE — TELEPHONE ENCOUNTER
Norco 5-325 MG       Last Written Prescription Date:  12/31/18  Last Fill Quantity: 60,   # refills: 0  Last Office Visit: 11/2/18  Future Office visit:       Routing refill request to provider for review/approval because:  Drug not on the [facility], [facility] or [facility] refill protocol or controlled substance

## 2019-01-23 RX ORDER — HYDROCODONE BITARTRATE AND ACETAMINOPHEN 5; 325 MG/1; MG/1
TABLET ORAL
Qty: 60 TABLET | Refills: 0 | Status: SHIPPED | OUTPATIENT
Start: 2019-01-23 | End: 2019-02-04

## 2019-02-04 DIAGNOSIS — M35.3 POLYMYALGIA RHEUMATICA (H): ICD-10-CM

## 2019-02-04 RX ORDER — HYDROCODONE BITARTRATE AND ACETAMINOPHEN 5; 325 MG/1; MG/1
TABLET ORAL
Qty: 60 TABLET | Refills: 0 | Status: SHIPPED | OUTPATIENT
Start: 2019-02-04 | End: 2019-02-25

## 2019-02-04 NOTE — TELEPHONE ENCOUNTER
Routing refill request to provider for review/approval because:  Drug not on the [facility] refill protocol     norco  Last Written Prescription Date:  1/23/2019  Last Fill Quantity: 60,  # refills: 0   Last office visit: 11/2/2018 with prescribing provider:  11/2/2018   Future Office Visit:      Requested Prescriptions   Pending Prescriptions Disp Refills     HYDROcodone-acetaminophen (NORCO) 5-325 MG tablet 60 tablet 0     Sig: TAKE 1 TABLET BY ORAL ROUTE EVERY 6 HOURS AS NEEDED FOR PAIN    There is no refill protocol information for this order          Fara Duncan RN on 2/4/2019 at 11:49 AM

## 2019-02-09 ENCOUNTER — MYC REFILL (OUTPATIENT)
Dept: FAMILY MEDICINE | Facility: CLINIC | Age: 84
End: 2019-02-09

## 2019-02-09 DIAGNOSIS — N39.0 URINARY TRACT INFECTION WITHOUT HEMATURIA, SITE UNSPECIFIED: ICD-10-CM

## 2019-02-11 RX ORDER — NITROFURANTOIN 25; 75 MG/1; MG/1
100 CAPSULE ORAL 2 TIMES DAILY
Qty: 14 CAPSULE | Refills: 6 | Status: SHIPPED | OUTPATIENT
Start: 2019-02-11 | End: 2019-04-29

## 2019-02-11 NOTE — TELEPHONE ENCOUNTER
nitrofurantion  Last Written Prescription Date:  11/2/2018  Last Fill Quantity: 14,  # refills: 6   Last office visit: 11/2/2018 with prescribing provider:      Future Office Visit:      Requested Prescriptions   Pending Prescriptions Disp Refills     nitroFURantoin macrocrystal-monohydrate (MACROBID) 100 MG capsule 14 capsule 6     Sig: Take 1 capsule (100 mg) by mouth 2 times daily    There is no refill protocol information for this order          Routing refill request to provider for review/approval because:  Drug not on the [facility] refill protocol           Katie Angeles RN on 2/11/2019 at 3:07 PM

## 2019-02-18 DIAGNOSIS — I10 ESSENTIAL HYPERTENSION WITH GOAL BLOOD PRESSURE LESS THAN 140/90: ICD-10-CM

## 2019-02-18 DIAGNOSIS — N18.30 CKD (CHRONIC KIDNEY DISEASE) STAGE 3, GFR 30-59 ML/MIN (H): ICD-10-CM

## 2019-02-19 NOTE — TELEPHONE ENCOUNTER
"VOLTAREN  Last Written Prescription Date:  6/1/18, 180  Last Fill Quantity:  # refills: 180   Last office visit: 11/2/2018 with prescribing provider:  2   Future Office Visit:  NONE    Requested Prescriptions   Pending Prescriptions Disp Refills     diclofenac (VOLTAREN) 50 MG EC tablet [Pharmacy Med Name: DICLOFENAC SODIUM 50MG TBEC] 180 tablet 2     Sig: TAKE ONE TABLET BY MOUTH TWICE A DAY    NSAID Medications Failed - 2/18/2019  8:08 AM       Failed - Blood pressure under 140/90 in past 12 months    BP Readings from Last 3 Encounters:   12/21/18 148/58   11/02/18 112/60   10/26/18 110/54          Failed - Patient is age 6-64 years       Failed - Normal serum creatinine on file in past 12 months    Recent Labs   Lab Test 12/20/18  1135   CR 1.16*          Passed - Normal ALT on file in past 12 months    Recent Labs   Lab Test 12/20/18  1135   ALT 10          Passed - Normal AST on file in past 12 months    Recent Labs   Lab Test 12/20/18  1135   AST 12          Passed - Recent (12 mo) or future (30 days) visit within the authorizing provider's specialty    Patient had office visit in the last 12 months or has a visit in the next 30 days with authorizing provider or within the authorizing provider's specialty.  See \"Patient Info\" tab in inbasket, or \"Choose Columns\" in Meds & Orders section of the refill encounter.             Passed - Normal CBC on file in past 12 months    Recent Labs   Lab Test 12/20/18  1135   WBC 10.5   RBC 3.77*   HGB 9.5*   HCT 31.8*        Component      Latest Ref Rng & Units 8/21/2017 1/15/2018 11/2/2018 12/20/2018   Creatinine      0.52 - 1.04 mg/dL 0.90 0.79 1.11 (H) 1.16 (H)   GFR Estimate      >60 mL/min/1.73:m2 59 (L) 69 46 (L) 42 (L)   GFR Estimate If Black      >60 mL/min/1.73:m2 72 83 56 (L) 48 (L)          Passed - Medication is active on med list       Passed - No active pregnancy on record       Passed - No positive pregnancy test in past 12 months      Routing refill " request to provider for review/approval because:  Labs out of range:  Cr+ ( see above)   POLO Franklin

## 2019-02-25 DIAGNOSIS — M35.3 POLYMYALGIA RHEUMATICA (H): ICD-10-CM

## 2019-02-26 NOTE — TELEPHONE ENCOUNTER
Hydrocodone-acetaminophen  Last Written Prescription Date:  02/04/2019  Last Fill Quantity: 60,  # refills: 0   Last office visit: 11/2/2018 with prescribing provider:      Future Office Visit:      Requested Prescriptions   Pending Prescriptions Disp Refills     HYDROcodone-acetaminophen (NORCO) 5-325 MG tablet 60 tablet 0     Sig: TAKE 1 TABLET BY ORAL ROUTE EVERY 6 HOURS AS NEEDED FOR PAIN    There is no refill protocol information for this order          Routing refill request to provider for review/approval because:  Drug not on the [facility] refill protocol           Katie Angeles RN on 2/26/2019 at 5:34 PM

## 2019-02-27 RX ORDER — HYDROCODONE BITARTRATE AND ACETAMINOPHEN 5; 325 MG/1; MG/1
TABLET ORAL
Qty: 60 TABLET | Refills: 0 | Status: SHIPPED | OUTPATIENT
Start: 2019-02-27 | End: 2019-03-20

## 2019-03-04 DIAGNOSIS — E03.9 HYPOTHYROIDISM, UNSPECIFIED TYPE: ICD-10-CM

## 2019-03-06 RX ORDER — LEVOTHYROXINE SODIUM 100 UG/1
TABLET ORAL
Qty: 90 TABLET | Refills: 2 | Status: SHIPPED | OUTPATIENT
Start: 2019-03-06

## 2019-03-06 NOTE — TELEPHONE ENCOUNTER
"Requested Prescriptions   Pending Prescriptions Disp Refills     levothyroxine (SYNTHROID/LEVOTHROID) 100 MCG tablet [Pharmacy Med Name: LEVOTHYROXINE SODIUM 100MCG TABS] 90 tablet 3    Last Written Prescription Date:  2/5/18  Last Fill Quantity: 90,  # refills: 3   Last office visit: 11/2/2018 with prescribing provider:     Future Office Visit:     Sig: TAKE ONE TABLET BY MOUTH EVERY DAY    Thyroid Protocol Passed - 3/4/2019 11:54 AM       Passed - Patient is 12 years or older       Passed - Recent (12 mo) or future (30 days) visit within the authorizing provider's specialty    Patient had office visit in the last 12 months or has a visit in the next 30 days with authorizing provider or within the authorizing provider's specialty.  See \"Patient Info\" tab in inbasket, or \"Choose Columns\" in Meds & Orders section of the refill encounter.             Passed - Medication is active on med list       Passed - Normal TSH on file in past 12 months    Recent Labs   Lab Test 11/02/18  1440   TSH 0.61           Passed - No active pregnancy on record    If patient is pregnant or has had a positive pregnancy test, please check TSH.         Passed - No positive pregnancy test in past 12 months    If patient is pregnant or has had a positive pregnancy test, please check TSH.          Prescription approved per [facility] Refill Protocol.  Denisse Myers RN    "

## 2019-03-18 DIAGNOSIS — M35.3 POLYMYALGIA RHEUMATICA (H): ICD-10-CM

## 2019-03-19 NOTE — TELEPHONE ENCOUNTER
Norco 5-325 MG       Last Written Prescription Date:  2/27/19  Last Fill Quantity: 60,   # refills: 0  Last Office Visit: 11/2/18  Future Office visit:       Routing refill request to provider for review/approval because:  Drug not on the [facility], [facility] or [facility] refill protocol or controlled substance

## 2019-03-20 RX ORDER — HYDROCODONE BITARTRATE AND ACETAMINOPHEN 5; 325 MG/1; MG/1
TABLET ORAL
Qty: 60 TABLET | Refills: 0 | Status: SHIPPED | OUTPATIENT
Start: 2019-03-20 | End: 2019-04-08

## 2019-04-08 DIAGNOSIS — M35.3 POLYMYALGIA RHEUMATICA (H): ICD-10-CM

## 2019-04-08 RX ORDER — HYDROCODONE BITARTRATE AND ACETAMINOPHEN 5; 325 MG/1; MG/1
TABLET ORAL
Qty: 60 TABLET | Refills: 0 | Status: SHIPPED | OUTPATIENT
Start: 2019-04-08 | End: 2019-05-03

## 2019-04-08 NOTE — TELEPHONE ENCOUNTER
Norco 5-325 MG       Last Written Prescription Date:  3/20/19  Last Fill Quantity: 60,   # refills: 0  Last Office Visit: 11/2/18  Future Office visit:       Routing refill request to provider for review/approval because:  Drug not on the [facility], [facility] or [facility] refill protocol or controlled substance

## 2019-04-19 ENCOUNTER — APPOINTMENT (OUTPATIENT)
Dept: GENERAL RADIOLOGY | Facility: CLINIC | Age: 84
End: 2019-04-19
Attending: FAMILY MEDICINE
Payer: MEDICARE

## 2019-04-19 ENCOUNTER — HOSPITAL ENCOUNTER (EMERGENCY)
Facility: CLINIC | Age: 84
Discharge: HOME | End: 2019-04-20
Attending: FAMILY MEDICINE | Admitting: FAMILY MEDICINE
Payer: MEDICARE

## 2019-04-19 DIAGNOSIS — N39.0 URINARY TRACT INFECTION WITHOUT HEMATURIA, SITE UNSPECIFIED: ICD-10-CM

## 2019-04-19 DIAGNOSIS — J06.9 UPPER RESPIRATORY TRACT INFECTION, UNSPECIFIED TYPE: ICD-10-CM

## 2019-04-19 DIAGNOSIS — D64.9 ANEMIA, UNSPECIFIED TYPE: ICD-10-CM

## 2019-04-19 DIAGNOSIS — R41.0 DELIRIUM: ICD-10-CM

## 2019-04-19 LAB
ALBUMIN SERPL-MCNC: 3.5 G/DL (ref 3.4–5)
ALBUMIN UR-MCNC: NEGATIVE MG/DL
ALP SERPL-CCNC: 53 U/L (ref 40–150)
ALT SERPL W P-5'-P-CCNC: 9 U/L (ref 0–50)
ANION GAP SERPL CALCULATED.3IONS-SCNC: 8 MMOL/L (ref 3–14)
APPEARANCE UR: CLEAR
AST SERPL W P-5'-P-CCNC: 10 U/L (ref 0–45)
BACTERIA #/AREA URNS HPF: ABNORMAL /HPF
BASE EXCESS BLDV CALC-SCNC: 0.3 MMOL/L
BASOPHILS # BLD AUTO: 0 10E9/L (ref 0–0.2)
BASOPHILS NFR BLD AUTO: 0.1 %
BILIRUB SERPL-MCNC: 0.2 MG/DL (ref 0.2–1.3)
BILIRUB UR QL STRIP: NEGATIVE
BUN SERPL-MCNC: 26 MG/DL (ref 7–30)
CALCIUM SERPL-MCNC: 8.8 MG/DL (ref 8.5–10.1)
CHLORIDE SERPL-SCNC: 108 MMOL/L (ref 94–109)
CO2 SERPL-SCNC: 23 MMOL/L (ref 20–32)
COLOR UR AUTO: YELLOW
CREAT SERPL-MCNC: 1.13 MG/DL (ref 0.52–1.04)
DIFFERENTIAL METHOD BLD: ABNORMAL
EOSINOPHIL NFR BLD AUTO: 4 %
ERYTHROCYTE [DISTWIDTH] IN BLOOD BY AUTOMATED COUNT: 14.3 % (ref 10–15)
GFR SERPL CREATININE-BSD FRML MDRD: 43 ML/MIN/{1.73_M2}
GLUCOSE SERPL-MCNC: 99 MG/DL (ref 70–99)
GLUCOSE UR STRIP-MCNC: NEGATIVE MG/DL
HCO3 BLDV-SCNC: 26 MMOL/L (ref 21–28)
HCT VFR BLD AUTO: 26.4 % (ref 35–47)
HGB BLD-MCNC: 7.9 G/DL (ref 11.7–15.7)
HGB UR QL STRIP: ABNORMAL
HYALINE CASTS #/AREA URNS LPF: 1 /LPF (ref 0–2)
IMM GRANULOCYTES # BLD: 0.1 10E9/L (ref 0–0.4)
IMM GRANULOCYTES NFR BLD: 0.9 %
KETONES UR STRIP-MCNC: NEGATIVE MG/DL
LACTATE BLD-SCNC: 0.8 MMOL/L (ref 0.7–2)
LEUKOCYTE ESTERASE UR QL STRIP: NEGATIVE
LYMPHOCYTES # BLD AUTO: 2.9 10E9/L (ref 0.8–5.3)
LYMPHOCYTES NFR BLD AUTO: 37.3 %
MCH RBC QN AUTO: 23 PG (ref 26.5–33)
MCHC RBC AUTO-ENTMCNC: 29.9 G/DL (ref 31.5–36.5)
MCV RBC AUTO: 77 FL (ref 78–100)
MONOCYTES # BLD AUTO: 1 10E9/L (ref 0–1.3)
MONOCYTES NFR BLD AUTO: 12.7 %
MUCOUS THREADS #/AREA URNS LPF: PRESENT /LPF
NEUTROPHILS # BLD AUTO: 3.5 10E9/L (ref 1.6–8.3)
NEUTROPHILS NFR BLD AUTO: 45 %
NITRATE UR QL: POSITIVE
NRBC # BLD AUTO: 0 10*3/UL
NRBC BLD AUTO-RTO: 0 /100
O2/TOTAL GAS SETTING VFR VENT: 21 %
PCO2 BLDV: 44 MM HG (ref 40–50)
PH BLDV: 7.37 PH (ref 7.32–7.43)
PH UR STRIP: 5 PH (ref 5–7)
PLATELET # BLD AUTO: 227 10E9/L (ref 150–450)
PO2 BLDV: 22 MM HG (ref 25–47)
POTASSIUM SERPL-SCNC: 4 MMOL/L (ref 3.4–5.3)
PROT SERPL-MCNC: 7 G/DL (ref 6.8–8.8)
RBC # BLD AUTO: 3.43 10E12/L (ref 3.8–5.2)
RBC # BLD AUTO: NORMAL 10E12/L (ref 3.8–5.2)
RBC #/AREA URNS AUTO: 1 /HPF (ref 0–2)
SODIUM SERPL-SCNC: 139 MMOL/L (ref 133–144)
SOURCE: ABNORMAL
SP GR UR STRIP: 1.02 (ref 1–1.03)
TROPONIN I SERPL-MCNC: <0.015 UG/L (ref 0–0.04)
UROBILINOGEN UR STRIP-MCNC: 0 MG/DL (ref 0–2)
WBC # BLD AUTO: 7.8 10E9/L (ref 4–11)
WBC # BLD AUTO: NORMAL 10E9/L (ref 4–11)
WBC #/AREA URNS AUTO: 10 /HPF (ref 0–5)

## 2019-04-19 PROCEDURE — 25000128 H RX IP 250 OP 636: Performed by: FAMILY MEDICINE

## 2019-04-19 PROCEDURE — 82803 BLOOD GASES ANY COMBINATION: CPT | Performed by: FAMILY MEDICINE

## 2019-04-19 PROCEDURE — 87088 URINE BACTERIA CULTURE: CPT | Performed by: FAMILY MEDICINE

## 2019-04-19 PROCEDURE — 81001 URINALYSIS AUTO W/SCOPE: CPT | Performed by: FAMILY MEDICINE

## 2019-04-19 PROCEDURE — 36430 TRANSFUSION BLD/BLD COMPNT: CPT | Performed by: FAMILY MEDICINE

## 2019-04-19 PROCEDURE — 99285 EMERGENCY DEPT VISIT HI MDM: CPT | Mod: Z6 | Performed by: FAMILY MEDICINE

## 2019-04-19 PROCEDURE — 84484 ASSAY OF TROPONIN QUANT: CPT | Performed by: FAMILY MEDICINE

## 2019-04-19 PROCEDURE — 86901 BLOOD TYPING SEROLOGIC RH(D): CPT | Performed by: FAMILY MEDICINE

## 2019-04-19 PROCEDURE — 36416 COLLJ CAPILLARY BLOOD SPEC: CPT | Performed by: FAMILY MEDICINE

## 2019-04-19 PROCEDURE — 83605 ASSAY OF LACTIC ACID: CPT | Performed by: FAMILY MEDICINE

## 2019-04-19 PROCEDURE — 96361 HYDRATE IV INFUSION ADD-ON: CPT | Performed by: FAMILY MEDICINE

## 2019-04-19 PROCEDURE — 71045 X-RAY EXAM CHEST 1 VIEW: CPT | Mod: TC

## 2019-04-19 PROCEDURE — 80053 COMPREHEN METABOLIC PANEL: CPT | Performed by: FAMILY MEDICINE

## 2019-04-19 PROCEDURE — 96360 HYDRATION IV INFUSION INIT: CPT | Performed by: FAMILY MEDICINE

## 2019-04-19 PROCEDURE — 99285 EMERGENCY DEPT VISIT HI MDM: CPT | Mod: 25 | Performed by: FAMILY MEDICINE

## 2019-04-19 PROCEDURE — 86900 BLOOD TYPING SEROLOGIC ABO: CPT | Performed by: FAMILY MEDICINE

## 2019-04-19 PROCEDURE — 87186 SC STD MICRODIL/AGAR DIL: CPT | Performed by: FAMILY MEDICINE

## 2019-04-19 PROCEDURE — 87086 URINE CULTURE/COLONY COUNT: CPT | Performed by: FAMILY MEDICINE

## 2019-04-19 PROCEDURE — 85025 COMPLETE CBC W/AUTO DIFF WBC: CPT | Performed by: FAMILY MEDICINE

## 2019-04-19 PROCEDURE — 86923 COMPATIBILITY TEST ELECTRIC: CPT | Performed by: FAMILY MEDICINE

## 2019-04-19 PROCEDURE — 87040 BLOOD CULTURE FOR BACTERIA: CPT | Performed by: FAMILY MEDICINE

## 2019-04-19 PROCEDURE — 86850 RBC ANTIBODY SCREEN: CPT | Performed by: FAMILY MEDICINE

## 2019-04-19 RX ORDER — HYDROCODONE BITARTRATE AND ACETAMINOPHEN 5; 325 MG/1; MG/1
1 TABLET ORAL ONCE
Status: COMPLETED | OUTPATIENT
Start: 2019-04-20 | End: 2019-04-20

## 2019-04-19 RX ADMIN — SODIUM CHLORIDE 1000 ML: 9 INJECTION, SOLUTION INTRAVENOUS at 22:03

## 2019-04-19 NOTE — ED AVS SNAPSHOT
[facility] Emergency Department  [address] [place] [address]  Phone:  [phone]  Fax:  [phone]                                    Anaid Dockery   MRN: [id number]    Department:  [facility] Emergency Department   Date of Visit:  4/19/2019           After Visit Summary Signature Page    I have received my discharge instructions, and my questions have been answered. I have discussed any challenges I see with this plan with the nurse or doctor.    ..........................................................................................................................................  Patient/Patient Representative Signature      ..........................................................................................................................................  Patient Representative Print Name and Relationship to Patient    ..................................................               ................................................  Date                                   Time    ..........................................................................................................................................  Reviewed by Signature/Title    ...................................................              ..............................................  Date                                               Time          22EPIC Rev 08/18

## 2019-04-20 VITALS
SYSTOLIC BLOOD PRESSURE: 133 MMHG | OXYGEN SATURATION: 96 % | DIASTOLIC BLOOD PRESSURE: 68 MMHG | BODY MASS INDEX: 21.92 KG/M2 | WEIGHT: 135.8 LBS | HEART RATE: 67 BPM | TEMPERATURE: 97.5 F | RESPIRATION RATE: 18 BRPM

## 2019-04-20 LAB
ABO + RH BLD: NORMAL
ABO + RH BLD: NORMAL
BLD GP AB SCN SERPL QL: NORMAL
BLD PROD TYP BPU: NORMAL
BLD PROD TYP BPU: NORMAL
BLD UNIT ID BPU: 0
BLOOD BANK CMNT PATIENT-IMP: NORMAL
BLOOD PRODUCT CODE: NORMAL
BPU ID: NORMAL
NUM BPU REQUESTED: 1
SPECIMEN EXP DATE BLD: NORMAL
TRANSFUSION STATUS PATIENT QL: NORMAL
TRANSFUSION STATUS PATIENT QL: NORMAL

## 2019-04-20 PROCEDURE — A9270 NON-COVERED ITEM OR SERVICE: HCPCS | Mod: GY | Performed by: FAMILY MEDICINE

## 2019-04-20 PROCEDURE — 25000132 ZZH RX MED GY IP 250 OP 250 PS 637: Mod: GY | Performed by: FAMILY MEDICINE

## 2019-04-20 PROCEDURE — P9016 RBC LEUKOCYTES REDUCED: HCPCS | Performed by: FAMILY MEDICINE

## 2019-04-20 PROCEDURE — 36430 TRANSFUSION BLD/BLD COMPNT: CPT | Performed by: FAMILY MEDICINE

## 2019-04-20 RX ORDER — NITROFURANTOIN 25; 75 MG/1; MG/1
100 CAPSULE ORAL ONCE
Status: DISCONTINUED | OUTPATIENT
Start: 2019-04-20 | End: 2019-04-20

## 2019-04-20 RX ORDER — CEFDINIR 300 MG/1
300 CAPSULE ORAL 2 TIMES DAILY
Status: DISCONTINUED | OUTPATIENT
Start: 2019-04-20 | End: 2019-04-20 | Stop reason: HOSPADM

## 2019-04-20 RX ORDER — CEFDINIR 300 MG/1
300 CAPSULE ORAL 2 TIMES DAILY
Qty: 13 CAPSULE | Refills: 0 | Status: ON HOLD | OUTPATIENT
Start: 2019-04-20 | End: 2019-04-30

## 2019-04-20 RX ADMIN — CEFDINIR 300 MG: 300 CAPSULE ORAL at 00:38

## 2019-04-20 RX ADMIN — HYDROCODONE BITARTRATE AND ACETAMINOPHEN 1 TABLET: 5; 325 TABLET ORAL at 00:05

## 2019-04-20 NOTE — ED NOTES
"Straight cath for urine complete. Patient tolerated fairly well.  Complained of pain with movement. Resting comfortably now.   Patient's jose-area is clean and dry.  No evidence for pressure ulceration noted.  Patient's brief changed.  Daughter reports \"she refuses to be incontinent if at all possible.\"  "

## 2019-04-20 NOTE — ED TRIAGE NOTES
Pt presents by EMS with concerns altered mental status.  Pt lives in a group home.  Pt has had a cough for the last few days.  Pt will respond to voice, but not acknowledge people.  Pt is altered per family.

## 2019-04-20 NOTE — ED NOTES
Report received from Brandie CUELLAR.  Waiting for blood from blood bank.  Consent signed by daughter.

## 2019-04-20 NOTE — DISCHARGE INSTRUCTIONS
You have a respiratory and urinary tract infection.  Your hemoglobin is low, and this may be contributing to your weakness, and difficulty oxygenating.  You received a transfusion of packed red blood cells.  You received her first dose of antibiotic with Omnicef to treat the infections.  Continue Omnicef 300 mg twice a day for 13 more doses (6-1/2 days)  A urine culture and blood culture pending.  We will notify you if there is a change in your plan of care.  Follow-up with Dr. Kim if not improving in 3-5 days.  Return to the emergency department at any time if your symptoms worsen.

## 2019-04-20 NOTE — ED PROVIDER NOTES
[facility] ED Provider Note   Patient: Anaid Dockery  MRN #:  [id number]  Date of Visit: April 19, 2019    CC:     Chief Complaint   Patient presents with     Altered Mental Status     History is limited due to the patient's altered mental status, and dementia.    HPI:  Anaid Dockery is a 89 year old female who presented to the emergency department by EMS with altered mental status today, with 2-day history of cough.  Patient apparently resides in a group home.  Patient has had previous episodes of unresponsive episode, but today she did not seem to respond, had a cough, had some rattling in her chest, and possibly episodes of low oxygen level.  Patient has a history of chronic UTI, anemia, chronic pain, depression, hypothyroidism, and dementia.  She has been picking at things, and not acting like herself.  Paramedics report that she felt warm.  Patient had a previous unresponsive episode requiring hospitalization in December which was characterized by nausea and vomiting at that time    Problem List:  Patient Active Problem List    Diagnosis Date Noted     Unresponsive episode 12/20/2018     Priority: Medium     Syncope 12/20/2018     Priority: Medium     Chronic UTI 02/05/2018     Priority: Medium     Hypopotassemia 02/12/2017     Priority: Medium     Anemia 02/12/2017     Priority: Medium     Generalized muscle weakness 02/11/2017     Priority: Medium     Hip pain, right 09/07/2016     Priority: Medium     Chronic pain syndrome 09/07/2016     Priority: Medium     Hx of bladder infections 06/28/2016     Priority: Medium     Hypothyroidism, unspecified type 06/08/2016     Priority: Medium     Dementia without behavioral disturbance, unspecified dementia type 11/25/2015     Priority: Medium     Major depressive disorder, recurrent episode, mild (H) 11/25/2015     Priority: Medium     Risk for falls 11/25/2015     Priority: Medium      Persistent insomnia 08/20/2014     Priority: Medium     Chronic foot pain/bilateral 09/20/2012     Priority: Medium     `       Advance Care Planning 04/20/2012     Priority: Medium     Advance Care Planning 11/15/2016: Receipt of ACP document:  Received: POLST which was signed and dated by provider on 9-8-16.  Document previously scanned on 9-12-16.  Order reviewed and found to be valid.  Code Status needs to be updated to reflect choices in most recent ACP document. Orders are DNR only with trial of inbuation.  Confirmed/documented designated decision maker(s).  Added by Jyoti Angeles RN Advance Care Planning Liaison with [facility]  Advance Care Planning 11/15/2016: Receipt of ACP document:  Received: Health Care Directive which was witnessed or notarized on 3-24-14.  Document previously scanned on 9-20-16.  Validation form completed and sent to be scanned.  Code Status needs to be updated to reflect choices in most recent ACP document. Confirmed/documented designated decision maker(s).  Added by Jyoti Angeles RN Advance Care Planning Liaison with [facility]  Discussed advance care planning with patient; information given to patient to review.//Marcia Shay/[place](AAMA)  4/20/2012          S/P hip replacement 04/20/2012     Priority: Medium     right hip       CKD (chronic kidney disease) stage 3, GFR 30-59 ml/min (H) 12/27/2011     Priority: Medium     Essential hypertension with goal blood pressure less than 140/90 06/27/2011     Priority: Medium     Balance problem 03/24/2011     Priority: Medium     Hip pain 03/24/2011     Priority: Medium     DJD (degenerative joint disease), lumbar 11/10/2009     Priority: Medium     Chronic right shoulder pain 03/04/2009     Priority: Medium     Chronic left shoulder pain 03/04/2009     Priority: Medium     Hard of hearing 10/21/2008     Priority: Medium     Flatulence, eructation, and gas pain 02/12/2008     Priority: Medium     Osteoporosis 04/05/2007      Priority: Medium     Problem list name updated by automated process. Provider to review       Gastroesophageal reflux disease with esophagitis 02/15/2005     Priority: Medium     Slow transit constipation 12/06/2003     Priority: Medium     Asymptomatic varicose veins 08/21/2001     Priority: Medium       Past Medical History:   Diagnosis Date     Enthesopathy of hip region      Lump or mass in breast 4/12/2006     Myalgia and myositis, unspecified      NONSPECIFIC MEDICAL HISTORY      NONSPECIFIC MEDICAL HISTORY      Osteoporosis, unspecified      Other kyphoscoliosis and scoliosis      Sicca syndrome (H)      Thyrotoxicosis without mention of goiter or other cause, without mention of thyrotoxic crisis or storm        MEDS:     Acetaminophen (ACTAMIN MAXIMUM STRENGTH OR)   cefdinir (OMNICEF) 300 MG capsule   diclofenac (VOLTAREN) 50 MG EC tablet   docusate sodium (COLACE) 100 MG capsule   donepezil (ARICEPT) 10 MG tablet   FLUoxetine (PROZAC) 20 MG capsule   HYDROcodone-acetaminophen (NORCO) 5-325 MG tablet   levothyroxine (SYNTHROID/LEVOTHROID) 100 MCG tablet   metoprolol tartrate (LOPRESSOR) 25 MG tablet   polyethylene glycol (MIRALAX/GLYCOLAX) packet   QUEtiapine (SEROQUEL) 25 MG tablet   ranitidine (ZANTAC) 300 MG tablet   MULTI-VITAMIN TABS   OR   nitroFURantoin macrocrystal-monohydrate (MACROBID) 100 MG capsule   order for DME       ALLERGIES:    Allergies   Allergen Reactions     Levofloxacin Other (See Comments)     Agitation and combativeness     Bactrim [Sulfamethoxazole W/Trimethoprim]      Codeine Itching     endocet/percocet caused     Cortisone Acetate      20 years ago doesn't remember exact side effect. Has tolerated steroid injection into joints. 6/2008 noted     Oxycodone Itching       Past Surgical History:   Procedure Laterality Date     BIOPSY SALIVARY GLAND,INCISIONAL  03/21/2007    Inferior lip/     C ANESTH,REPAIR LO HERNIA VENTR/INCIS  1970's     C NONSPECIFIC PROCEDURE  04/09/2004     Lumbar epidural steroid injection via the posterior interlaminar approach at the follwoing spina level: L4-5.  The procedure was performed with fluoroscopic guidance.  ([facility]     C REPAIR OF RECTOCELE  8/20/99     C TOTAL ABDOM HYSTERECTOMY  1970's    Hysterectomy, Total Abdominal     C TOTAL HIP ARTHROPLASTY  7/04    Hip Replacement, Total, right     COLONOSCOPY  7/16/2013    Procedure: COLONOSCOPY;  colonoscopy;  Surgeon: Tiago Lowery MD;  Location: [facility]     HC ANTER COLPORRHAPHY,BLAD/VAGINA  8/20/99    Cystocele Repair     HC COLONOSCOPY THRU STOMA, DIAGNOSTIC  4/04     HC CORRECT BUNION,SIMPLE       HC DESTRUCT FACET JT NERVE, LUMB/SACR SINGLE LEVEL  2009     HC DRAIN/INJ MAJOR JOINT/BURSA W/O US  2009    Left shoulder     HC INJ EPIDURAL LUMBAR/SACRAL W/WO CONTRAST  2008    lumbar epidural steroid injection via posterior interlaminar approach at following spinal level-L4-5     HC PLACE NEEDLE WIRE PREOP, BREAST, INITIAL  04/07/06    Wire locallization of right  breast     HC REMOVAL OF LEG VEINS/ULCER      vein stripping     HC REMV CATARACT EXTRACAP,INSERT LENS  7/17/2003    right     HC UGI ENDOSCOPY DIAG W OR W/O BRUSH/WASH  03/08/2006       Social History     Tobacco Use     Smoking status: Never Smoker     Smokeless tobacco: Never Used     Tobacco comment: no smokers in the household   Substance Use Topics     Alcohol use: No     Drug use: No         Review of Systems   Except as noted in HPI, all other systems were reviewed and are negative    Physical Exam     Vitals were reviewed  Patient Vitals for the past 12 hrs:   BP Temp Temp src Pulse Heart Rate Resp SpO2 Weight   04/20/19 0026 134/70 98.1  F (36.7  C) -- 79 -- 20 -- --   04/19/19 2124 110/78 98  F (36.7  C) Temporal -- 80 19 96 % 61.6 kg (135 lb 12.8 oz)     GENERAL APPEARANCE: Patient is picking at things, initially somewhat responsive with waving  FACE: normal facies  EYES: Pupils are equal and pinpoint; patient has  her eyes closed  HENT: normal external exam; mucous membranes are dry  NECK: no adenopathy or asymmetry  RESP: normal respiratory effort; bilateral crackles and rhonchi  CV: regular rate and rhythm; no significant murmurs, gallops or rubs  ABD: soft, no tenderness; no rebound or guarding; bowel sounds are normal  MS: no gross deformities noted; normal muscle tone.  EXT: No calf tenderness or pitting edema  SKIN: Pale color, dry skin, intact, no lesions  NEURO: no facial droop; no focal deficits,   PSYCH: Unable to test      Available Lab/Imaging Results     Results for orders placed or performed during the hospital encounter of 04/19/19 (from the past 24 hour(s))   CBC with platelets differential   Result Value Ref Range    WBC Canceled, Test credited 4.0 - 11.0 10e9/L    RBC Count Canceled, Test credited 3.8 - 5.2 10e12/L   Comprehensive metabolic panel   Result Value Ref Range    Sodium 139 133 - 144 mmol/L    Potassium 4.0 3.4 - 5.3 mmol/L    Chloride 108 94 - 109 mmol/L    Carbon Dioxide 23 20 - 32 mmol/L    Anion Gap 8 3 - 14 mmol/L    Glucose 99 70 - 99 mg/dL    Urea Nitrogen 26 7 - 30 mg/dL    Creatinine 1.13 (H) 0.52 - 1.04 mg/dL    GFR Estimate 43 (L) >60 mL/min/[1.73_m2]    GFR Estimate If Black 50 (L) >60 mL/min/[1.73_m2]    Calcium 8.8 8.5 - 10.1 mg/dL    Bilirubin Total 0.2 0.2 - 1.3 mg/dL    Albumin 3.5 3.4 - 5.0 g/dL    Protein Total 7.0 6.8 - 8.8 g/dL    Alkaline Phosphatase 53 40 - 150 U/L    ALT 9 0 - 50 U/L    AST 10 0 - 45 U/L   Troponin I   Result Value Ref Range    Troponin I ES <0.015 0.000 - 0.045 ug/L   Lactic acid whole blood   Result Value Ref Range    Lactic Acid 0.8 0.7 - 2.0 mmol/L   Blood gas venous   Result Value Ref Range    Ph Venous 7.37 7.32 - 7.43 pH    PCO2 Venous 44 40 - 50 mm Hg    PO2 Venous 22 (L) 25 - 47 mm Hg    Bicarbonate Venous 26 21 - 28 mmol/L    Base Excess Venous 0.3 mmol/L    FIO2 21    ABO/Rh type and screen   Result Value Ref Range    Units Ordered 1     ABO A      RH(D) Neg     Antibody Screen Neg     Test Valid Only At [facility]        Specimen Expires 04/22/2019     Crossmatch Red Blood Cells    Blood component   Result Value Ref Range    Unit Number [id number]     Blood Component Type Red Blood Cells Leukocyte Reduced     Division Number 00     Status of Unit Released to care unit 04/20/2019 0024     Blood Product Code [id number]     Unit Status ISS    XR Chest Port 1 View    Narrative    XR CHEST PORT 1 VW  4/19/2019 10:16 PM     HISTORY:  cough, altered mental status    COMPARISON: Film dated 12/20/2018    FINDINGS:  Heart and pulmonary vasculature are normal. The aorta is  tortuous. There is a small 1.1 cm nodule just lateral to the left  hilum. This appears to be more prominent than on a film from 2016 and  appears new since a film from 2008. A CT scan could be performed 4  better characterization of this nodule.. Severe degenerative change  left shoulder joint.      Impression    IMPRESSION:  1. No active infiltrates.   2. Indeterminate 1.1 cm nodule just lateral to the left hilum.  Consider CT for further characterization.    FLOR VELA MD   CBC with platelets differential   Result Value Ref Range    WBC 7.8 4.0 - 11.0 10e9/L    RBC Count 3.43 (L) 3.8 - 5.2 10e12/L    Hemoglobin 7.9 (L) 11.7 - 15.7 g/dL    Hematocrit 26.4 (L) 35.0 - 47.0 %    MCV 77 (L) 78 - 100 fl    MCH 23.0 (L) 26.5 - 33.0 pg    MCHC 29.9 (L) 31.5 - 36.5 g/dL    RDW 14.3 10.0 - 15.0 %    Platelet Count 227 150 - 450 10e9/L    Diff Method Automated Method     % Neutrophils 45.0 %    % Lymphocytes 37.3 %    % Monocytes 12.7 %    % Eosinophils 4.0 %    % Basophils 0.1 %    % Immature Granulocytes 0.9 %    Nucleated RBCs 0 0 /100    Absolute Neutrophil 3.5 1.6 - 8.3 10e9/L    Absolute Lymphocytes 2.9 0.8 - 5.3 10e9/L    Absolute Monocytes 1.0 0.0 - 1.3 10e9/L    Absolute Basophils 0.0 0.0 - 0.2 10e9/L    Abs Immature Granulocytes 0.1 0 - 0.4 10e9/L    Absolute Nucleated RBC 0.0     UA reflex to Microscopic   Result Value Ref Range    Color Urine Yellow     Appearance Urine Clear     Glucose Urine Negative NEG^Negative mg/dL    Bilirubin Urine Negative NEG^Negative    Ketones Urine Negative NEG^Negative mg/dL    Specific Gravity Urine 1.019 1.003 - 1.035    Blood Urine Moderate (A) NEG^Negative    pH Urine 5.0 5.0 - 7.0 pH    Protein Albumin Urine Negative NEG^Negative mg/dL    Urobilinogen mg/dL 0.0 0.0 - 2.0 mg/dL    Nitrite Urine Positive (A) NEG^Negative    Leukocyte Esterase Urine Negative NEG^Negative    Source Catheterized Urine     RBC Urine 1 0 - 2 /HPF    WBC Urine 10 (H) 0 - 5 /HPF    Bacteria Urine Many (A) NEG^Negative /HPF    Mucous Urine Present (A) NEG^Negative /LPF    Hyaline Casts 1 0 - 2 /LPF     *Note: Due to a large number of results and/or encounters for the requested time period, some results have not been displayed. A complete set of results can be found in Results Review.                Impression     Final diagnoses:   Delirium   Upper respiratory tract infection, unspecified type   Anemia   Urinary tract infection         ED Course & Medical Decision Making   Anaid Dockery is a 89 year old female who presented to the emergency department by EMS with altered mental status, an episode of low oxygen saturations, and cough with possible rattling in the chest.  Patient was seen shortly after arrival.  She seemed to have acute delirium.  I do not know what her baseline is but she does have dementia and there apparently is a change in her mental status.  She has been prone to chronic urinary tract infections, but currently has a cough with some rattling in the chest.  Patient's vital signs revealed a temperature of 98 degrees blood pressure is 110/78, heart rate of 80, respiratory rate of 19 with 96% oxygen saturations on room air.  Patient appears pale, and had decreased level of responsiveness.  She was able to interact slightly by waving.  She has speech that is  difficult to comprehend.  We initiated a workup given the possibility that she may have pneumonia and urinary tract infection as a cause for her altered mental status.  CBC reveals a white blood count of 7.8 with 45% neutrophils and 37% lymphocytes.  Hemoglobin is 7.9.  Chest x-ray reveals no acute infiltrates.  Troponin is less than 0.015 lactic acid level is 0.8.  Comprehensive metabolic panel is normal except for creatinine of 1.13.  Venous blood gas was reassuring.  The patient's daughter arrived shortly afterwards and I was able to obtain some additional history.  She has been on several courses of Macrobid for chronic UTIs.      11:15 PM: I discussed the results of the patient's blood work including her low hemoglobin of 7.9.  This is gradually decreased dramatically over the last several months.  The patient's daughter weighed the risks and benefits, and given that the patient is weak, unable to clear her chest congestion, and may be symptomatic from her anemia, will would like to proceed with the transfusion of packed red blood cells.  Patient was slightly agitated, and is due for her nighttime Vicodin.  The daughter states that this does help when she gets this way.      12:35 AM: Packed red blood cells are now being transfused.  Urinalysis reveals many bacteria, and 10 white blood cells.  There are positive nitrites.  Findings are consistent with a urinary tract infection.  Given that she is having a concurrent respiratory infection, we will cover the patient with broad-spectrum antibiotics of Omnicef.  I discussed this with the patient's daughter.  Continue Omnicef twice a day for 13 more doses.    1:55 AM: The patient's 1 unit of packed red blood cells has been transfused.  We will not necessarily recheck her hemoglobin at this time.  Consider outpatient occult blood testing, if Dr. Kim feels that this is warranted.  Discuss possible iron deficiency and supplementation.  The patient's daughter did not  have any additional questions.      Patient is stable for discharge home.  Follow-up with Dr. Kim in 3-5 days if not improving.  Return to the ED at any time if symptoms worsen.             Written after-visit summary and instructions were given at the time of discharge.    Follow up Plan:   Tai Kim MD  [address] [place] [id number] [phone]    In 5 days  if not improving    [facility] Emergency Department  [address] [place] [address]  [phone]    If symptoms worsen      Discharge Medications: Omnicef 300 mg twice a day for 13 more doses       This note was dictated using electronic voice recognition software and although reviewed, may contain grammatical and spelling errors.        Minnie Woodall MD  04/20/19 0158

## 2019-04-21 LAB
BACTERIA SPEC CULT: ABNORMAL
Lab: ABNORMAL
SPECIMEN SOURCE: ABNORMAL

## 2019-04-22 NOTE — RESULT ENCOUNTER NOTE
Final Urine Culture Report on 4/21/19  Emergency Dept/Urgent Care discharge antibiotic prescribed: Cefdinir (Omnicef) 300 mg capsule, 1 capsule (300 mg) by mouth 2 times daily for 7 days  #1. Bacteria, >100,000 colonies/mL Escherichia coli, is SUSCEPTIBLE to Antibiotic.    As per [facility] ED Lab Result protocol, no change in antibiotic therapy.

## 2019-04-24 DIAGNOSIS — F03.90 DEMENTIA WITHOUT BEHAVIORAL DISTURBANCE, UNSPECIFIED DEMENTIA TYPE: ICD-10-CM

## 2019-04-25 RX ORDER — DONEPEZIL HYDROCHLORIDE 10 MG/1
TABLET, FILM COATED ORAL
Qty: 90 TABLET | Refills: 1 | Status: SHIPPED | OUTPATIENT
Start: 2019-04-25

## 2019-04-25 NOTE — TELEPHONE ENCOUNTER
"Requested Prescriptions   Pending Prescriptions Disp Refills     donepezil (ARICEPT) 10 MG tablet [Pharmacy Med Name: DONEPEZIL HCL 10MG TABS] 90 tablet 1     Sig: TAKE ONE TABLET BY MOUTH EVERY NIGHT AT BEDTIME   Last Written Prescription Date:  10/31/18  Last Fill Quantity: 90,  # refills: 1   Last office visit: 11/2/2018 with prescribing provider:     Future Office Visit:        Miscellaneous Dementia Agents Passed - 4/24/2019 10:46 AM        Passed - Recent (12 mo) or future (30 days) visit within the authorizing provider's specialty     Patient had office visit in the last 12 months or has a visit in the next 30 days with authorizing provider or within the authorizing provider's specialty.  See \"Patient Info\" tab in inbasket, or \"Choose Columns\" in Meds & Orders section of the refill encounter.              Passed - Medication is active on med list        Passed - Patient is 18 years of age or older      Prescription approved per [facility] Refill Protocol.  Denisse Myers RN      "

## 2019-04-26 LAB
BACTERIA SPEC CULT: NO GROWTH
Lab: NORMAL
SPECIMEN SOURCE: NORMAL

## 2019-04-26 NOTE — RESULT ENCOUNTER NOTE
Final blood culture report is NEGATIVE.    No treatment or change in treatment per [facility] ED Lab Result protocol.

## 2019-04-29 ENCOUNTER — APPOINTMENT (OUTPATIENT)
Dept: GENERAL RADIOLOGY | Facility: CLINIC | Age: 84
End: 2019-04-29
Attending: EMERGENCY MEDICINE
Payer: MEDICARE

## 2019-04-29 ENCOUNTER — APPOINTMENT (OUTPATIENT)
Dept: CT IMAGING | Facility: CLINIC | Age: 84
End: 2019-04-29
Attending: EMERGENCY MEDICINE
Payer: MEDICARE

## 2019-04-29 ENCOUNTER — HOSPITAL ENCOUNTER (OUTPATIENT)
Facility: CLINIC | Age: 84
Setting detail: OBSERVATION
Discharge: HOME | End: 2019-04-30
Attending: EMERGENCY MEDICINE | Admitting: HOSPITALIST
Payer: MEDICARE

## 2019-04-29 DIAGNOSIS — N39.0 URINARY TRACT INFECTION WITHOUT HEMATURIA, SITE UNSPECIFIED: ICD-10-CM

## 2019-04-29 DIAGNOSIS — F03.90 DEMENTIA WITHOUT BEHAVIORAL DISTURBANCE, UNSPECIFIED DEMENTIA TYPE: Primary | ICD-10-CM

## 2019-04-29 DIAGNOSIS — R53.83 LETHARGY: ICD-10-CM

## 2019-04-29 PROBLEM — R40.4 UNRESPONSIVE EPISODE: Status: ACTIVE | Noted: 2018-12-20

## 2019-04-29 LAB
ALBUMIN SERPL-MCNC: 2.8 G/DL (ref 3.4–5)
ALBUMIN UR-MCNC: NEGATIVE MG/DL
ALP SERPL-CCNC: 91 U/L (ref 40–150)
ALT SERPL W P-5'-P-CCNC: 12 U/L (ref 0–50)
ANION GAP SERPL CALCULATED.3IONS-SCNC: 10 MMOL/L (ref 3–14)
APPEARANCE UR: ABNORMAL
AST SERPL W P-5'-P-CCNC: 14 U/L (ref 0–45)
BACTERIA #/AREA URNS HPF: ABNORMAL /HPF
BASE DEFICIT BLDV-SCNC: 1.4 MMOL/L
BASOPHILS # BLD AUTO: 0 10E9/L (ref 0–0.2)
BASOPHILS NFR BLD AUTO: 0.2 %
BILIRUB SERPL-MCNC: 0.3 MG/DL (ref 0.2–1.3)
BILIRUB UR QL STRIP: NEGATIVE
BUN SERPL-MCNC: 17 MG/DL (ref 7–30)
CALCIUM SERPL-MCNC: 8.8 MG/DL (ref 8.5–10.1)
CHLORIDE SERPL-SCNC: 107 MMOL/L (ref 94–109)
CO2 SERPL-SCNC: 23 MMOL/L (ref 20–32)
COLOR UR AUTO: YELLOW
CREAT SERPL-MCNC: 0.91 MG/DL (ref 0.52–1.04)
DIFFERENTIAL METHOD BLD: ABNORMAL
EOSINOPHIL NFR BLD AUTO: 2.2 %
ERYTHROCYTE [DISTWIDTH] IN BLOOD BY AUTOMATED COUNT: 16.6 % (ref 10–15)
GFR SERPL CREATININE-BSD FRML MDRD: 56 ML/MIN/{1.73_M2}
GLUCOSE SERPL-MCNC: 84 MG/DL (ref 70–99)
GLUCOSE UR STRIP-MCNC: NEGATIVE MG/DL
HCO3 BLDV-SCNC: 24 MMOL/L (ref 21–28)
HCT VFR BLD AUTO: 29 % (ref 35–47)
HGB BLD-MCNC: 8.7 G/DL (ref 11.7–15.7)
HGB UR QL STRIP: NEGATIVE
IMM GRANULOCYTES # BLD: 0.1 10E9/L (ref 0–0.4)
IMM GRANULOCYTES NFR BLD: 1.1 %
INR PPP: 1.08 (ref 0.86–1.14)
KETONES UR STRIP-MCNC: 5 MG/DL
LACTATE BLD-SCNC: 1.1 MMOL/L (ref 0.7–2)
LEUKOCYTE ESTERASE UR QL STRIP: ABNORMAL
LIPASE SERPL-CCNC: 96 U/L (ref 73–393)
LYMPHOCYTES # BLD AUTO: 2.9 10E9/L (ref 0.8–5.3)
LYMPHOCYTES NFR BLD AUTO: 25.7 %
MCH RBC QN AUTO: 24.9 PG (ref 26.5–33)
MCHC RBC AUTO-ENTMCNC: 30 G/DL (ref 31.5–36.5)
MCV RBC AUTO: 83 FL (ref 78–100)
MONOCYTES # BLD AUTO: 0.7 10E9/L (ref 0–1.3)
MONOCYTES NFR BLD AUTO: 6.4 %
MUCOUS THREADS #/AREA URNS LPF: PRESENT /LPF
NEUTROPHILS # BLD AUTO: 7.2 10E9/L (ref 1.6–8.3)
NEUTROPHILS NFR BLD AUTO: 64.4 %
NITRATE UR QL: NEGATIVE
NRBC # BLD AUTO: 0 10*3/UL
NRBC BLD AUTO-RTO: 0 /100
O2/TOTAL GAS SETTING VFR VENT: 21 %
PCO2 BLDV: 41 MM HG (ref 40–50)
PH BLDV: 7.37 PH (ref 7.32–7.43)
PH UR STRIP: 5 PH (ref 5–7)
PLATELET # BLD AUTO: 435 10E9/L (ref 150–450)
PO2 BLDV: 27 MM HG (ref 25–47)
POTASSIUM SERPL-SCNC: 4.1 MMOL/L (ref 3.4–5.3)
PROCALCITONIN SERPL-MCNC: 0.12 NG/ML
PROT SERPL-MCNC: 7.4 G/DL (ref 6.8–8.8)
RBC # BLD AUTO: 3.5 10E12/L (ref 3.8–5.2)
RBC #/AREA URNS AUTO: 11 /HPF (ref 0–2)
SODIUM SERPL-SCNC: 140 MMOL/L (ref 133–144)
SOURCE: ABNORMAL
SP GR UR STRIP: 1.02 (ref 1–1.03)
SQUAMOUS #/AREA URNS AUTO: 3 /HPF (ref 0–1)
TROPONIN I SERPL-MCNC: <0.015 UG/L (ref 0–0.04)
UROBILINOGEN UR STRIP-MCNC: 0 MG/DL (ref 0–2)
WBC # BLD AUTO: 11.2 10E9/L (ref 4–11)
WBC #/AREA URNS AUTO: 7 /HPF (ref 0–5)
YEAST #/AREA URNS HPF: ABNORMAL /HPF

## 2019-04-29 PROCEDURE — 25000132 ZZH RX MED GY IP 250 OP 250 PS 637: Mod: GY | Performed by: HOSPITALIST

## 2019-04-29 PROCEDURE — 99285 EMERGENCY DEPT VISIT HI MDM: CPT | Mod: 25 | Performed by: EMERGENCY MEDICINE

## 2019-04-29 PROCEDURE — 84484 ASSAY OF TROPONIN QUANT: CPT | Performed by: EMERGENCY MEDICINE

## 2019-04-29 PROCEDURE — 25800030 ZZH RX IP 258 OP 636: Performed by: HOSPITALIST

## 2019-04-29 PROCEDURE — 80053 COMPREHEN METABOLIC PANEL: CPT | Performed by: EMERGENCY MEDICINE

## 2019-04-29 PROCEDURE — G0378 HOSPITAL OBSERVATION PER HR: HCPCS

## 2019-04-29 PROCEDURE — 96365 THER/PROPH/DIAG IV INF INIT: CPT | Performed by: EMERGENCY MEDICINE

## 2019-04-29 PROCEDURE — 96361 HYDRATE IV INFUSION ADD-ON: CPT

## 2019-04-29 PROCEDURE — 87088 URINE BACTERIA CULTURE: CPT | Performed by: EMERGENCY MEDICINE

## 2019-04-29 PROCEDURE — 99219 ZZC INITIAL OBSERVATION CARE,LEVL II: CPT | Performed by: HOSPITALIST

## 2019-04-29 PROCEDURE — 87186 SC STD MICRODIL/AGAR DIL: CPT | Performed by: EMERGENCY MEDICINE

## 2019-04-29 PROCEDURE — 82803 BLOOD GASES ANY COMBINATION: CPT | Performed by: EMERGENCY MEDICINE

## 2019-04-29 PROCEDURE — 83690 ASSAY OF LIPASE: CPT | Performed by: EMERGENCY MEDICINE

## 2019-04-29 PROCEDURE — A9270 NON-COVERED ITEM OR SERVICE: HCPCS | Mod: GY | Performed by: HOSPITALIST

## 2019-04-29 PROCEDURE — 81001 URINALYSIS AUTO W/SCOPE: CPT | Performed by: EMERGENCY MEDICINE

## 2019-04-29 PROCEDURE — 85025 COMPLETE CBC W/AUTO DIFF WBC: CPT | Performed by: EMERGENCY MEDICINE

## 2019-04-29 PROCEDURE — 93005 ELECTROCARDIOGRAM TRACING: CPT | Performed by: EMERGENCY MEDICINE

## 2019-04-29 PROCEDURE — 71045 X-RAY EXAM CHEST 1 VIEW: CPT | Mod: TC

## 2019-04-29 PROCEDURE — 25000128 H RX IP 250 OP 636: Performed by: EMERGENCY MEDICINE

## 2019-04-29 PROCEDURE — 93010 ELECTROCARDIOGRAM REPORT: CPT | Mod: Z6 | Performed by: EMERGENCY MEDICINE

## 2019-04-29 PROCEDURE — 87086 URINE CULTURE/COLONY COUNT: CPT | Performed by: EMERGENCY MEDICINE

## 2019-04-29 PROCEDURE — 85610 PROTHROMBIN TIME: CPT | Performed by: EMERGENCY MEDICINE

## 2019-04-29 PROCEDURE — 70450 CT HEAD/BRAIN W/O DYE: CPT

## 2019-04-29 PROCEDURE — 84145 PROCALCITONIN (PCT): CPT | Performed by: HOSPITALIST

## 2019-04-29 PROCEDURE — 83605 ASSAY OF LACTIC ACID: CPT | Performed by: EMERGENCY MEDICINE

## 2019-04-29 RX ORDER — SODIUM CHLORIDE 9 MG/ML
INJECTION, SOLUTION INTRAVENOUS CONTINUOUS
Status: DISCONTINUED | OUTPATIENT
Start: 2019-04-29 | End: 2019-04-30 | Stop reason: HOSPADM

## 2019-04-29 RX ORDER — CEFTRIAXONE 1 G/1
1 INJECTION, POWDER, FOR SOLUTION INTRAMUSCULAR; INTRAVENOUS EVERY 24 HOURS
Status: DISCONTINUED | OUTPATIENT
Start: 2019-04-30 | End: 2019-04-30 | Stop reason: HOSPADM

## 2019-04-29 RX ORDER — CEFTRIAXONE 1 G/1
1 INJECTION, POWDER, FOR SOLUTION INTRAMUSCULAR; INTRAVENOUS ONCE
Status: COMPLETED | OUTPATIENT
Start: 2019-04-29 | End: 2019-04-29

## 2019-04-29 RX ORDER — DONEPEZIL HYDROCHLORIDE 5 MG/1
10 TABLET, FILM COATED ORAL AT BEDTIME
Status: DISCONTINUED | OUTPATIENT
Start: 2019-04-29 | End: 2019-04-29

## 2019-04-29 RX ORDER — DONEPEZIL HYDROCHLORIDE 5 MG/1
10 TABLET, FILM COATED ORAL AT BEDTIME
Status: DISCONTINUED | OUTPATIENT
Start: 2019-04-29 | End: 2019-04-30 | Stop reason: HOSPADM

## 2019-04-29 RX ORDER — ACETAMINOPHEN 650 MG/1
650 SUPPOSITORY RECTAL EVERY 4 HOURS PRN
Status: DISCONTINUED | OUTPATIENT
Start: 2019-04-29 | End: 2019-04-30 | Stop reason: HOSPADM

## 2019-04-29 RX ORDER — NALOXONE HYDROCHLORIDE 0.4 MG/ML
.1-.4 INJECTION, SOLUTION INTRAMUSCULAR; INTRAVENOUS; SUBCUTANEOUS
Status: DISCONTINUED | OUTPATIENT
Start: 2019-04-29 | End: 2019-04-30 | Stop reason: HOSPADM

## 2019-04-29 RX ORDER — LEVOTHYROXINE SODIUM 100 UG/1
100 TABLET ORAL
Status: DISCONTINUED | OUTPATIENT
Start: 2019-04-30 | End: 2019-04-30 | Stop reason: HOSPADM

## 2019-04-29 RX ORDER — ONDANSETRON 4 MG/1
4 TABLET, ORALLY DISINTEGRATING ORAL EVERY 6 HOURS PRN
Status: DISCONTINUED | OUTPATIENT
Start: 2019-04-29 | End: 2019-04-30 | Stop reason: HOSPADM

## 2019-04-29 RX ORDER — ONDANSETRON 2 MG/ML
4 INJECTION INTRAMUSCULAR; INTRAVENOUS EVERY 6 HOURS PRN
Status: DISCONTINUED | OUTPATIENT
Start: 2019-04-29 | End: 2019-04-30 | Stop reason: HOSPADM

## 2019-04-29 RX ORDER — POLYETHYLENE GLYCOL 3350 17 G/17G
17 POWDER, FOR SOLUTION ORAL DAILY
Status: DISCONTINUED | OUTPATIENT
Start: 2019-04-29 | End: 2019-04-30 | Stop reason: HOSPADM

## 2019-04-29 RX ORDER — METOPROLOL TARTRATE 25 MG/1
25 TABLET, FILM COATED ORAL 2 TIMES DAILY
Status: DISCONTINUED | OUTPATIENT
Start: 2019-04-29 | End: 2019-04-29

## 2019-04-29 RX ORDER — METOPROLOL TARTRATE 25 MG/1
25 TABLET, FILM COATED ORAL 2 TIMES DAILY
Status: DISCONTINUED | OUTPATIENT
Start: 2019-04-29 | End: 2019-04-30 | Stop reason: HOSPADM

## 2019-04-29 RX ORDER — ACETAMINOPHEN 325 MG/1
650 TABLET ORAL EVERY 4 HOURS PRN
Status: DISCONTINUED | OUTPATIENT
Start: 2019-04-29 | End: 2019-04-30 | Stop reason: HOSPADM

## 2019-04-29 RX ADMIN — POLYETHYLENE GLYCOL 3350 17 G: 17 POWDER, FOR SOLUTION ORAL at 17:40

## 2019-04-29 RX ADMIN — FLUOXETINE 20 MG: 20 CAPSULE ORAL at 17:40

## 2019-04-29 RX ADMIN — SODIUM CHLORIDE: 9 INJECTION, SOLUTION INTRAVENOUS at 17:41

## 2019-04-29 RX ADMIN — CEFTRIAXONE SODIUM 1 G: 1 INJECTION, POWDER, FOR SOLUTION INTRAMUSCULAR; INTRAVENOUS at 14:42

## 2019-04-29 RX ADMIN — QUETIAPINE FUMARATE 37.5 MG: 25 TABLET ORAL at 19:44

## 2019-04-29 NOTE — ED TRIAGE NOTES
Pt comes in with family for complaints of a cough, lethargy, dehydration, and weakness. Pt was seen about 10 days ago and diagnosed with a UTI. Pt was placed on Ceftin. Pt did not tolerate ceftin and it was discontinued 1 day early.

## 2019-04-29 NOTE — ED NOTES
ED Nursing criteria listed below was addressed during verbal handoff:     Abnormal vitals: No  Abnormal results: Yes  Med Reconciliation completed: Yes  Meds given in ED: Yes  Any Overdue Meds: No  Core Measures: N/A  Isolation: N/A  Special needs: Yes  Skin assessment: No    Observation Patient  Education provided: Yes

## 2019-04-29 NOTE — ED PROVIDER NOTES
"  History     Chief Complaint   Patient presents with     Cough     The history is provided by a relative and the patient.     Anaid Dockery is a 89 year old female who presents to the emergency department for a cough. Patient is brought into the ED by her care taker and granddaughter who are both the historians. Per letter given to Dr. Velez written by patient's daughter that she lives with she was seen in ED 4/19-/19-4/20/19 for a cough. Did not find pneumonia, however, was treated with Ceftin for a UTI. Daughter states she did not tolerate the antibiotic very well (she didn't eat, drink and slept all the time) so her daughter stopped the antibiotic 1 day before she was to finish it. She then did start to drink some fluids, however, her fluid intake for the last 2 days has been about 3 cups. She urinates about twice a day. Still she is only eating a couple bites at a time. Patient reports \"hurting\" (she has chronic arthritis and is pain all the time) and then this morning complained of her right ear hurting. She still has a cough without a fever. Per family members, patient is more lethargic, weak and is possibly dehydrated. Family members report at baseline patient does have dementia and is confused.    Allergies:  Allergies   Allergen Reactions     Levofloxacin Other (See Comments)     Agitation and combativeness     Bactrim [Sulfamethoxazole W/Trimethoprim]      Ceftin [Cefuroxime] Other (See Comments)     Lethargy     Codeine Itching     endocet/percocet caused     Cortisone Acetate      20 years ago doesn't remember exact side effect. Has tolerated steroid injection into joints. 6/2008 noted     Oxycodone Itching       Problem List:    Patient Active Problem List    Diagnosis Date Noted     Unresponsive episode 12/20/2018     Priority: Medium     Syncope 12/20/2018     Priority: Medium     Chronic UTI 02/05/2018     Priority: Medium     Hypopotassemia 02/12/2017     Priority: Medium     Anemia 02/12/2017 "     Priority: Medium     Generalized muscle weakness 02/11/2017     Priority: Medium     Hip pain, right 09/07/2016     Priority: Medium     Chronic pain syndrome 09/07/2016     Priority: Medium     Hx of bladder infections 06/28/2016     Priority: Medium     Hypothyroidism, unspecified type 06/08/2016     Priority: Medium     Dementia without behavioral disturbance, unspecified dementia type 11/25/2015     Priority: Medium     Major depressive disorder, recurrent episode, mild (H) 11/25/2015     Priority: Medium     Risk for falls 11/25/2015     Priority: Medium     Persistent insomnia 08/20/2014     Priority: Medium     Chronic foot pain/bilateral 09/20/2012     Priority: Medium     `       Advance Care Planning 04/20/2012     Priority: Medium     Advance Care Planning 11/15/2016: Receipt of ACP document:  Received: POLST which was signed and dated by provider on 9-8-16.  Document previously scanned on 9-12-16.  Order reviewed and found to be valid.  Code Status needs to be updated to reflect choices in most recent ACP document. Orders are DNR only with trial of inbuation.  Confirmed/documented designated decision maker(s).  Added by Jyoti Angeles RN Advance Care Planning Liaison with [facility]  Advance Care Planning 11/15/2016: Receipt of ACP document:  Received: Health Care Directive which was witnessed or notarized on 3-24-14.  Document previously scanned on 9-20-16.  Validation form completed and sent to be scanned.  Code Status needs to be updated to reflect choices in most recent ACP document. Confirmed/documented designated decision maker(s).  Added by Jyoti Angeles RN Advance Care Planning Liaison with [facility]  Discussed advance care planning with patient; information given to patient to review.//Marcia Shay/[place]([facility]AMA)  4/20/2012          S/P hip replacement 04/20/2012     Priority: Medium     right hip       CKD (chronic kidney disease) stage 3, GFR 30-59 ml/min (H) 12/27/2011      Priority: Medium     Essential hypertension with goal blood pressure less than 140/90 06/27/2011     Priority: Medium     Balance problem 03/24/2011     Priority: Medium     Hip pain 03/24/2011     Priority: Medium     DJD (degenerative joint disease), lumbar 11/10/2009     Priority: Medium     Chronic right shoulder pain 03/04/2009     Priority: Medium     Chronic left shoulder pain 03/04/2009     Priority: Medium     Hard of hearing 10/21/2008     Priority: Medium     Flatulence, eructation, and gas pain 02/12/2008     Priority: Medium     Osteoporosis 04/05/2007     Priority: Medium     Problem list name updated by automated process. Provider to review       Gastroesophageal reflux disease with esophagitis 02/15/2005     Priority: Medium     Slow transit constipation 12/06/2003     Priority: Medium     Asymptomatic varicose veins 08/21/2001     Priority: Medium        Past Medical History:    Past Medical History:   Diagnosis Date     Enthesopathy of hip region      Lump or mass in breast 4/12/2006     Myalgia and myositis, unspecified      NONSPECIFIC MEDICAL HISTORY      NONSPECIFIC MEDICAL HISTORY      Osteoporosis, unspecified      Other kyphoscoliosis and scoliosis      Sicca syndrome (H)      Thyrotoxicosis without mention of goiter or other cause, without mention of thyrotoxic crisis or storm        Past Surgical History:    Past Surgical History:   Procedure Laterality Date     BIOPSY SALIVARY GLAND,INCISIONAL  03/21/2007    Inferior lip/     C ANESTH,REPAIR LO HERNIA VENTR/INCIS  1970's     C NONSPECIFIC PROCEDURE  04/09/2004    Lumbar epidural steroid injection via the posterior interlaminar approach at the follwoing spina level: L4-5.  The procedure was performed with fluoroscopic guidance.  ([facility]     C REPAIR OF RECTOCELE  8/20/99     C TOTAL ABDOM HYSTERECTOMY  1970's    Hysterectomy, Total Abdominal     C TOTAL HIP ARTHROPLASTY  7/04    Hip Replacement, Total, right     COLONOSCOPY   7/16/2013    Procedure: COLONOSCOPY;  colonoscopy;  Surgeon: Tiago Lowery MD;  Location: [facility]     HC ANTER COLPORRHAPHY,BLAD/VAGINA  8/20/99    Cystocele Repair     HC COLONOSCOPY THRU STOMA, DIAGNOSTIC  4/04     HC CORRECT BUNION,SIMPLE       HC DESTRUCT FACET JT NERVE, LUMB/SACR SINGLE LEVEL  2009     HC DRAIN/INJ MAJOR JOINT/BURSA W/O US  2009    Left shoulder     HC INJ EPIDURAL LUMBAR/SACRAL W/WO CONTRAST  2008    lumbar epidural steroid injection via posterior interlaminar approach at following spinal level-L4-5     HC PLACE NEEDLE WIRE PREOP, BREAST, INITIAL  04/07/06    Wire locallization of right  breast     HC REMOVAL OF LEG VEINS/ULCER      vein stripping     HC REMV CATARACT EXTRACAP,INSERT LENS  7/17/2003    right     HC UGI ENDOSCOPY DIAG W OR W/O BRUSH/WASH  03/08/2006       Family History:    Family History   Problem Relation Age of Onset     Cancer Sister         Unknown       Social History:  Marital Status:   [5]  Social History     Tobacco Use     Smoking status: Never Smoker     Smokeless tobacco: Never Used     Tobacco comment: no smokers in the household   Substance Use Topics     Alcohol use: No     Drug use: No        Medications:      Acetaminophen (ACTAMIN MAXIMUM STRENGTH OR)   diclofenac (VOLTAREN) 50 MG EC tablet   donepezil (ARICEPT) 10 MG tablet   FLUoxetine (PROZAC) 20 MG capsule   HYDROcodone-acetaminophen (NORCO) 5-325 MG tablet   levothyroxine (SYNTHROID/LEVOTHROID) 100 MCG tablet   metoprolol tartrate (LOPRESSOR) 25 MG tablet   MULTI-VITAMIN TABS   OR   polyethylene glycol (MIRALAX/GLYCOLAX) packet   QUEtiapine (SEROQUEL) 25 MG tablet   ranitidine (ZANTAC) 300 MG tablet   docusate sodium (COLACE) 100 MG capsule   order for DME         Review of Systems   All other systems reviewed and are negative.      Physical Exam   BP: 148/86  Pulse: 96  Heart Rate: 89  Temp: 98  F (36.7  C)  Resp: 16  Weight: 60.8 kg (134 lb)  SpO2: 95 %      Physical Exam   Nursing note and  vitals reviewed.  Went into the room the patient is sleeping but is arousable to loud voice.  She is wearing hearing aids bilaterally.  She looks pale.  There is no scleral icterus.  She had complaints of right ear pain but I do not appreciate any acute infection or irritation of the canal the TM appears normal.  The left TM is only partially visualized but looks normal.  No nasal drainage.  Her lips and tongue are dry.  No oral lesions otherwise.  Neck is supple.  Lungs reveal coarse breathing with rhonchi at the left base.  Cardiac auscultation is regular without murmur.    ED Course        Procedures               EKG Interpretation:      Interpreted by Tai Velez  Time reviewed: 11:30  Symptoms at time of EKG: Generalized weakness  Rhythm: atrial fibrillation - controlled  Rate: 100-110  Axis: Normal  Ectopy: none  Conduction: normal  ST Segments/ T Waves: Non-specific ST-T wave changes  Q Waves: none  Comparison to prior: Unchanged from 12/20/18    Clinical Impression: atrial fibrillation (chronic)                Critical Care time:  none               Results for orders placed or performed during the hospital encounter of 04/29/19 (from the past 24 hour(s))   Lactic acid whole blood   Result Value Ref Range    Lactic Acid 1.1 0.7 - 2.0 mmol/L   Blood gas venous   Result Value Ref Range    Ph Venous 7.37 7.32 - 7.43 pH    PCO2 Venous 41 40 - 50 mm Hg    PO2 Venous 27 25 - 47 mm Hg    Bicarbonate Venous 24 21 - 28 mmol/L    Base Deficit Venous 1.4 mmol/L    FIO2 21    CBC with platelets differential   Result Value Ref Range    WBC 11.2 (H) 4.0 - 11.0 10e9/L    RBC Count 3.50 (L) 3.8 - 5.2 10e12/L    Hemoglobin 8.7 (L) 11.7 - 15.7 g/dL    Hematocrit 29.0 (L) 35.0 - 47.0 %    MCV 83 78 - 100 fl    MCH 24.9 (L) 26.5 - 33.0 pg    MCHC 30.0 (L) 31.5 - 36.5 g/dL    RDW 16.6 (H) 10.0 - 15.0 %    Platelet Count 435 150 - 450 10e9/L    Diff Method Automated Method     % Neutrophils 64.4 %    % Lymphocytes 25.7 %    %  Monocytes 6.4 %    % Eosinophils 2.2 %    % Basophils 0.2 %    % Immature Granulocytes 1.1 %    Nucleated RBCs 0 0 /100    Absolute Neutrophil 7.2 1.6 - 8.3 10e9/L    Absolute Lymphocytes 2.9 0.8 - 5.3 10e9/L    Absolute Monocytes 0.7 0.0 - 1.3 10e9/L    Absolute Basophils 0.0 0.0 - 0.2 10e9/L    Abs Immature Granulocytes 0.1 0 - 0.4 10e9/L    Absolute Nucleated RBC 0.0    INR   Result Value Ref Range    INR 1.08 0.86 - 1.14   Comprehensive metabolic panel   Result Value Ref Range    Sodium 140 133 - 144 mmol/L    Potassium 4.1 3.4 - 5.3 mmol/L    Chloride 107 94 - 109 mmol/L    Carbon Dioxide 23 20 - 32 mmol/L    Anion Gap 10 3 - 14 mmol/L    Glucose 84 70 - 99 mg/dL    Urea Nitrogen 17 7 - 30 mg/dL    Creatinine 0.91 0.52 - 1.04 mg/dL    GFR Estimate 56 (L) >60 mL/min/[1.73_m2]    GFR Estimate If Black 64 >60 mL/min/[1.73_m2]    Calcium 8.8 8.5 - 10.1 mg/dL    Bilirubin Total 0.3 0.2 - 1.3 mg/dL    Albumin 2.8 (L) 3.4 - 5.0 g/dL    Protein Total 7.4 6.8 - 8.8 g/dL    Alkaline Phosphatase 91 40 - 150 U/L    ALT 12 0 - 50 U/L    AST 14 0 - 45 U/L   Lipase   Result Value Ref Range    Lipase 96 73 - 393 U/L   Troponin I   Result Value Ref Range    Troponin I ES <0.015 0.000 - 0.045 ug/L   Routine UA with microscopic   Result Value Ref Range    Color Urine Yellow     Appearance Urine Slightly Cloudy     Glucose Urine Negative NEG^Negative mg/dL    Bilirubin Urine Negative NEG^Negative    Ketones Urine 5 (A) NEG^Negative mg/dL    Specific Gravity Urine 1.023 1.003 - 1.035    Blood Urine Negative NEG^Negative    pH Urine 5.0 5.0 - 7.0 pH    Protein Albumin Urine Negative NEG^Negative mg/dL    Urobilinogen mg/dL 0.0 0.0 - 2.0 mg/dL    Nitrite Urine Negative NEG^Negative    Leukocyte Esterase Urine Small (A) NEG^Negative    Source Unspecified Urine     WBC Urine 7 (H) 0 - 5 /HPF    RBC Urine 11 (H) 0 - 2 /HPF    Bacteria Urine Few (A) NEG^Negative /HPF    Yeast Urine Many (A) NEG^Negative /HPF    Squamous Epithelial /HPF  Urine 3 (H) 0 - 1 /HPF    Mucous Urine Present (A) NEG^Negative /LPF   XR Chest Port 1 View    Narrative    CHEST PORTABLE ONE VIEW   4/29/2019 12:02 PM     HISTORY: Coarse cough and generalized weakness.    COMPARISON: 4/19/2019.      Impression    IMPRESSION: Very mild interstitial prominence bilaterally is  increased. This perhaps represents some minimal interstitial edema. No  new airspace disease. A focal nodule at the left suprahilar region is  unchanged. Consider nonurgent CT for further characterization.   CT Head w/o Contrast    Narrative    CT OF THE HEAD WITHOUT CONTRAST April 29, 2019 12:54 PM     HISTORY: Altered level of consciousness (LOC), unexplained.    TECHNIQUE: 5 mm thick axial CT images of the head were acquired  without IV contrast material. Radiation dose for this scan was reduced  using automated exposure control, adjustment of the mA and/or kV  according to patient size, or iterative reconstruction technique.    COMPARISON: Head CT 12/20/2018.    FINDINGS: Postoperative changes consisting of a left frontopterional  craniotomy and left temporal lobe surgical resection cavity again  noted. There is mild diffuse cerebral volume loss. There are subtle  patchy areas of decreased density in the cerebral white matter  bilaterally that are consistent with sequela of chronic small vessel  ischemic disease.     The ventricles and basal cisterns are within normal limits in  configuration given the degree of cerebral volume loss. There is no  midline shift. There are no extra-axial fluid collections.     No intracranial hemorrhage, mass or recent infarct.    The visualized paranasal sinuses are well-aerated. There is no  mastoiditis. There are no fractures of the visualized bones.       Impression    IMPRESSION: Postoperative changes to the left temporal skull and left  temporal lobe again noted. Diffuse cerebral volume loss and cerebral  white matter changes consistent with chronic small vessel  "ischemic  disease. No evidence for acute intracranial pathology.                *Note: Due to a large number of results and/or encounters for the requested time period, some results have not been displayed. A complete set of results can be found in Results Review.       Medications   cefTRIAXone (ROCEPHIN) 1 g vial to attach to  mL bag for ADULTS or NS 50 mL bag for PEDS (has no administration in time range)     IV fluids were provided.  Blood work is ordered.  EKG is reviewed as above.  Chest x-ray ordered.  At 12:20 PM on recheck the patient is now sitting upright at 90 degrees.  She is more alert and interactive.  She still confused from her dementia.  If she was just dehydrated.  She now request something to eat.  She did drink some water.  A head CT is ordered to further assess for acute intracranial process.  Assessments & Plan (with Medical Decision Making)   Anaid Dockery is a 89 year old female who presents to the emergency department for a cough. Patient is brought into the ED by her care taker and granddaughter who are both the historians. Per letter given to Dr. Velez written by patient's daughter that she lives with she was seen in ED 4/19-/19-4/20/19 for a cough. Did not find pneumonia, however, was treated with Ceftin for a UTI. Daughter states she did not tolerate the antibiotic very well (she didn't eat, drink and slept all the time) so her daughter stopped the antibiotic 1 day before she was to finish it. She then did start to drink some fluids, however, her fluid intake for the last 2 days has been about 3 cups. She urinates about twice a day. Still she is only eating a couple bites at a time. Patient reports \"hurting\" (she has chronic arthritis and is pain all the time) and then this morning complained of her right ear hurting. She still has a cough without a fever. Per family members, patient is more lethargic, weak and is possibly dehydrated. Family members report at baseline patient " does have dementia and is confused.  On presentation patient was afebrile and vitally stable.  Not hypoxic.  She was lethargic but would want to loud voice and answer questions.  Exam she did look overly dry.  Neck was supple.  She had coarse breath sounds but x-ray did not show acute infiltrate.  Her urine was weakly positive as noted above.  A culture is pending.  Blood work is unremarkable as noted above.  EKG looks like she is in A. fib but controlled rate.  Troponin was normal.  Chest x-ray showed no acute infiltrate.  We did do a head CT looking for acute intracranial bleed and this showed no acute findings but did show diffuse cerebral volume loss and previous postoperative changes noted above.  After some IV fluid she did perk up somewhat.  She also ate a little bit of food and drank some water.  At this point we will admit the patient for observation for additional fluid resuscitation and will cover her for possible UTI.   was apprised and will follow on admission.  I have reviewed the nursing notes.    I have reviewed the findings, diagnosis, plan and need for follow up with the patient.          Medication List      There are no discharge medications for this visit.         Final diagnoses:   Lethargy   Urinary tract infection without hematuria, site unspecified     This document serves as a record of services personally performed by Tai Velez MD. It was created on their behalf by Nneka Campbell, a trained medical scribe. The creation of this record is based on the provider's personal observations and the statements of the patient. This document has been checked and approved by the attending provider.    Note: Chart documentation done in part with Dragon Voice Recognition software. Although reviewed after completion, some word and grammatical errors may remain.    4/29/2019   [facility] EMERGENCY DEPARTMENT     Tai Velez MD  04/29/19 [id number]

## 2019-04-29 NOTE — LETTER
Transition Communication Hand-off for Care Transitions to Next Level of Care Provider    Name: Anaid Dockery  : 1930  MRN #: [id number]  Primary Care Provider: Tai Kim  Primary Care MD Name: Dr. Tai Kim  Primary Clinic: [address] [place] [address] [address] [id number]  Primary Care Clinic Name: [facility]  Reason for Hospitalization:  Lethargy [R53.83]  Urinary tract infection without hematuria, site unspecified [N39.0]  Admit Date/Time: 2019 10:37 AM  Discharge Date: 19  Payor Source: Payor: MEDICARE / Plan: MEDICARE / Product Type: Medicare /     Readmission Assessment Measure (SARI) Risk Score/category: N/A - OBS         Reason for Communication Hand-off Referral: Fragility    Discharge Plan: Return to her [facility]     Concern for non-adherence with plan of care:   Y/N : no    Discharge Needs Assessment:  Needs      Most Recent Value   Equipment Currently Used at Home  hospital bed, commode, wheelchair, manual, walker, rolling, shower chair, grab bar, toilet   # of Referrals Placed by [facility]  Internal Clinic Care Coordination, Homecare   Home Care  [facility] [phone], Fax: [phone]        Follow-up plan:  No future appointments.      Key Recommendations:  Patient will return to her [facility].  She will receive [facility] for RN, HHA, P/T, O/T and S/T.    JOHNY Mejia  [facility] [phone]/ [facility] [phone]      AVS/Discharge Summary is the source of truth; this is a helpful guide for improved communication of patient story

## 2019-04-29 NOTE — ED NOTES
Pt up to BR with Renetta Steady. Urine sample obtained. No other issues. Pt still seems lethargic and confused

## 2019-04-29 NOTE — PROGRESS NOTES
S-(situation): Patient registered to Observation. Patient arrived to room 265 via cart from ED @ 1540.    B-(background): UTI, h/o dementia    A-(assessment): h/o dementia, confused, pt lethargic, but arousable by voice. Minimal verbal response. /86   Pulse 96   Temp 97.2  F (36.2  C) (Axillary)   Resp 18   Wt 61.3 kg (135 lb 2.3 oz)   SpO2 95%   BMI 21.81 kg/m      R-(recommendations): Orders and observation goals reviewed with pt and caregiver.    Nursing Observation criteria listed below was met:    Skin issues/needs documented:NA  Isolation needs addressed, if appropriate: NA  Fall Prevention: Education given and documented: Yes  Education Assessment documented:Yes  Education Documented (Pre-existing chronic infection such as, MRSA/VRE need education on admission): No  OBS video/handout Reviewed & DocumentedYes  Medication Reconciliation Complete: Yes  New medication patient education completed and documented (Possible Side Effects of Common Medications handout): Yes  Home medications if not able to send immediately home with family stored here: NA  Reminder note placed in discharge instructions: NA  Patient has discharge needs (If yes, please explain): No

## 2019-04-29 NOTE — ED NOTES
Patient up to bathroom with use of yamilex steady and assist of one RN.  Patient had small bowel movement and tolerated yamilex steady fairly well.  Primary RN notified.

## 2019-04-29 NOTE — ED NOTES
Pts granddaughter asked writer if she could give the pt her afternoon medications. MD notified. Granddaughter updated.

## 2019-04-29 NOTE — H&P
[facility]    History and Physical  Hospitalist       Date of Admission:  4/29/2019    Assessment & Plan   Principal Problem:    Unresponsive episode    Assessment:was brought due to lethargy, confusion and also cough. Head CT is normal. Hx of dementia. Urine show possible UTI, seem to improve with hydration    Plan: will admit under observation. Will do PT and OT. Will give hydration. Monitor overnight    Active Problems:    Dementia without behavioral disturbance, unspecified dementia type    Assessment: stable, on donepezil.     Plan: continue the same      Major depressive disorder, recurrent episode, mild (H)    Assessment: on fluoxetine 20mg daily    Plan: continue the same      Essential hypertension with goal blood pressure less than 140/90    Assessment: on metoprolol    Plan: continue the same      Gastroesophageal reflux disease with esophagitis    Assessment: on zantac 300mg daily    Plan: continue the same      UTI (urinary tract infection)    Assessment: urine show UTI. Was given ceftriaxone in the ER    Plan: continue ceftriaxone       Anaid Dockery is a 89 year old female who presents with confusion and lethargy. Has been having decrease appetite for the last 2 days. Pt also has cough cxr is normal. Was started on ceftin as outpatient but does not seem to improve her condition. She seem to be more and more lethargic. She was brought here and was given fluid hydration and seem to improve her condition. When I see her in her room she was awake and alert. She does not answer question appropriately, seem to be somewhat baseline though due to her dementia       # Pain Assessment:  Current Pain Score 12/21/2018   Patient currently in pain? denies   Pain score (0-10) -   Pain location -   Pain descriptors -   Anaid hester pain level was assessed and she currently denies pain.      DVT Prophylaxis: Pneumatic Compression Devices  Code Status: DNR, but in the event need  intubation POLLST mention that she want to have a trial of intubation up to 3 days    Disposition: Expected discharge in 1-2 days once condition improved.    Luis Felipe Du    Primary Care Physician   Tai Nick Kim    Chief Complaint   Lethargy     History is obtained from the grand daughter    History of Present Illness    Anaid Dockery is a 89 year old female who presents with confusion and lethargy. Has been having decrease appetite for the last 2 days. Pt also has cough cxr is normal. Was started on ceftin as outpatient but does not seem to improve her condition. She seem to be more and more lethargic. She was brought here and was given fluid hydration and seem to improve her condition. When I see her in her room she was awake and alert. She does not answer question appropriately, seem to be somewhat baseline though due to her dementia     Past Medical History    I have reviewed this patient's medical history and updated it with pertinent information if needed.   Past Medical History:   Diagnosis Date     Enthesopathy of hip region     Bilateral trochanteric bursitis     Lump or mass in breast 4/12/2006     Myalgia and myositis, unspecified     fibromyalgia; Myalgias/arthralgias.     NONSPECIFIC MEDICAL HISTORY     Iliotibial band syndrome.     NONSPECIFIC MEDICAL HISTORY     Chronic pain syndrome     Osteoporosis, unspecified      Other kyphoscoliosis and scoliosis      Sicca syndrome (H)      Thyrotoxicosis without mention of goiter or other cause, without mention of thyrotoxic crisis or storm     hyperthyroidism       Past Surgical History   I have reviewed this patient's surgical history and updated it with pertinent information if needed.  Past Surgical History:   Procedure Laterality Date     BIOPSY SALIVARY GLAND,INCISIONAL  03/21/2007    Inferior lip/     C ANESTH,REPAIR LO HERNIA VENTR/INCIS  1970's     C NONSPECIFIC PROCEDURE  04/09/2004    Lumbar epidural steroid injection via the  posterior interlaminar approach at the follwoing spina level: L4-5.  The procedure was performed with fluoroscopic guidance.  ([facility]     C REPAIR OF RECTOCELE  99     C TOTAL ABDOM HYSTERECTOMY  's    Hysterectomy, Total Abdominal     C TOTAL HIP ARTHROPLASTY      Hip Replacement, Total, right     COLONOSCOPY  2013    Procedure: COLONOSCOPY;  colonoscopy;  Surgeon: Tiago Lowery MD;  Location: [facility]     HC ANTER COLPORRHAPHY,BLAD/VAGINA  99    Cystocele Repair     HC COLONOSCOPY THRU STOMA, DIAGNOSTIC       HC CORRECT BUNION,SIMPLE       HC DESTRUCT FACET JT NERVE, LUMB/SACR SINGLE LEVEL       HC DRAIN/INJ MAJOR JOINT/BURSA W/O US      Left shoulder     HC INJ EPIDURAL LUMBAR/SACRAL W/WO CONTRAST      lumbar epidural steroid injection via posterior interlaminar approach at following spinal level-L4-5     HC PLACE NEEDLE WIRE PREOP, BREAST, INITIAL  06    Wire locallization of right  breast     HC REMOVAL OF LEG VEINS/ULCER      vein stripping     HC REMV CATARACT EXTRACAP,INSERT LENS  2003    right     HC UGI ENDOSCOPY DIAG W OR W/O BRUSH/WASH  2006       Prior to Admission Medications   Prior to Admission Medications   Prescriptions Last Dose Informant Patient Reported? Taking?   Acetaminophen (ACTAMIN MAXIMUM STRENGTH OR) 2019 at 1200  Yes Yes   Sig: Take 1,000 mg by mouth once Every 4-6 hours for pain   FLUoxetine (PROZAC) 20 MG capsule Past Week at Unknown time  No Yes   Sig: TAKE ONE CAPSULE BY MOUTH EVERY DAY   HYDROcodone-acetaminophen (NORCO) 5-325 MG tablet 2019 at 0800  No Yes   Sig: TAKE 1 TABLET BY ORAL ROUTE EVERY 6 HOURS AS NEEDED FOR PAIN   MULTI-VITAMIN TABS   OR Past Month at Unknown time  No Yes   Si tablet daily   QUEtiapine (SEROQUEL) 25 MG tablet 2019 at 2200  No Yes   Sig: TAKE ONE TABLET BY MOUTH AT BEDTIME AND TAKE 1 AND 1/2 TABLETS DAILY AS NEEDED FOR RESTLESSNESS/AGITATION   diclofenac  (VOLTAREN) 50 MG EC tablet 4/29/2019 at 0800  No Yes   Sig: TAKE ONE TABLET BY MOUTH TWICE A DAY   docusate sodium (COLACE) 100 MG capsule More than a month at Unknown time  Yes No   Sig: Take 100 mg by mouth At Bedtime    donepezil (ARICEPT) 10 MG tablet 4/29/2019 at 0800  No Yes   Sig: TAKE ONE TABLET BY MOUTH EVERY NIGHT AT BEDTIME   levothyroxine (SYNTHROID/LEVOTHROID) 100 MCG tablet 4/29/2019 at 0800  No Yes   Sig: TAKE ONE TABLET BY MOUTH EVERY DAY   metoprolol tartrate (LOPRESSOR) 25 MG tablet 4/28/2019 at 2200  No Yes   Sig: TAKE ONE TABLET BY MOUTH EVERY MORNING AND TWO TABLETS EVERY EVENING   Patient taking differently: TAKE ONE TABLET BY MOUTH EVERY MORNING AND ONE TABLETS EVERY EVENING   order for DME   No No   Sig: Equipment being ordered: Hospital Bed   polyethylene glycol (MIRALAX/GLYCOLAX) packet Past Week  Yes Yes   Sig: Take 1 packet by mouth daily   ranitidine (ZANTAC) 300 MG tablet 4/29/2019 at 0800  No Yes   Sig: TAKE ONE TABLET BY MOUTH EVERY DAY      Facility-Administered Medications: None     Allergies   Allergies   Allergen Reactions     Levofloxacin Other (See Comments)     Agitation and combativeness     Bactrim [Sulfamethoxazole W/Trimethoprim]      Ceftin [Cefuroxime] Other (See Comments)     Lethargy     Codeine Itching     endocet/percocet caused     Cortisone Acetate      20 years ago doesn't remember exact side effect. Has tolerated steroid injection into joints. 6/2008 noted     Oxycodone Itching       Social History   I have reviewed this patient's social history and updated it with pertinent information if needed. Anaid Dockery  reports that she has never smoked. She has never used smokeless tobacco. She reports that she does not drink alcohol or use drugs.    Family History   I have reviewed this patient's family history and updated it with pertinent information if needed.   Family History   Problem Relation Age of Onset     Cancer Sister         Unknown       Review of  Systems   Unable to obtained due to confusion     Physical Exam   Temp: 98  F (36.7  C) Temp src: Oral BP: 152/87 Pulse: 85 Heart Rate: 89 Resp: 16 SpO2: 97 % O2 Device: None (Room air)    Vital Signs with Ranges  Temp:  [98  F (36.7  C)] 98  F (36.7  C)  Pulse:  [80-96] 85  Heart Rate:  [89] 89  Resp:  [16] 16  BP: (130-175)/() 152/87  SpO2:  [93 %-97 %] 97 %  134 lbs 0 oz    Constitutional: alert, confused, in minimal distress  Eyes: PERRLA  HEENT: normocephalic atraumatic, ENT is normal  Respiratory: clear to auscultation bilaterally  Cardiovascular: regular rate and rhythm  GI: supple, non tender, non distended, positive bowel sound  Lymph/Hematologic: no lymph node enlargement  Genitourinary: not examined  Skin: no rash or bruise  Musculoskeletal: no leg edema,  Neurologic: confused, unable to follow command, unable to answer question appropriately  Psychiatric: confused     Data     Data reviewed today:    Recent Results (from the past 168 hour(s))   Lactic acid whole blood    Collection Time: 04/29/19 11:40 AM   Result Value Ref Range    Lactic Acid 1.1 0.7 - 2.0 mmol/L   Blood gas venous    Collection Time: 04/29/19 11:40 AM   Result Value Ref Range    Ph Venous 7.37 7.32 - 7.43 pH    PCO2 Venous 41 40 - 50 mm Hg    PO2 Venous 27 25 - 47 mm Hg    Bicarbonate Venous 24 21 - 28 mmol/L    Base Deficit Venous 1.4 mmol/L    FIO2 21    CBC with platelets differential    Collection Time: 04/29/19 11:41 AM   Result Value Ref Range    WBC 11.2 (H) 4.0 - 11.0 10e9/L    RBC Count 3.50 (L) 3.8 - 5.2 10e12/L    Hemoglobin 8.7 (L) 11.7 - 15.7 g/dL    Hematocrit 29.0 (L) 35.0 - 47.0 %    MCV 83 78 - 100 fl    MCH 24.9 (L) 26.5 - 33.0 pg    MCHC 30.0 (L) 31.5 - 36.5 g/dL    RDW 16.6 (H) 10.0 - 15.0 %    Platelet Count 435 150 - 450 10e9/L    Diff Method Automated Method     % Neutrophils 64.4 %    % Lymphocytes 25.7 %    % Monocytes 6.4 %    % Eosinophils 2.2 %    % Basophils 0.2 %    % Immature Granulocytes 1.1 %     Nucleated RBCs 0 0 /100    Absolute Neutrophil 7.2 1.6 - 8.3 10e9/L    Absolute Lymphocytes 2.9 0.8 - 5.3 10e9/L    Absolute Monocytes 0.7 0.0 - 1.3 10e9/L    Absolute Basophils 0.0 0.0 - 0.2 10e9/L    Abs Immature Granulocytes 0.1 0 - 0.4 10e9/L    Absolute Nucleated RBC 0.0    INR    Collection Time: 04/29/19 11:41 AM   Result Value Ref Range    INR 1.08 0.86 - 1.14   Comprehensive metabolic panel    Collection Time: 04/29/19 11:41 AM   Result Value Ref Range    Sodium 140 133 - 144 mmol/L    Potassium 4.1 3.4 - 5.3 mmol/L    Chloride 107 94 - 109 mmol/L    Carbon Dioxide 23 20 - 32 mmol/L    Anion Gap 10 3 - 14 mmol/L    Glucose 84 70 - 99 mg/dL    Urea Nitrogen 17 7 - 30 mg/dL    Creatinine 0.91 0.52 - 1.04 mg/dL    GFR Estimate 56 (L) >60 mL/min/[1.73_m2]    GFR Estimate If Black 64 >60 mL/min/[1.73_m2]    Calcium 8.8 8.5 - 10.1 mg/dL    Bilirubin Total 0.3 0.2 - 1.3 mg/dL    Albumin 2.8 (L) 3.4 - 5.0 g/dL    Protein Total 7.4 6.8 - 8.8 g/dL    Alkaline Phosphatase 91 40 - 150 U/L    ALT 12 0 - 50 U/L    AST 14 0 - 45 U/L   Lipase    Collection Time: 04/29/19 11:41 AM   Result Value Ref Range    Lipase 96 73 - 393 U/L   Troponin I    Collection Time: 04/29/19 11:41 AM   Result Value Ref Range    Troponin I ES <0.015 0.000 - 0.045 ug/L   Routine UA with microscopic    Collection Time: 04/29/19 11:54 AM   Result Value Ref Range    Color Urine Yellow     Appearance Urine Slightly Cloudy     Glucose Urine Negative NEG^Negative mg/dL    Bilirubin Urine Negative NEG^Negative    Ketones Urine 5 (A) NEG^Negative mg/dL    Specific Gravity Urine 1.023 1.003 - 1.035    Blood Urine Negative NEG^Negative    pH Urine 5.0 5.0 - 7.0 pH    Protein Albumin Urine Negative NEG^Negative mg/dL    Urobilinogen mg/dL 0.0 0.0 - 2.0 mg/dL    Nitrite Urine Negative NEG^Negative    Leukocyte Esterase Urine Small (A) NEG^Negative    Source Unspecified Urine     WBC Urine 7 (H) 0 - 5 /HPF    RBC Urine 11 (H) 0 - 2 /HPF    Bacteria Urine Few  (A) NEG^Negative /HPF    Yeast Urine Many (A) NEG^Negative /HPF    Squamous Epithelial /HPF Urine 3 (H) 0 - 1 /HPF    Mucous Urine Present (A) NEG^Negative /LPF      Recent Results (from the past 24 hour(s))   XR Chest Port 1 View    Narrative    CHEST PORTABLE ONE VIEW   4/29/2019 12:02 PM     HISTORY: Coarse cough and generalized weakness.    COMPARISON: 4/19/2019.      Impression    IMPRESSION: Very mild interstitial prominence bilaterally is  increased. This perhaps represents some minimal interstitial edema. No  new airspace disease. A focal nodule at the left suprahilar region is  unchanged. Consider nonurgent CT for further characterization.   CT Head w/o Contrast    Narrative    CT OF THE HEAD WITHOUT CONTRAST April 29, 2019 12:54 PM     HISTORY: Altered level of consciousness (LOC), unexplained.    TECHNIQUE: 5 mm thick axial CT images of the head were acquired  without IV contrast material. Radiation dose for this scan was reduced  using automated exposure control, adjustment of the mA and/or kV  according to patient size, or iterative reconstruction technique.    COMPARISON: Head CT 12/20/2018.    FINDINGS: Postoperative changes consisting of a left frontopterional  craniotomy and left temporal lobe surgical resection cavity again  noted. There is mild diffuse cerebral volume loss. There are subtle  patchy areas of decreased density in the cerebral white matter  bilaterally that are consistent with sequela of chronic small vessel  ischemic disease.     The ventricles and basal cisterns are within normal limits in  configuration given the degree of cerebral volume loss. There is no  midline shift. There are no extra-axial fluid collections.     No intracranial hemorrhage, mass or recent infarct.    The visualized paranasal sinuses are well-aerated. There is no  mastoiditis. There are no fractures of the visualized bones.       Impression    IMPRESSION: Postoperative changes to the left temporal skull and  left  temporal lobe again noted. Diffuse cerebral volume loss and cerebral  white matter changes consistent with chronic small vessel ischemic  disease. No evidence for acute intracranial pathology.

## 2019-04-30 ENCOUNTER — APPOINTMENT (OUTPATIENT)
Dept: SPEECH THERAPY | Facility: CLINIC | Age: 84
End: 2019-04-30
Attending: HOSPITALIST
Payer: MEDICARE

## 2019-04-30 ENCOUNTER — APPOINTMENT (OUTPATIENT)
Dept: PHYSICAL THERAPY | Facility: CLINIC | Age: 84
End: 2019-04-30
Attending: HOSPITALIST
Payer: MEDICARE

## 2019-04-30 VITALS
SYSTOLIC BLOOD PRESSURE: 165 MMHG | RESPIRATION RATE: 18 BRPM | OXYGEN SATURATION: 97 % | BODY MASS INDEX: 21.81 KG/M2 | HEART RATE: 78 BPM | WEIGHT: 135.14 LBS | DIASTOLIC BLOOD PRESSURE: 92 MMHG | TEMPERATURE: 97.7 F

## 2019-04-30 PROBLEM — R82.81 PYURIA: Status: ACTIVE | Noted: 2018-02-05

## 2019-04-30 PROBLEM — R13.12 OROPHARYNGEAL DYSPHAGIA: Status: ACTIVE | Noted: 2019-04-30

## 2019-04-30 LAB
ANION GAP SERPL CALCULATED.3IONS-SCNC: 9 MMOL/L (ref 3–14)
BASOPHILS # BLD AUTO: 0 10E9/L (ref 0–0.2)
BASOPHILS NFR BLD AUTO: 0.1 %
BUN SERPL-MCNC: 13 MG/DL (ref 7–30)
CALCIUM SERPL-MCNC: 8.7 MG/DL (ref 8.5–10.1)
CHLORIDE SERPL-SCNC: 112 MMOL/L (ref 94–109)
CO2 SERPL-SCNC: 21 MMOL/L (ref 20–32)
CREAT SERPL-MCNC: 0.83 MG/DL (ref 0.52–1.04)
DIFFERENTIAL METHOD BLD: ABNORMAL
EOSINOPHIL # BLD AUTO: 0.1 10E9/L (ref 0–0.7)
EOSINOPHIL NFR BLD AUTO: 0.9 %
ERYTHROCYTE [DISTWIDTH] IN BLOOD BY AUTOMATED COUNT: 17 % (ref 10–15)
GFR SERPL CREATININE-BSD FRML MDRD: 63 ML/MIN/{1.73_M2}
GLUCOSE SERPL-MCNC: 99 MG/DL (ref 70–99)
HCT VFR BLD AUTO: 31.6 % (ref 35–47)
HGB BLD-MCNC: 9.9 G/DL (ref 11.7–15.7)
LACTATE BLD-SCNC: 1.3 MMOL/L (ref 0.7–2)
LYMPHOCYTES # BLD AUTO: 2.1 10E9/L (ref 0.8–5.3)
LYMPHOCYTES NFR BLD AUTO: 15.5 %
MCH RBC QN AUTO: 25.3 PG (ref 26.5–33)
MCHC RBC AUTO-ENTMCNC: 31.3 G/DL (ref 31.5–36.5)
MCV RBC AUTO: 81 FL (ref 78–100)
MONOCYTES # BLD AUTO: 0.9 10E9/L (ref 0–1.3)
MONOCYTES NFR BLD AUTO: 7 %
NEUTROPHILS # BLD AUTO: 10.3 10E9/L (ref 1.6–8.3)
NEUTROPHILS NFR BLD AUTO: 76.5 %
PLATELET # BLD AUTO: 462 10E9/L (ref 150–450)
POTASSIUM SERPL-SCNC: 3.9 MMOL/L (ref 3.4–5.3)
RBC # BLD AUTO: 3.92 10E12/L (ref 3.8–5.2)
SODIUM SERPL-SCNC: 142 MMOL/L (ref 133–144)
WBC # BLD AUTO: 13.4 10E9/L (ref 4–11)

## 2019-04-30 PROCEDURE — 92610 EVALUATE SWALLOWING FUNCTION: CPT | Mod: GN | Performed by: SPEECH-LANGUAGE PATHOLOGIST

## 2019-04-30 PROCEDURE — 85025 COMPLETE CBC W/AUTO DIFF WBC: CPT | Performed by: HOSPITALIST

## 2019-04-30 PROCEDURE — 96361 HYDRATE IV INFUSION ADD-ON: CPT

## 2019-04-30 PROCEDURE — G0378 HOSPITAL OBSERVATION PER HR: HCPCS

## 2019-04-30 PROCEDURE — 83605 ASSAY OF LACTIC ACID: CPT | Performed by: HOSPITALIST

## 2019-04-30 PROCEDURE — 36415 COLL VENOUS BLD VENIPUNCTURE: CPT | Performed by: HOSPITALIST

## 2019-04-30 PROCEDURE — A9270 NON-COVERED ITEM OR SERVICE: HCPCS | Performed by: HOSPITALIST

## 2019-04-30 PROCEDURE — 80048 BASIC METABOLIC PNL TOTAL CA: CPT | Performed by: HOSPITALIST

## 2019-04-30 PROCEDURE — 25000132 ZZH RX MED GY IP 250 OP 250 PS 637: Performed by: HOSPITALIST

## 2019-04-30 PROCEDURE — 99217 ZZC OBSERVATION CARE DISCHARGE: CPT | Performed by: PEDIATRICS

## 2019-04-30 PROCEDURE — 97161 PT EVAL LOW COMPLEX 20 MIN: CPT | Mod: GP | Performed by: PHYSICAL THERAPIST

## 2019-04-30 PROCEDURE — 40000894 ZZH STATISTIC OT IP EVAL DEFER

## 2019-04-30 RX ADMIN — RANITIDINE 300 MG: 150 TABLET ORAL at 10:17

## 2019-04-30 RX ADMIN — LEVOTHYROXINE SODIUM 100 MCG: 100 TABLET ORAL at 07:50

## 2019-04-30 RX ADMIN — METOPROLOL TARTRATE 25 MG: 25 TABLET ORAL at 10:17

## 2019-04-30 RX ADMIN — ACETAMINOPHEN 650 MG: 325 TABLET ORAL at 07:50

## 2019-04-30 RX ADMIN — POLYETHYLENE GLYCOL 3350 17 G: 17 POWDER, FOR SOLUTION ORAL at 10:17

## 2019-04-30 RX ADMIN — FLUOXETINE 20 MG: 20 CAPSULE ORAL at 10:17

## 2019-04-30 NOTE — PROGRESS NOTES
04/30/19 1100   General Information   Onset Date 04/29/19   Start of Care Date 04/30/19   Referring Physician Dr. Du   Patient Profile Review/OT: Additional Occupational Profile Info See Profile for full history and prior level of function   Patient/Family Goals Statement Uable to state   Swallowing Evaluation Bedside swallow evaluation   Behaviorial Observations Confused  (Baseline dementia)   Mode of current nutrition Oral diet   Type of oral diet Regular;Nectar - thick liquid   Respiratory Status Room air   Clinical Swallow Evaluation   Oral Musculature generally intact;other (see comments)  (unable to fully assess due to poor comprehension)   Dentition present and adequate   Mucosal Quality good   Mandibular Strength and Mobility intact   Oral Labial Strength and Mobility other (see comments)  (unable to fallow directions)   Lingual Strength and Mobility other (see comments)  (unable to follow directions)   Velar Elevation intact   Buccal Strength and Mobility other (see comments)  (unable to follow directions)   Laryngeal Function Voicing initiated;Dry swallow palpated;Cough;Swallow   Swallow Compensations   Swallow Compensations Alternate viscosity of consistencies;Pacing   Results Oral difficulties only   Swallow Eval: Clinical Impressions   Skilled Criteria for Therapy Intervention Current level of function same as previous level of function   Functional Assessment Scale (FAS) 4   Dysphagia Outcome Severity Scale (WIN) Level 4 - WIN   Treatment Diagnosis oropharyngeal dysphagia   Diet texture recommendations Regular diet;Nectar thick liquids   Recommended Feeding/Eating Techniques check mouth frequently for oral residue/pocketing;maintain upright posture during/after eating for 30 mins;small sips/bites   Anticipated Discharge Disposition home w/ home health   Risks and Benefits of Treatment have been explained. Yes   Patient, family and/or staff in agreement with Plan of Care Yes   Clinical  Impression Comments Patient was seen for a clinical bedside swallow evaluation. Patient is currently on a regular diet with nectar thick liquids, which is her baseline diet. Patient was observed during trials of nectar thick liquid, soft and hard solid consistencies. Pt presents with mild-moderate oropharyngeal dysphagia characterized by reduced bolus control, prolonged mastication, and mild pharyngeal residue. Recommend regular diet with nectar thick liquids. Pills whole and in food carrier. Patient to be upright for all PO intake. Allow time for multiple swallows as needed. Feeding assistance to be provided as needed. Nursing to check oral cavity after meals for residual. Patient at baseline, requires no further inpatient speech therapy intervention.    Total Evaluation Time   Total Evaluation Time (Minutes) 20     Thank you for this referral!    Fara Salas MA, CF-SLP  [facility]  [phone]

## 2019-04-30 NOTE — PLAN OF CARE
Discharge Planner PT   Patient plan for discharge: Per family return to daughters with 24/7 cares  Current status: Patient is a 89 year old female arrived to the ED with weakness, dehydration, confusion. Patient registered observation status due to unresponsive episode. Patient with previous medical history of dementia, chronic UTI, generalized weakness, chronc pain syndrome, depression, insomnia, CKD, DJD, osteoporosis, sicca syndrome, scoliosis and R JUAN CARLOS in 2012. Prior to admission patient living with her daughter Chiquita in a group home environment where she received total cares. The home has ramp entry, patient does not have to access other stairs within the home, walk in shower with grab bars at the shower and toilet, patient sleeps in a mechanical bed and rests in mechanical lift chair. Historical information provided by patient's daughter. At baseline patient completed bed mobility with HOB elevated and assistance of 1-2. Assistance of 1-2 for bed to commode transfer with walker and ambulating less than 10 feet at baseline per daughter. Currently, patient not oriented, unable to follow verbal commands, however physical guidance effective in mobilization. Unable to assess strength, however sufficient for baseline mobility. Supine to sitting EOB with HOB elevated mod assist for LE and upper trunk management in bed. Min assist short sitting balance without hand support. Sit to stand with mod assist of 1 for lift off, standing with significant retropulsion required mod assist x 2 to counter posterior weight shift. Bed to chair transfer on the right with max assist of 1 and daughter hands on, physical guidance of walker and placement of hands on recliner for safe decent. Poor sequencing and impaired safety insight. Unable to follow cues for anterior weight shift or to correct narrow base of support.   Barriers to return to prior living situation: Medical status  Recommendations for discharge: Home with 24/7  assistance, continued services, home health PT   Rationale for recommendations: Daughter reports patient is moving at baseline and her sister as well as staff will be able to assist patient with cares and physical assistance at her current functional capacity. Patient demonstrates significant retropulsion and impaired safety insight which would benefit from HHPT intervention to train balance awareness and caregiver in safe transfers. Anticipate patient to do well with discharge home. Patient at baseline, requires no further inpatient physical therapy intervention.        Entered by: Suzanna Mayorga 04/30/2019 10:11 AM      Thank you for your referral.    Suzanna Mayorga, PT, DPT, ATC    [facility]    O: [phone]  E: [email or web address]

## 2019-04-30 NOTE — PROGRESS NOTES
S-(situation): Patient discharged to group home via van with daughter.    B-(background): Observation goals met yes.    A-(assessment): Patient is taking oral antibiotics.    R-(recommendations): Discharge instructions reviewed with daughter Daphne. Listed belongings gathered and returned to patient.(shoes were found and brought to ER security desk. Daphne will pick them up tomorrow).  Patient Education resolved: Yes  New medications-Pt. Has been educated about reason of use and side effects Yes  Home and hospital acquired medications returned to patient NA  Medication Bin checked and emptied on discharge Yes

## 2019-04-30 NOTE — PROGRESS NOTES
Patient is disorientated x4. Patient received PRN tylenol for a Pain score of 4, patient calmed down after pain medication. Patient tried to chew morining pills, crushed pills for next dose but it took an hour for patient to cooperate to take pills. All pills were taken. Patient is baseline for cognition per daughter. Will cont to monitor.

## 2019-04-30 NOTE — CONSULTS
CARE TRANSITION SOCIAL WORK INITIAL ASSESSMENT:      Met with: Family and Caregiver.    DATA  Principal Problem:    Unresponsive episode  Active Problems:    Dementia without behavioral disturbance, unspecified dementia type    Major depressive disorder, recurrent episode, mild (H)    Essential hypertension with goal blood pressure less than 140/90    Gastroesophageal reflux disease with esophagitis    Pyuria    Oropharyngeal dysphagia       Primary Care Clinic [facility]: [facility]  Primary Care MD Name: Dr. Tai Kim    ASSESSMENT  Cognitive Status: confused.       Resources List: Home Care      Description of Support System: Involved, Supportive   Who is your support system?: Children   Support Assessment: Adequate family and caregiver support   Insurance Concerns: No Insurance issues identified  Living Arrangements: group home    This writer met with pt's daughter, Julissa, introduced self and role. Discussed discharge planning and medicare guidelines in regards to home care and SNF benefits.  Patient lives with Julissa in her Group Home.  Patient's other daughter, Harika, also present.  Julissa states she plans to take patient home at discharge.  Discussed recommendation for home physical therapy.  Julissa in agreement with home for RN, HHA, P/T, O/T, and S/T.  Provided list of home care options.  Julissa chose [facility] [place] Phone: [phone].  Julissa is also interested in a -Hospice consult.  Will order at discharge.     PLAN    [facility] Jul[facility] Group [facility] [phone].    JOHNY Mejia  [facility] [phone]/ Jah [phone]       no

## 2019-04-30 NOTE — PROGRESS NOTES
Discharge Planner SLP   Patient plan for discharge: Per family return to daughters with 24/7 cares  Current status: Patient was seen for a clinical bedside swallow evaluation. Patient is currently on a regular diet with nectar thick liquids, which is her baseline diet. Patient was observed during trials of nectar thick liquid, soft and hard solid consistencies. Pt presents with mild-moderate oropharyngeal dysphagia characterized by reduced bolus control, prolonged mastication, and mild pharyngeal residue.   Barriers to return to prior living situation: Medical status  Recommendations for discharge: Recommend regular diet with nectar thick liquids. Pills whole and in food carrier. Patient to be upright for all PO intake. Allow time for multiple swallows as needed. Feeding assistance to be provided as needed. Nursing to check oral cavity after meals for residual.  Rationale for recommendations: To maximize safety of oral intake and reduce risk of aspiration and pneumonia. Patient at baseline, requires no further inpatient speech therapy intervention.            Entered by: Fara Salas 04/30/2019 11:41 AM

## 2019-04-30 NOTE — PROGRESS NOTES
Patient alert but disoriented x4. Patient rambled and had garbled speech. Incontinent of stool and urine. Patient slept most of the night. BP's elevated however patient refused evening BP medications. VSS. Afebrile. Loose productive cough. Patient did not appear to be in pain and did not report pain. LS diminished.

## 2019-04-30 NOTE — PROGRESS NOTES
04/30/19 0945   Quick Adds   Type of Visit Initial PT Evaluation       Present no   Living Environment   Lives With child(maddy), adult   Living Arrangements group home   Home Accessibility no concerns  (ramp access available, does not have to access stairs within)   Transportation Anticipated   (unknown)   Living Environment Comment Patient living with daughter (Chiquita) who is her primary care giver in a group home with nurses during the day.  Receives assistance for toileting, feeding (at times), commode in room does not walk much with walker, assistance for showers by nursing, total assistance for dressing. Walk in shower   Self-Care   Usual Activity Tolerance fair   Current Activity Tolerance fair   Regular Exercise No   Equipment Currently Used at Home hospital bed;commode;wheelchair, manual;walker, rolling;shower chair;grab bar, toilet  (mechanical chair, yamilex palmer, 4WW with seat)   Activity/Exercise/Self-Care Comment mechanical chair   Functional Level Prior   Ambulation 3-->assistive equipment and person   Transferring 2-->assistive person   Toileting 3-->assistive equipment and person   Bathing 3-->assistive equipment and person   Communication 2-->difficulty understanding and speaking (not related to language barrier)   Swallowing 2-->difficulty swallowing liquids/foods  (thickened liquids at baseline)   Cognition 2 - difficulty with organizing thoughts   Fall history within last six months yes   Number of times patient has fallen within last six months   (daughter unable to state number)   Which of the above functional risks had a recent onset or change? ambulation;transferring   General Information   Onset of Illness/Injury or Date of Surgery - Date 04/29/19   Referring Physician Dr. Du   Patient/Family Goals Statement Per family return to daughters with 24/7 cares   Pertinent History of Current Problem (include personal factors and/or comorbidities that impact the POC)  Patient is a 89 year old female arrived to the ED with weakness, dehydration, confusion. Patient registered observation status due to unresponsive episode. Patient with previous medical history of dementia, chronic UTI, generalized weakness, chronc pain syndrome, depression, insomnia, CKD, DJD, osteoporosis, sicca syndrome, scoliosis and R JUAN CARLOS in 2012.    Precautions/Limitations fall precautions   Weight-Bearing Status - LUE full weight-bearing   Weight-Bearing Status - RUE full weight-bearing   Weight-Bearing Status - LLE full weight-bearing   Weight-Bearing Status - RLE full weight-bearing   General Observations Patient asleep throughout subjective information , this information provided by daughter. Patient awoke to verbal and tactile stimulus.    General Info Comments PT orders: evaluate and treat mobility for discharge recommendations. Activity orders:    Cognitive Status Examination   Orientation person   Level of Consciousness lethargic/somnolent   Follows Commands and Answers Questions able to follow single-step instructions;25% of the time;unable to follow multi-step instructions;speech unintelligible   Personal Safety and Judgment impaired;at risk behaviors demonstrated   Memory impaired   Pain Assessment   Patient Currently in Pain No   Integumentary/Edema   Integumentary/Edema Comments thin frail skin throughout   Posture    Posture Forward head position;Protracted shoulders;Kyphosis   Range of Motion (ROM)   ROM Comment Active motion limited by patient's willingness to move. LUE limited above 90 deg shoulder flexion   Strength   Strength Comments Unable to formally assess   Bed Mobility   Bed Mobility Bed mobility skill: Supine to sit;Bed mobility skill: Rolling/Turning   Bed Mobility Skill: Rolling/Turning   Level of [place]: Rolling/Turning stand-by assist   Physical/Nonphysical Assist: Rolling/Turning verbal cues   Assistive Device: Rolling/Turning bed rails   Bed Mobility Skill: Supine to Sit    Level of [place]: Supine/Sit moderate assist (50% patients effort)   Physical Assist/Nonphysical Assist: Supine/Sit verbal cues;supervision;1 person assist   Assistive Device: Supine/Sit bed rails  (HOB elevated)   Transfer Skills   Transfer Transfer Skill: Sit to Stand;Transfer Skill:  Stand to Sit;Transfer Skill: Bed to Chair/Chair to Bed;Transfer Safety Analysis Bed/Chair;Transfer Safety Analysis Sit/Stand;Transfer Safety Analysis Stand/Sit   Transfer Skill: Bed/Chair   Level of [place]: Bed/Chair maximum assist (25% patients effort)   Physical/Nonphysical Assist: Bed/Chair 1 person assist   Weightbearing Restrictions: Bed/Chair full weight-bearing   Assistive Device: Bed/Chair rolling walker   Transfer Safety Analysis Bed/Chair   Transfer Safety Concerns Noted: Bed/Chair decreased weight-shifting ability;decreased step length;decreased sequencing ability;losing balance backward;decreased balance during turns   Impairments Contributing to Impaired Transfers: Bed/Chair impaired balance;impaired postural control;decreased strength   Transfer Skill:  Sit to Stand   Level of [place]: Sit/Stand minimum assist (75% patients effort)   Physical Assist/Nonphysical Assist: Sit/Stand 1 person assist   Weightbearing Restrictions: Sit/Stand full weight-bearing   Assistive Device for Transfer: Sit/Stand rolling walker   Transfer Safety Analysis Sit to Stand   Transfer Safety Concerns Noted: Sit/Stand decreased weight-shifting ability;losing balance backward;decreased sequencing ability   Impairments Contributing to Impaired Transfers: Sit to Stand impaired postural control;decreased strength;impaired balance   Transfer Skill: Stand to Sit   Level of [place]: Stand/Sit moderate assist (50% patients effort)   Physical Assist/Nonphysical Assist: Stand/Sit 1 person assist;verbal cues   Weight-Bearing Restrictions: Stand/Sit full weight-bearing   Assistive Device: Stand to Sit rolling walker   Transfer Safety  Analysis Stand To Sit   Transfer Safety Concerns Noted: Stand to Sit inability to maintain weight-bearing restrictions w/o assist;losing balance backward;decreased proprioception;decreased sequencing ability   Impairments Contributing to Impaired Transfers: Stand to Sit impaired balance;impaired postural control;decreased strength   Gait   Gait Comments unable to assess   Balance   Balance Comments short sitting mod assist to maintain upright trunk. mod-max asssit in standing to overcome posterior weight shift and avoid loosing balance   Sensory Examination   Sensory Perception Comments unable to assess   Coordination   Coordination Comments unable to follow commands for assessment   Muscle Tone   Muscle Tone Comments low tone throughout   Clinical Impression   Criteria for Skilled Therapeutic Intervention no significant expected improvement in functional status;current level of function same as previous level of function;no problems identified which require skilled intervention   PT Diagnosis muscle weakness, impaired balance   Influenced by the following impairments chronic medical condition   Functional limitations due to impairments impaired functional mobility, decreased safety insight, high risk of falling, decreased activity tolerance   Clinical Presentation Stable/Uncomplicated   Clinical Presentation Rationale Patient at baseline per daughter.    Clinical Decision Making (Complexity) Low complexity   Therapy Frequency`   (One time evaluation and treatment)   Predicted Duration of Therapy Intervention (days/wks) once   Anticipated Equipment Needs at Discharge   (defer to  care)   Anticipated Discharge Disposition Home with Assist;Home with Home Therapy   Risk & Benefits of therapy have been explained Yes   Patient, Family & other staff in agreement with plan of care Yes   Clinical Impression Comments Daughter reports patient is moving at baseline and her sister as well as staff will be able to assist  "patient with cares and physical assistance at her current functional capacity. Patient demonstrates significant retropulsion and impaired safety insight which would benefit from HHPT intervention to train balance awareness and caregiver in safe transfers. Anticipate patient to do well with discharge home. Patient at baseline, requires no further inpatient physical therapy intervention.    [facility] TM \"6 Clicks\"   2016, Trustees of [facility], under license to [email or web address].  All rights reserved.   6 Clicks Short Forms Basic Mobility Inpatient Short Form   [facility]  \"6 Clicks\" V.2 Basic Mobility Inpatient Short Form   1. Turning from your back to your side while in a flat bed without using bedrails? 3 - A Little   2. Moving from lying on your back to sitting on the side of a flat bed without using bedrails? 2 - A Lot   3. Moving to and from a bed to a chair (including a wheelchair)? 2 - A Lot   4. Standing up from a chair using your arms (e.g., wheelchair, or bedside chair)? 3 - A Little   5. To walk in hospital room? 1 - Total   6. Climbing 3-5 steps with a railing? 1 - Total   Basic Mobility Raw Score (Score out of 24.Lower scores equate to lower levels of function) 12   Total Evaluation Time   Total Evaluation Time (Minutes) 25     Thank you for your referral.    Suzanna Mayorga, PT, DPT, ATC    [facility]    O: [phone]  E: [email or web address]      "

## 2019-04-30 NOTE — PROGRESS NOTES
"Pt with increased confusion this evening, refused meds, increased restlessness. Daughter at bedside, reports pt \"does get more confused in the evening into the night at home\". Daughter was able to have pt take Seroquel, but continues to refuse others. Will continue to monitor.  "

## 2019-04-30 NOTE — PLAN OF CARE
S-(situation): OT consult    B-(background): Patient was admitted observation status 4/29/19 with lethargy, confusion, and cough.  Patient possible has a UTI.  She has a history of dementia.  She lives with daughter (daughter owns/runs the group home).  She has 24 hour assistance and max-dependent with BADL.    A-(assessment): Patient intermittently follows simple commands (unsure if this is hearing or cognition related).      R-(recommendations): Defer evaluation at this time as patient is reported at baseline.  Please reconsider OT evaluation of status changes.     WILBUR Yeboah/L  [facility]  [phone]

## 2019-04-30 NOTE — DISCHARGE SUMMARY
[facility]  Hospitalist Discharge Summary       Date of Admission:  4/29/2019  Date of Discharge:  4/30/2019  Discharging Provider: Vinicius Naqvi MD      Discharge Diagnoses   Principal Problem:    Unresponsive episode  Active Problems:    Dementia without behavioral disturbance, unspecified dementia type    Oropharyngeal dysphagia    Major depressive disorder, recurrent episode, mild (H)    Essential hypertension with goal blood pressure less than 140/90    Gastroesophageal reflux disease with esophagitis    Pyuria      Follow-ups Needed After Discharge   Follow-up Appointments     Follow-up and recommended labs and tests       Follow up with primary care provider, Tai Kim, as needed, for   hospital follow- up. Follow up for consultation with hospice team at home   as planned.             Unresulted Labs Ordered in the Past 30 Days of this Admission     Date and Time Order Name Status Description    4/29/2019 1220 Urine Culture Preliminary       These results will be followed up by PCP    Discharge Disposition   Admited to home care:   Agency: [facility]  Discharged to home  Condition at discharge: Stable    Hospital Course   89-year-old woman with advanced dementia presented with decreased responsiveness and oral intake and was hospitalized for observation.  Urinalysis demonstrated pyuria and mild leukocytosis was present raising question for possible UTI.  She was treated with ceftriaxone empirically while awaiting urine culture results which remain pending at discharge.  However, she did not have obvious localized symptoms of UTI or pyelonephritis.  Family expressed preference to avoid antibiotic therapy at the time of discharge, so antibiotics were not prescribed for that reason while awaiting urine culture results.  Patient was treated with IV fluids as well and improved and was able to tolerate some oral intake by discharge although oral intake was poor.  Due to  concerns about swallowing problems, she was evaluated by speech therapy who recommended modifying diet with nectar thickened liquids.  She was also evaluated by PT and OT and appeared to have recovered to her baseline functional status by discharge.  Outpatient speech therapy, PT, and OT were advised.  In further discussions with care transitions, family expressed interest in consultation with hospice after discharge for possible initiation of hospice care due to her advancing and probably end-stage dementia.  After investigation, inadequate oral intake associate with advancing dementia was suspected.    Consultations This Hospital Stay   SPEECH LANGUAGE PATH ADULT IP CONSULT  PHYSICAL THERAPY ADULT IP CONSULT  OCCUPATIONAL THERAPY ADULT IP CONSULT  CARE TRANSITION RN/SW IP CONSULT    Code Status   DNR    Time Spent on this Encounter   I, Vinicius Naqvi, personally saw the patient today and spent less than or equal to 30 minutes discharging this patient.       Vinicius Naqvi MD  [facility]  ______________________________________________________________________    Physical Exam   Vital Signs: Temp: 97.7  F (36.5  C) Temp src: Axillary BP: (!) 165/92(took am bp pill late d/t sleeping, ) Pulse: 78 Heart Rate: 85 Resp: 18 SpO2: 97 % O2 Device: None (Room air)    Weight: 135 lbs 2.27 oz  General Appearance: Presently alert sitting in a chair, no signs of distress  Cardiovascular: Regular rate and rhythm  Other: Responsive to voice, appears alert, speaks responsively although her speech is severely dysarthric very difficult to understand, does extend her hand to shake hands appropriately       Primary Care Physician   Tai Kim    Discharge Orders      Reason for your hospital stay    Hospitalized due to sleepiness and not eating and improved     Follow-up and recommended labs and tests     Follow up with primary care provider, Tai Kim, as needed, for hospital follow-  up. Follow up for consultation with hospice team at home as planned.     Activity    Your activity upon discharge: activity as tolerated     DNR (Do Not Resuscitate)     Diet    Follow this diet upon discharge: Orders Placed This Encounter      Regular Diet Adult Nectar Thickened Liquids (pre-thickened or use instant food thickener); Other - please comment       Significant Results and Procedures   Most Recent 3 CBC's:  Recent Labs   Lab Test 04/30/19  0521 04/29/19  1141 04/19/19  2223   WBC 13.4* 11.2* 7.8   HGB 9.9* 8.7* 7.9*   MCV 81 83 77*   * 435 227     Most Recent 3 BMP's:  Recent Labs   Lab Test 04/30/19  0521 04/29/19  1141 04/19/19  2153    140 139   POTASSIUM 3.9 4.1 4.0   CHLORIDE 112* 107 108   CO2 21 23 23   BUN 13 17 26   CR 0.83 0.91 1.13*   ANIONGAP 9 10 8   DIOGO 8.7 8.8 8.8   GLC 99 84 99     Most Recent 6 Bacteria Isolates From Any Culture (See EPIC Reports for Culture Details):  Recent Labs   Lab Test 04/29/19  1154 04/19/19  2301 04/19/19  2154 12/20/18  1341 10/26/18  1047 01/15/18  2043   CULT Culture in progress >100,000 colonies/mL  Escherichia coli  * No growth No growth >100,000 colonies/mL  Escherichia coli  * >100,000 colonies/mL  Escherichia coli  *  10,000 to 50,000 colonies/mL  Escherichia coli  *     Most Recent Urinalysis:  Recent Labs   Lab Test 04/29/19  1154  08/21/17  1316   COLOR Yellow   < > Yellow   APPEARANCE Slightly Cloudy   < > Clear   URINEGLC Negative   < > Negative   URINEBILI Negative   < > Negative   URINEKETONE 5*   < > Negative   SG 1.023   < > 1.020   UBLD Negative   < > Trace*   URINEPH 5.0   < > 6.0   PROTEIN Negative   < > Negative   UROBILINOGEN  --   --  0.2   NITRITE Negative   < > Positive*   LEUKEST Small*   < > Large*   RBCU 11*   < > O - 2   WBCU 7*   < > 5-10*    < > = values in this interval not displayed.   ,   Results for orders placed or performed during the hospital encounter of 04/29/19   XR Chest Port 1 View    Narrative    CHEST  PORTABLE ONE VIEW   4/29/2019 12:02 PM     HISTORY: Coarse cough and generalized weakness.    COMPARISON: 4/19/2019.      Impression    IMPRESSION: Very mild interstitial prominence bilaterally is  increased. This perhaps represents some minimal interstitial edema. No  new airspace disease. A focal nodule at the left suprahilar region is  unchanged. Consider nonurgent CT for further characterization.    MARE ELLIOTT MD   CT Head w/o Contrast    Narrative    CT OF THE HEAD WITHOUT CONTRAST April 29, 2019 12:54 PM     HISTORY: Altered level of consciousness (LOC), unexplained.    TECHNIQUE: 5 mm thick axial CT images of the head were acquired  without IV contrast material. Radiation dose for this scan was reduced  using automated exposure control, adjustment of the mA and/or kV  according to patient size, or iterative reconstruction technique.    COMPARISON: Head CT 12/20/2018.    FINDINGS: Postoperative changes consisting of a left frontopterional  craniotomy and left temporal lobe surgical resection cavity again  noted. There is mild diffuse cerebral volume loss. There are subtle  patchy areas of decreased density in the cerebral white matter  bilaterally that are consistent with sequela of chronic small vessel  ischemic disease.     The ventricles and basal cisterns are within normal limits in  configuration given the degree of cerebral volume loss. There is no  midline shift. There are no extra-axial fluid collections.     No intracranial hemorrhage, mass or recent infarct.    The visualized paranasal sinuses are well-aerated. There is no  mastoiditis. There are no fractures of the visualized bones.       Impression    IMPRESSION: Postoperative changes to the left temporal skull and left  temporal lobe again noted. Diffuse cerebral volume loss and cerebral  white matter changes consistent with chronic small vessel ischemic  disease. No evidence for acute intracranial pathology.             SHRUTHI CALVILLO MD      *Note: Due to a large number of results and/or encounters for the requested time period, some results have not been displayed. A complete set of results can be found in Results Review.       Discharge Medications   Current Discharge Medication List      CONTINUE these medications which have NOT CHANGED    Details   Acetaminophen (ACTAMIN MAXIMUM STRENGTH OR) Take 1,000 mg by mouth once Every 4-6 hours for pain      diclofenac (VOLTAREN) 50 MG EC tablet TAKE ONE TABLET BY MOUTH TWICE A DAY  Qty: 180 tablet, Refills: 2    Associated Diagnoses: CKD (chronic kidney disease) stage 3, GFR 30-59 ml/min (H); Essential hypertension with goal blood pressure less than 140/90      donepezil (ARICEPT) 10 MG tablet TAKE ONE TABLET BY MOUTH EVERY NIGHT AT BEDTIME  Qty: 90 tablet, Refills: 1    Associated Diagnoses: Dementia without behavioral disturbance, unspecified dementia type      FLUoxetine (PROZAC) 20 MG capsule TAKE ONE CAPSULE BY MOUTH EVERY DAY  Qty: 90 capsule, Refills: 1    Associated Diagnoses: Major depressive disorder, recurrent episode, mild (H)      HYDROcodone-acetaminophen (NORCO) 5-325 MG tablet TAKE 1 TABLET BY ORAL ROUTE EVERY 6 HOURS AS NEEDED FOR PAIN  Qty: 60 tablet, Refills: 0    Comments: Last picked up 3/27/19 #60/15 day supply  Associated Diagnoses: Polymyalgia rheumatica (H)      levothyroxine (SYNTHROID/LEVOTHROID) 100 MCG tablet TAKE ONE TABLET BY MOUTH EVERY DAY  Qty: 90 tablet, Refills: 2    Associated Diagnoses: Hypothyroidism, unspecified type      metoprolol tartrate (LOPRESSOR) 25 MG tablet TAKE ONE TABLET BY MOUTH EVERY MORNING AND TWO TABLETS EVERY EVENING  Qty: 270 tablet, Refills: 3    Associated Diagnoses: Hypertension goal BP (blood pressure) < 140/90      MULTI-VITAMIN TABS   OR 1 tablet daily  Qty: 120, Refills: 0      polyethylene glycol (MIRALAX/GLYCOLAX) packet Take 1 packet by mouth daily      QUEtiapine (SEROQUEL) 25 MG tablet TAKE ONE TABLET BY MOUTH AT BEDTIME AND TAKE 1 AND  1/2 TABLETS DAILY AS NEEDED FOR RESTLESSNESS/AGITATION  Qty: 225 tablet, Refills: 1    Associated Diagnoses: Dementia without behavioral disturbance, unspecified dementia type      ranitidine (ZANTAC) 300 MG tablet TAKE ONE TABLET BY MOUTH EVERY DAY  Qty: 90 tablet, Refills: 2    Associated Diagnoses: Gastroesophageal reflux disease with esophagitis      docusate sodium (COLACE) 100 MG capsule Take 100 mg by mouth At Bedtime       order for DME Equipment being ordered: Hospital Bed  Qty: 1 each, Refills: 0    Associated Diagnoses: Generalized muscle weakness; Chronic pain syndrome         STOP taking these medications       cefdinir (OMNICEF) 300 MG capsule Comments:   Reason for Stopping:             Allergies   Allergies   Allergen Reactions     Levofloxacin Other (See Comments)     Agitation and combativeness     Bactrim [Sulfamethoxazole W/Trimethoprim]      Ceftin [Cefuroxime] Other (See Comments)     Lethargy     Codeine Itching     endocet/percocet caused     Cortisone Acetate      20 years ago doesn't remember exact side effect. Has tolerated steroid injection into joints. 6/2008 noted     Oxycodone Itching

## 2019-04-30 NOTE — PROGRESS NOTES
Name: Anaid Dockery    MRN#: [id number]    Reason for Hospitalization: Lethargy [R53.83]  Urinary tract infection without hematuria, site unspecified [N39.0]    Discharge Date: 4/30/2019    Patient / Family response to discharge plan: in agreement    Other Providers (Care Coordinator, County Services, PCA services etc): No    CTS Hand Off Completed: Yes    PAS #: N/A    SARI Score: N/A - OBS    Future Appointments: No future appointments.    Discharge Disposition: home - [employer] [facility] Phone: [phone] - RN, HHA, P/T, O/T, S/T    Discharge Planner   Discharge Plans in progress: -Home Care  Barriers to discharge plan: None  Follow up plan: Home Care and PCP    JOHNY Mejia  [facility] [phone]/ Jah [phone]         Entered by: Danitza Benton 04/30/2019 1:48 PM

## 2019-05-01 ENCOUNTER — MEDICAL CORRESPONDENCE (OUTPATIENT)
Dept: HEALTH INFORMATION MANAGEMENT | Facility: CLINIC | Age: 84
End: 2019-05-01

## 2019-05-01 ENCOUNTER — PATIENT OUTREACH (OUTPATIENT)
Dept: CARE COORDINATION | Facility: CLINIC | Age: 84
End: 2019-05-01

## 2019-05-01 ENCOUNTER — TELEPHONE (OUTPATIENT)
Dept: FAMILY MEDICINE | Facility: CLINIC | Age: 84
End: 2019-05-01

## 2019-05-01 DIAGNOSIS — M35.3 POLYMYALGIA RHEUMATICA (H): ICD-10-CM

## 2019-05-01 ASSESSMENT — ACTIVITIES OF DAILY LIVING (ADL)
DEPENDENT_IADLS:: CLEANING;COOKING;LAUNDRY;SHOPPING;MEAL PREPARATION;MEDICATION MANAGEMENT;MONEY MANAGEMENT;TRANSPORTATION

## 2019-05-01 NOTE — LETTER
[place] CARE COORDINATION  [address]   [place] [address]    May 1, 2019    Anaid Dockery  [address] [facility] [address]      Dear Anaid,    I am a clinic care coordinator who works with Taiderian Kim MD at [facility]. I recently tried to call and was unable to reach you. I wanted to introduce myself and provide you with my contact information so that you can call me with questions or concerns about your health care. Below is a description of clinic care coordination and how I can further assist you.     The clinic care coordinator is a registered nurse and/or  who understand the health care system. The goal of clinic care coordination is to help you manage your health and improve access to the [facility] system in the most efficient manner. The registered nurse can assist you in meeting your health care goals by providing education, coordinating services, and strengthening the communication among your providers. The  can assist you with financial, behavioral, psychosocial, chemical dependency, counseling, and/or psychiatric resources.    Please feel free to contact me at [phone], with any questions or concerns. We at [facility] are focused on providing you with the highest-quality healthcare experience possible and that all starts with you.     Sincerely,     Lloyd Chung MSN, RN, PHN, CCM   Primary Care Clinical RN Care Coordinator  [facility]   [email or web address]  Office: [phone]    Enclosed: I have enclosed a copy of a 24 Hour Access Plan. This has helpful phone numbers for you to call when needed. Please keep this in an easy to access place to use as needed.

## 2019-05-01 NOTE — TELEPHONE ENCOUNTER
Patient called to schedule an appointment for a hospital follow-up or appeared on a report showing that they were recently discharged from the hospital.    Patient was admitted to :  [facility]  Discharged date: 4/30/19  Reason for hospital admission:  Dementia Without Behavioral Disturbance, Unspecified Dementia Type, Lethargy  Does patient have future appointm scheduled with provider? No  Date of future appointment:        This information will be used to help the care team plan for the patients upcoming visit.  The triage RN may determine that a follow up call is necessary and reach out to the patient via the phone number listed in the chart.     Please route this message on routine priority to the Triage RN pool.

## 2019-05-01 NOTE — PROGRESS NOTES
Clinic Care Coordination Contact  [facility]/Voicemail    Referral Source: IP Handoff  Clinical Data: Care Coordinator Outreach  Outreach attempted x 1.  Left message on voicemail with call back information and requested return call.  Plan: Care Coordinator will mail out care coordination introduction letter with care coordinator contact information and explanation of care coordination services. Care Coordinator will try to reach patient again in 1-2 business days.        Lloyd Chung MSN, RN, PHN, [facility]   Primary Care Clinical RN Care Coordinator  [facility]   [email or web address]  Office: [phone]

## 2019-05-01 NOTE — LETTER
Health Care Home - Access Care Plan    About Me:    Patient Name:  Anaid Dockery    YOB: 1930  Age:                      89 year old   [facility] MRN:     [id number] Telephone Information:   Home Phone [phone]   Mobile [phone]       Address:  [address] [place] [address]  [place] [address] Email address:  [email or web address]      Emergency Contact(s)   Name Relationship Lgl Grd Work Phone Home Phone Mobile Phone   1. CORNELIO NESS* Daughter   [phone]   2. SOCORRO PRATHER Daughter   [phone]    3. ESTELA MCFADDEN Daughter    [phone]             Health Maintenance:      My Access Plan  Medical Emergency 911   Questions or concerns during clinic hours Primary Clinic Line, I will call the clinic directly: [facility] - [phone]   24 Hour Appointment Line [phone] or  [phone]) (toll free)   24 Hour Nurse Line [phone] (toll free)   Questions or concerns outside clinic hours 24 Hour Appointment Line, I will call the after-hours on-call line:   [facility] [phone] or [phone]) (toll-free)   Preferred Urgent Care [facility] [phone]   Preferred Hospital [facility]  [phone]   Preferred Pharmacy [facility] [place] [address]     Behavioral Health Crisis Line The National Suicide Prevention Lifeline at 1-832.579.6055 or 911                     My Care Team Members  Patient Care Team       Relationship Specialty Notifications Start End    Tai Kim MD PCP - General   3/23/01     Phone: [phone] Fax: [phone]         [address] [place] [address]    Tai Kim MD Assigned PCP   2/19/17     Phone: [phone] Fax: [phone]         [address] [place] [address]    Care, [facility]  [facility] ([facility]), ([place])  7/5/18     Phone:  [phone]         Lloyd Gudino, RN Clinic Care Coordinator Primary Care - CC Admissions 5/1/19     Phone: [phone] Fax: [phone]               My Medical and Care Information  Problem List   Patient Active Problem List   Diagnosis     Asymptomatic varicose veins     Slow transit constipation     Osteoporosis     Flatulence, eructation, and gas pain     Hard of hearing     DJD (degenerative joint disease), lumbar     Balance problem     Hip pain     CKD (chronic kidney disease) stage 3, GFR 30-59 ml/min (H)     Advance Care Planning     S/P hip replacement     Chronic foot pain/bilateral     Persistent insomnia     Dementia without behavioral disturbance, unspecified dementia type     Major depressive disorder, recurrent episode, mild (H)     Risk for falls     Hypothyroidism, unspecified type     Hx of bladder infections     Chronic right shoulder pain     Chronic left shoulder pain     Essential hypertension with goal blood pressure less than 140/90     Gastroesophageal reflux disease with esophagitis     Hip pain, right     Chronic pain syndrome     Generalized muscle weakness     Hypopotassemia     Anemia     Pyuria     Unresponsive episode     Syncope     Oropharyngeal dysphagia      Current Medications and Allergies:  See printed Medication Report

## 2019-05-03 RX ORDER — HYDROCODONE BITARTRATE AND ACETAMINOPHEN 5; 325 MG/1; MG/1
TABLET ORAL
Qty: 60 TABLET | Refills: 0 | Status: SHIPPED | OUTPATIENT
Start: 2019-05-03

## 2019-05-03 NOTE — TELEPHONE ENCOUNTER
Routing refill request to provider for review/approval because:  Drug not on the [facility] refill protocol     Norco   Last Written Prescription Date:  4/8/2019  Last Fill Quantity: 60,  # refills: 0   Last office visit: 11/2/2018 with prescribing provider:  Judy   Future Office Visit:      Requested Prescriptions   Pending Prescriptions Disp Refills     HYDROcodone-acetaminophen (NORCO) 5-325 MG tablet 60 tablet 0     Sig: TAKE 1 TABLET BY ORAL ROUTE EVERY 6 HOURS AS NEEDED FOR PAIN       There is no refill protocol information for this order          Fara Dunacn RN on 5/3/2019 at 4:05 PM     no

## 2019-05-04 LAB
BACTERIA SPEC CULT: ABNORMAL
BACTERIA SPEC CULT: ABNORMAL
Lab: ABNORMAL
SPECIMEN SOURCE: ABNORMAL

## 2019-05-09 ENCOUNTER — DOCUMENTATION ONLY (OUTPATIENT)
Dept: CARE COORDINATION | Facility: CLINIC | Age: 84
End: 2019-05-09

## 2019-05-09 NOTE — PROGRESS NOTES
[facility] utilizes an encounter to take the place of a direct phone call to your office. Please take a moment to review the below request. Please reply or route message to author of this encounter.  Message will act as a verbal OK of orders requested below. Thank you.    Family requesting hospice assessment and admission with [facility], if appropriate. If you agree pt has a terminal diagnosis with 6 month or less prognosis hospice is requesting orders as below.     OK to admit to hospice. OK for hospice orders. OK for current meds and treatments.  MSW eval and treat and admit to hospice by hospice consult    If admitted admission nurse will contact you for on going hospice orders.     Any questions or concerns please contact me. Thank you for your care of this patient,    Daja Monroe RN PHN PN   [facility]  Hospice Day Triage RN/Referral Navigator  [phone]  [email or web address]

## 2019-05-13 ENCOUNTER — DOCUMENTATION ONLY (OUTPATIENT)
Dept: FAMILY MEDICINE | Facility: CLINIC | Age: 84
End: 2019-05-13

## 2019-05-13 NOTE — PROGRESS NOTES
[facility] now requests orders and shares plan of care/discharge summaries for some patients through [employer].  Please REPLY TO THIS MESSAGE OR ROUTE BACK TO THE AUTHOR in order to give authorization for orders when needed.  This is considered a verbal order, you will still receive a faxed copy of orders for signature.  Thank you for your assistance in improving collaboration for our patients.    ORDER  Patient admitted to [facility] services effective 5/13/19 for DX Alzheimers dementia.  If questions or concerns please contact RN admission clinician listed below.  Thanks    Fara Hill RN, BSN, PHN   Hospice Admission Clinician/PACCT HOSPICE RN [facility]  [address] [place] [address]  [email or web address]  [email or web address]   Office: [phone]   Cell: [phone]

## 2019-05-15 ENCOUNTER — PATIENT OUTREACH (OUTPATIENT)
Dept: CARE COORDINATION | Facility: CLINIC | Age: 84
End: 2019-05-15

## 2019-05-15 NOTE — PROGRESS NOTES
Clinic Care Coordination Contact    Situation: Patient chart reviewed by care coordinator.    Background: The patient has Alzheimer's dementia.    Assessment: The patient has been entered into hospice.  Care coordination does not follow patients in hospice.  Therefore the case will be closed at this time.    Plan/Recommendations: Care Coordination to remain available for the patient to contact in the event of future needs. No follow up planned at this time.       Lloyd Chung MSN, RN, PHN, [facility]   Primary Care Clinical RN Care Coordinator  [facility]   [email or web address]  Office: [phone]

## 2019-05-23 ENCOUNTER — TELEPHONE (OUTPATIENT)
Dept: FAMILY MEDICINE | Facility: CLINIC | Age: 84
End: 2019-05-23

## 2019-05-23 NOTE — TELEPHONE ENCOUNTER
Central Prior Authorization Team   Phone: [phone]    PA Initiation    Medication: diclofenac (VOLTAREN) 50 MG EC tablet-Approved-  Insurance Company: [employer] - Phone [phone] Fax [phone]  Pharmacy Filling the Rx: [facility] [address]   Filling Pharmacy Phone: [phone]  Filling Pharmacy Fax:    Start Date: 5/21/2019    Prior Authorization Approval    Authorization Effective Date: 5/21/2019  Authorization Expiration Date: 7/20/2019  Medication: diclofenac (VOLTAREN) 50 MG EC tablet-Approved-  Approved Dose/Quantity:   Reference #:     Insurance Company: [employer] - Phone [phone] Fax [phone]  Expected CoPay:       CoPay Card Available:      Foundation Assistance Needed:    Which Pharmacy is filling the prescription (Not needed for infusion/clinic administered): [facility] [address]   Pharmacy Notified: Yes  Patient Notified: Yes

## 2019-05-29 ENCOUNTER — DOCUMENTATION ONLY (OUTPATIENT)
Dept: OTHER | Facility: CLINIC | Age: 84
End: 2019-05-29

## 2019-05-31 ENCOUNTER — TELEPHONE (OUTPATIENT)
Dept: FAMILY MEDICINE | Facility: CLINIC | Age: 84
End: 2019-05-31

## 2019-05-31 DIAGNOSIS — N39.0 URINARY TRACT INFECTION WITHOUT HEMATURIA, SITE UNSPECIFIED: Primary | ICD-10-CM

## 2019-05-31 NOTE — TELEPHONE ENCOUNTER
Reason for Call:  Form, our goal is to have forms completed with 72 hours, however, some forms may require a visit or additional information.    Type of letter, form or note:  Home Health Certification    Who is the form from?: Home care    Where did the form come from: form was faxed in    What clinic location was the form placed at?: [facility]    Where the form was placed: Given to MA/RN    What number is listed as a contact on the form?:        Additional comments:     Call taken on 5/31/2019 at 1:20 PM by Haley Combs

## 2019-06-03 RX ORDER — HALOPERIDOL 2 MG/ML
.3-1 SOLUTION ORAL EVERY 6 HOURS PRN
COMMUNITY

## 2019-06-03 RX ORDER — LORAZEPAM 0.5 MG/1
.25-.5 TABLET ORAL EVERY 4 HOURS PRN
COMMUNITY

## 2019-06-03 RX ORDER — ACETAMINOPHEN 650 MG/1
650 SUPPOSITORY RECTAL EVERY 4 HOURS PRN
COMMUNITY

## 2019-06-03 RX ORDER — TRIMETHOPRIM 100 MG/1
100 TABLET ORAL
Qty: 0.5 TABLET | Refills: 0 | COMMUNITY
Start: 2019-06-03

## 2019-06-03 RX ORDER — BISACODYL 10 MG
10 SUPPOSITORY, RECTAL RECTAL 2 TIMES DAILY PRN
COMMUNITY

## 2019-06-03 RX ORDER — ATROPINE SULFATE 10 MG/ML
2-4 SOLUTION/ DROPS OPHTHALMIC
COMMUNITY

## 2019-06-03 RX ORDER — MORPHINE SULFATE 20 MG/ML
2.5-5 SOLUTION ORAL
COMMUNITY

## 2019-06-03 NOTE — TELEPHONE ENCOUNTER
Medication reconciliation completed by RN. Form and chart forwarded to PCP for signatures.    Katie Angeles RN

## 2019-06-05 ENCOUNTER — MEDICAL CORRESPONDENCE (OUTPATIENT)
Dept: HEALTH INFORMATION MANAGEMENT | Facility: CLINIC | Age: 84
End: 2019-06-05

## 2019-06-14 ENCOUNTER — DOCUMENTATION ONLY (OUTPATIENT)
Facility: CLINIC | Age: 84
End: 2019-06-14

## 2019-06-19 NOTE — PROGRESS NOTES
,Hospice physician visit  Location: group home  Reason for visit: Evaluate symptoms  HPI: Patient is an 89-year-old woman with Alzheimer's disease with behaviors, syncope, failure to thrive, debility, depression, constipation, chronic pain, weight loss and recurrent UTI.  She was admitted to hospice mid May after 10 months of functional and nutritional decline.  11 months prior to entering hospice she was able to ambulate short distances with minimal assistance, hold a conversation and answer questions appropriately, aware of staff and family in the home and able to verbalize her name without cues.  At the time of hospice entry she was total assist with ADLs, to assist for bed bath due to, combative/uncooperative behaviors, only pivot transferring with an assist of 1, no longer able to ambulate.  She has confused speech, just several words, often does not recognize caregivers or family, requiring him feeding with a 23 pound weight loss over the last year, decrease in arm circumference of 3.5 cm over 11 months.  I was asked to see the patient due to increasing agitation, worse with bathing, also with other care such as changing her close, moving her from bed to chair.  Her daughter is a psychiatric nurse and has been concerned about overmedicating her mother.  Initially the patient was thought to have pain, history of chronic pain in the left shoulder status post surgery years ago.  She was placed on morphine 2.5 mg p.o. twice a day scheduled, this was increased to 4 times daily scheduled with good effect, but the patient developed  Myoclonus and urinary retention with morphine,  and she was changed to Dilaudid, taking 3-4 times daily as needed with good effect. Pt possibly still having pain-- grimaces with light touch today.  Patient was noted to have increased agitation around 4 PM each day, managed with redirection and quetiapine at bedtime.  Over several weeks, quetiapine was increased to 25 mg twice daily and a  dose at bedtime with an additional 25 mg dose 1/2-hour prior to bathing.  Mirtazapine was added at bedtime given patient's history of depression and poor sleep.  No evidence of a UTI or constipation.  On the morning of my visit the patient was picking, hallucinating, calling out during the visit.  Caregiver noted she had been agitated since waking this morning.  They have been managing agitation with redirection and calm.  Intake is very poor for solids, 3 to 4 cups of liquids daily.    PMH: Hypothyroidism, GERD, hypertension, lumbar degenerative joint disease, CKD stage V, chronic pain syndrome.  PSH: Ventral hernia repair, rectocele repair, total abdominal hysterectomy, right total hip arthroplasty, cystocele repair, bunion surgery, varicose vein stripping, cataract removal.  Family history: Sister with an unknown cancer.  Social history: Daughter is very involved in care, lives in a group home.  Daughter manages medications.  12 point review systems attempted but patient is nonverbal.  Objective:  Awake, sitting up in the chair, calling out nonsensically to us and caregiver.  Grimaces with light touch of the arm, increased agitation with interaction with the patient, patient not further examined given her discomfort.  Assessment: Patient is a 89-year-old woman with end-stage Alzheimer's dementia and significant agitation.  Given her long-standing chronic pain disorder, pain could be contributing to symptoms.  Her prognosis is poor, estimated less than 6 months due to significant nutritional, functional and cognitive decline.  Plan: We discussed with patient's daughter, Julissa, on the phone.  Initial plan to adjust Seroquel to 12.5 mg after breakfast, 25 mg at 3 PM and 50 mg at at bedtime.  Continue mirtazapine, continue 25 mg as needed prior to bathing  Suspect there may be an element of pain that is driving behaviors.  Recommend next week to consider methadone 2.5 mg at at bedtime and continue PRN Dilaudid.   Daughter is uncertain that she wants to start methadone but will consider.  Total visit time > 45 minutes, more than half spent coordinating care with group home care givers and daughter, doing joint decision making on the next steps for the patient.   Sherita Dasilva MD

## 2019-07-08 ENCOUNTER — TELEPHONE (OUTPATIENT)
Dept: FAMILY MEDICINE | Facility: CLINIC | Age: 84
End: 2019-07-08

## 2019-07-08 NOTE — PROGRESS NOTES
Death certificate completed indicating dementia is because of death.   This resulted in basically total body failure

## 2019-09-28 ENCOUNTER — HEALTH MAINTENANCE LETTER (OUTPATIENT)
Age: 84
End: 2019-09-28

## 2020-03-15 ENCOUNTER — HEALTH MAINTENANCE LETTER (OUTPATIENT)
Age: 85
End: 2020-03-15

## 2021-01-10 ENCOUNTER — HEALTH MAINTENANCE LETTER (OUTPATIENT)
Age: 86
End: 2021-01-10

## 2021-04-29 NOTE — TELEPHONE ENCOUNTER
Script brought to [facility] pharmacy. Maru Bradley LPN      
norco      Last Written Prescription Date: 10/16/2017  Last Fill Quantity: 60,  # refills: 0   Last Office Visit with [facility], [facility] or  Health prescribing provider: 08/21/2017      Thank You,  Godwin Butcher, Pharmacy [facility]                                                 
[FreeTextEntry1] : I told him to use up for the 3-week supply of Coumadin that he has and then discontinue.  Amiodarone and metoprolol were anticipated to be used for 2 months after surgery.  I told him to discontinue amiodarone when he is finished.  I would continue with metoprolol for an additional month and if he remains free of recurrence of any atrial fibrillation then every consider stopping it.  He should continue with baby aspirin.\par \par I told him to keep a close watch on the median sternotomy incision.  If there is any early evidence of scar hypertrophy etc. then he will need to see a dermatologist.

## 2021-05-08 ENCOUNTER — HEALTH MAINTENANCE LETTER (OUTPATIENT)
Age: 86
End: 2021-05-08

## 2021-10-23 ENCOUNTER — HEALTH MAINTENANCE LETTER (OUTPATIENT)
Age: 86
End: 2021-10-23

## 2022-06-04 ENCOUNTER — HEALTH MAINTENANCE LETTER (OUTPATIENT)
Age: 87
End: 2022-06-04

## 2022-10-09 ENCOUNTER — HEALTH MAINTENANCE LETTER (OUTPATIENT)
Age: 87
End: 2022-10-09

## 2023-01-15 NOTE — DOWNTIME EVENT NOTE
The EMR was down for 4 hours on 2/12/2017.    Renae Espinoza RN was responsible for completing the paper charting during this time period.     The following information was re-entered into the system by Anirudh Moore: MAR    The following information will remain in the paper chart: Copy of  Downtime JORDY Moore  2/12/2017   165.1

## 2023-06-10 ENCOUNTER — HEALTH MAINTENANCE LETTER (OUTPATIENT)
Age: 88
End: 2023-06-10

## 2023-10-26 NOTE — PHARMACY-CONSULT NOTE
Anaid Dockery is a 87 year old female, Vancomycin dose: 1,500mg IV Q24H  Indication: Sepsis  Day of Therapy: 1   Renal Function: Estimated at 55mls/min  Goal Trough Level: 15-20 mg/L    Plan: Continue Current Dose  Will continue to follow daily and check levels as appropriate in 1-3 Days.    Guillaume Blake, H     understanding of HEP/Ed previously given. Time In:10:15 am            Time Out: 11:15 am    CODE  Minutes  Units  Total Provided Total Approved   87194' Fluidotherapy 10 1  7 56   33151' Paraffin     64   11410' Ultrasound     64   96997' Therapeutic Ex 28 2  7 112   51341' Therapeutic Activity 15 1  6 112   98931' Manual Therapy    8 56    Other                           TOTAL TREATMENT TIME 60 4        Plan: OT 1-2/week for 12 sessions    [x]  Continues Plan of care with focus on ROM, Strengthening, Pt Ed, HEP, Scar management, : Treatment covered based on POC and graduated to patient's progress. Pt education continues at each visit to obtain maximum benefits from skilled OT intervention.     []  Alter Plan of care:    []  Discharge:    HAN Lee/COURTNEY [id number]